# Patient Record
Sex: FEMALE | Race: WHITE | NOT HISPANIC OR LATINO | Employment: OTHER | URBAN - NONMETROPOLITAN AREA
[De-identification: names, ages, dates, MRNs, and addresses within clinical notes are randomized per-mention and may not be internally consistent; named-entity substitution may affect disease eponyms.]

---

## 2019-10-23 ENCOUNTER — ALLIED HEALTH/NURSE VISIT (OUTPATIENT)
Dept: FAMILY MEDICINE | Facility: OTHER | Age: 29
End: 2019-10-23
Payer: COMMERCIAL

## 2019-10-23 VITALS — SYSTOLIC BLOOD PRESSURE: 110 MMHG | DIASTOLIC BLOOD PRESSURE: 68 MMHG | HEART RATE: 76 BPM | WEIGHT: 140.2 LBS

## 2019-10-23 DIAGNOSIS — N91.2 ABSENCE OF MENSTRUATION: Primary | ICD-10-CM

## 2019-10-23 LAB — HCG UR QL: POSITIVE

## 2019-10-23 PROCEDURE — 81025 URINE PREGNANCY TEST: CPT | Performed by: FAMILY MEDICINE

## 2019-10-23 PROCEDURE — 99207 ZZC NO CHARGE NURSE ONLY: CPT

## 2019-10-23 NOTE — PROGRESS NOTES
Brittainy N Brachtl is a 29 year old here today for a pregnancy test.  LMP: Patient's last menstrual period was 09/10/2019 (exact date).  Wt: 140 lbs 3.2 oz.    Symptoms include weight gain, absence of menses, nausea &/or vomiting and fatigue.    Omar informed of positive pregnancy test results. JUANA: 2020    Educational advice given: nutrition, smoking and drugs & alcohol.    Current medications reviewed: Yes    Previous pregnancy history remarkable for: diabetes (first 2 pregnancies had gestational diabetes. Last pregnancy was normal). Has 3 children (11 year old, 7 year old, 2 year old)    Plan: schedule appointment with OB Educator and/or OB class, follow-up appointment with Dr. Casillas (wants female provider) for pre-larry care, take multivitamin or pre-larry vitamins and OB Education packet given.    She is to call back if she has any questions or concerns.  She is advised to notify a provider immediately if she experiences any severe cramping or abdominal pain or any vaginal bleeding.    Lena Crump, DASHAN, RN  [facility]

## 2019-11-28 ENCOUNTER — APPOINTMENT (OUTPATIENT)
Dept: ULTRASOUND IMAGING | Facility: CLINIC | Age: 29
End: 2019-11-28
Attending: NURSE PRACTITIONER
Payer: COMMERCIAL

## 2019-11-28 ENCOUNTER — HOSPITAL ENCOUNTER (EMERGENCY)
Facility: CLINIC | Age: 29
Discharge: HOME | End: 2019-11-28
Attending: NURSE PRACTITIONER | Admitting: NURSE PRACTITIONER
Payer: COMMERCIAL

## 2019-11-28 VITALS
SYSTOLIC BLOOD PRESSURE: 137 MMHG | DIASTOLIC BLOOD PRESSURE: 86 MMHG | OXYGEN SATURATION: 100 % | RESPIRATION RATE: 18 BRPM | TEMPERATURE: 97.8 F

## 2019-11-28 DIAGNOSIS — O20.9 BLEEDING IN EARLY PREGNANCY: ICD-10-CM

## 2019-11-28 LAB
B-HCG SERPL-ACNC: ABNORMAL IU/L (ref 0–5)
BLOOD BANK CMNT PATIENT-IMP: NORMAL

## 2019-11-28 PROCEDURE — 99284 EMERGENCY DEPT VISIT MOD MDM: CPT | Performed by: NURSE PRACTITIONER

## 2019-11-28 PROCEDURE — 25000128 H RX IP 250 OP 636: Performed by: NURSE PRACTITIONER

## 2019-11-28 PROCEDURE — 85461 HEMOGLOBIN FETAL: CPT | Performed by: NURSE PRACTITIONER

## 2019-11-28 PROCEDURE — 96372 THER/PROPH/DIAG INJ SC/IM: CPT | Performed by: NURSE PRACTITIONER

## 2019-11-28 PROCEDURE — 84702 CHORIONIC GONADOTROPIN TEST: CPT | Performed by: NURSE PRACTITIONER

## 2019-11-28 PROCEDURE — 25000132 ZZH RX MED GY IP 250 OP 250 PS 637: Performed by: NURSE PRACTITIONER

## 2019-11-28 PROCEDURE — 76801 OB US < 14 WKS SINGLE FETUS: CPT

## 2019-11-28 PROCEDURE — 86850 RBC ANTIBODY SCREEN: CPT | Performed by: NURSE PRACTITIONER

## 2019-11-28 PROCEDURE — 86901 BLOOD TYPING SEROLOGIC RH(D): CPT | Performed by: NURSE PRACTITIONER

## 2019-11-28 PROCEDURE — 86900 BLOOD TYPING SEROLOGIC ABO: CPT | Performed by: NURSE PRACTITIONER

## 2019-11-28 PROCEDURE — 99284 EMERGENCY DEPT VISIT MOD MDM: CPT | Mod: 25 | Performed by: NURSE PRACTITIONER

## 2019-11-28 RX ORDER — ACETAMINOPHEN 325 MG/1
650 TABLET ORAL ONCE
Status: COMPLETED | OUTPATIENT
Start: 2019-11-28 | End: 2019-11-28

## 2019-11-28 RX ADMIN — HUMAN RHO(D) IMMUNE GLOBULIN 300 MCG: 300 INJECTION, SOLUTION INTRAMUSCULAR at 20:07

## 2019-11-28 RX ADMIN — ACETAMINOPHEN 650 MG: 325 TABLET, FILM COATED ORAL at 20:06

## 2019-11-28 NOTE — ED AVS SNAPSHOT
[facility] Emergency Department  [address] [place] [address]  Phone:  [phone]  Fax:  [phone]                                    Brittainy N Brachtl   MRN: [id number]    Department:  [facility] Emergency Department   Date of Visit:  11/28/2019           After Visit Summary Signature Page    I have received my discharge instructions, and my questions have been answered. I have discussed any challenges I see with this plan with the nurse or doctor.    ..........................................................................................................................................  Patient/Patient Representative Signature      ..........................................................................................................................................  Patient Representative Print Name and Relationship to Patient    ..................................................               ................................................  Date                                   Time    ..........................................................................................................................................  Reviewed by Signature/Title    ...................................................              ..............................................  Date                                               Time          22EPIC Rev 08/18

## 2019-11-29 LAB
ABO + RH BLD: NORMAL
ABO + RH BLD: NORMAL
BLD GP AB SCN SERPL QL: NORMAL
BLOOD BANK CMNT PATIENT-IMP: NORMAL
DATE RH IMM GL GVN: NORMAL
FETAL CELL SCN BLD QL ROSETTE: NORMAL
RH IG VIALS RECOM PATIENT: NORMAL

## 2019-11-29 NOTE — DISCHARGE INSTRUCTIONS
Glad Baby Looks Good.  Please follow up with your primary OB/GYN in the next week.  Return for any new or worsening symptoms  Happy Thanksgiving

## 2019-11-29 NOTE — ED NOTES
PT is PG 12 wks- w/o any previous complications.  Today she has noted dark brown spotting. No cramping.  Pt has never had any problems like this b/4, so to ED for eval.  Pt's next OB appt is Thursday this wk.

## 2019-12-09 ENCOUNTER — PRENATAL OFFICE VISIT (OUTPATIENT)
Dept: OBGYN | Facility: CLINIC | Age: 29
End: 2019-12-09
Payer: COMMERCIAL

## 2019-12-09 VITALS
HEIGHT: 65 IN | SYSTOLIC BLOOD PRESSURE: 122 MMHG | BODY MASS INDEX: 24.49 KG/M2 | DIASTOLIC BLOOD PRESSURE: 74 MMHG | HEART RATE: 72 BPM | WEIGHT: 147 LBS

## 2019-12-09 DIAGNOSIS — Z11.3 SCREEN FOR STD (SEXUALLY TRANSMITTED DISEASE): ICD-10-CM

## 2019-12-09 DIAGNOSIS — Z12.4 SCREENING FOR MALIGNANT NEOPLASM OF CERVIX: ICD-10-CM

## 2019-12-09 DIAGNOSIS — Z34.92 NORMAL PREGNANCY IN SECOND TRIMESTER: Primary | ICD-10-CM

## 2019-12-09 LAB
ALBUMIN UR-MCNC: NEGATIVE MG/DL
APPEARANCE UR: CLEAR
BASOPHILS # BLD AUTO: 0 10E9/L (ref 0–0.2)
BASOPHILS NFR BLD AUTO: 0.4 %
BILIRUB UR QL STRIP: NEGATIVE
COLOR UR AUTO: NORMAL
DIFFERENTIAL METHOD BLD: NORMAL
EOSINOPHIL NFR BLD AUTO: 2.2 %
ERYTHROCYTE [DISTWIDTH] IN BLOOD BY AUTOMATED COUNT: 13.2 % (ref 10–15)
GLUCOSE UR STRIP-MCNC: NEGATIVE MG/DL
HBA1C MFR BLD: 4.7 % (ref 0–5.6)
HCT VFR BLD AUTO: 40.1 % (ref 35–47)
HGB BLD-MCNC: 13.5 G/DL (ref 11.7–15.7)
HGB UR QL STRIP: NEGATIVE
IMM GRANULOCYTES # BLD: 0 10E9/L (ref 0–0.4)
IMM GRANULOCYTES NFR BLD: 0.5 %
KETONES UR STRIP-MCNC: NEGATIVE MG/DL
LEUKOCYTE ESTERASE UR QL STRIP: NEGATIVE
LYMPHOCYTES # BLD AUTO: 2.5 10E9/L (ref 0.8–5.3)
LYMPHOCYTES NFR BLD AUTO: 30 %
MCH RBC QN AUTO: 30.3 PG (ref 26.5–33)
MCHC RBC AUTO-ENTMCNC: 33.7 G/DL (ref 31.5–36.5)
MCV RBC AUTO: 90 FL (ref 78–100)
MONOCYTES # BLD AUTO: 0.5 10E9/L (ref 0–1.3)
MONOCYTES NFR BLD AUTO: 6.6 %
NEUTROPHILS # BLD AUTO: 5 10E9/L (ref 1.6–8.3)
NEUTROPHILS NFR BLD AUTO: 60.3 %
NITRATE UR QL: NEGATIVE
NRBC # BLD AUTO: 0 10*3/UL
NRBC BLD AUTO-RTO: 0 /100
PH UR STRIP: 6 PH (ref 5–7)
PLATELET # BLD AUTO: 220 10E9/L (ref 150–450)
RBC # BLD AUTO: 4.46 10E12/L (ref 3.8–5.2)
SOURCE: NORMAL
SP GR UR STRIP: 1 (ref 1–1.03)
UROBILINOGEN UR STRIP-MCNC: 0 MG/DL (ref 0–2)
WBC # BLD AUTO: 8.2 10E9/L (ref 4–11)

## 2019-12-09 PROCEDURE — 86762 RUBELLA ANTIBODY: CPT | Performed by: OBSTETRICS & GYNECOLOGY

## 2019-12-09 PROCEDURE — 83036 HEMOGLOBIN GLYCOSYLATED A1C: CPT | Performed by: OBSTETRICS & GYNECOLOGY

## 2019-12-09 PROCEDURE — 85025 COMPLETE CBC W/AUTO DIFF WBC: CPT | Performed by: OBSTETRICS & GYNECOLOGY

## 2019-12-09 PROCEDURE — 87491 CHLMYD TRACH DNA AMP PROBE: CPT | Performed by: OBSTETRICS & GYNECOLOGY

## 2019-12-09 PROCEDURE — G0145 SCR C/V CYTO,THINLAYER,RESCR: HCPCS | Performed by: OBSTETRICS & GYNECOLOGY

## 2019-12-09 PROCEDURE — 87086 URINE CULTURE/COLONY COUNT: CPT | Performed by: OBSTETRICS & GYNECOLOGY

## 2019-12-09 PROCEDURE — 87591 N.GONORRHOEAE DNA AMP PROB: CPT | Performed by: OBSTETRICS & GYNECOLOGY

## 2019-12-09 PROCEDURE — 36415 COLL VENOUS BLD VENIPUNCTURE: CPT | Performed by: OBSTETRICS & GYNECOLOGY

## 2019-12-09 PROCEDURE — 87389 HIV-1 AG W/HIV-1&-2 AB AG IA: CPT | Performed by: OBSTETRICS & GYNECOLOGY

## 2019-12-09 PROCEDURE — G0499 HEPB SCREEN HIGH RISK INDIV: HCPCS | Performed by: OBSTETRICS & GYNECOLOGY

## 2019-12-09 PROCEDURE — 81003 URINALYSIS AUTO W/O SCOPE: CPT | Performed by: OBSTETRICS & GYNECOLOGY

## 2019-12-09 PROCEDURE — 99207 ZZC FIRST OB VISIT: CPT | Performed by: OBSTETRICS & GYNECOLOGY

## 2019-12-09 ASSESSMENT — MIFFLIN-ST. JEOR: SCORE: 1392.67

## 2019-12-09 NOTE — PROGRESS NOTES
New OB Visit    Brittainy N Brachtl is a 29 year old  at 12w6d by LMP c/w 12w2d US who presents today for a new OB exam. This was a planned and she desires pregnancy. She is transferring care from [facility]. Since her LMP, has experienced  vaginal bleeding and was seen in the ER in early pregnancy. Bleeding has since stopped. The patient denies cramping or abdominal pain, or vaginal discharge. She denies nausea, emesis, abdominal pain, fatigue, headache, loss of appetite, vaginal discharge, pelvic pain, urinary urgency, lightheadedness, hemorrhoids and constipation.    Pregnancy complicated by   -Bleeding first trimester, given Rhogam in ER (). No subchorionic hem on ultrasound  -Asthma, last inhaler used 6 months ago when sick  -Carrier for CF, FOB not a carrier      Have you travelled during the pregnancy?No  Have your sexual partner(s) travelled during the pregnancy?No    Ob Hx:  s/p SVDx3. Hx GDM w. G1/G2.    Past Medical History of Father of Baby: No significant medical history    Gyn Hx: Patient's last menstrual period was 09/10/2019 (exact date).     Last pap was 2015, No history of abnormal paps.    STI history denies      Family history genetic disorders, cystic fibrosis, SMA, intellectual disability or autism- Daughter carrier for CF      There is no immunization history on file for this patient.     PMH:   Past Medical History:   Diagnosis Date     Asthma        SurgHx:   No past surgical history on file.    FamHx:   History reviewed. No pertinent family history.    SocHx:   Social History     Socioeconomic History     Marital status:      Spouse name: Not on file     Number of children: Not on file     Years of education: Not on file     Highest education level: Not on file   Occupational History     Not on file   Social Needs     Financial resource strain: Not on file     Food insecurity:     Worry: Not on file     Inability: Not on file     Transportation needs:     Medical:  "Not on file     Non-medical: Not on file   Tobacco Use     Smoking status: Light Tobacco Smoker     Smokeless tobacco: Never Used   Substance and Sexual Activity     Alcohol use: Not Currently     Drug use: Never     Sexual activity: Yes     Partners: Male   Lifestyle     Physical activity:     Days per week: Not on file     Minutes per session: Not on file     Stress: Not on file   Relationships     Social connections:     Talks on phone: Not on file     Gets together: Not on file     Attends Yazdanism service: Not on file     Active member of club or organization: Not on file     Attends meetings of clubs or organizations: Not on file     Relationship status: Not on file     Intimate partner violence:     Fear of current or ex partner: Not on file     Emotionally abused: Not on file     Physically abused: Not on file     Forced sexual activity: Not on file   Other Topics Concern     Not on file   Social History Narrative     Not on file       Allergies:   Patient has no known allergies.    Medications:   Prenatal MV-Min-Fe Fum-FA-DHA (PRENATAL 1 PO),     No current facility-administered medications on file prior to visit.       Past medical, surgical, social and family history were reviewed and updated in Epic.      ROS: As described in HPI, otherwise negative for fever/chills, fatigue, dizziness, changes or new deficits in vision, worrisome rashes, new lumps or masses, cough/SOB/CP, GI distress, dysuria, abnormal vaginal discharge, constipation/diarrhea, neurological deficits, or other systemic complaints    EXAM:  Vitals: /74 (BP Location: Right arm, Cuff Size: Adult Regular)   Pulse 72   Ht 1.651 m (5' 5\")   Wt 66.7 kg (147 lb)   LMP 09/10/2019 (Exact Date)   BMI 24.46 kg/m    BMI= Body mass index is 24.46 kg/m .    FHTs: 145bpm    Gen: Alert, oriented, appropriately interactive, NAD  Neck: soft, no cervical adenopathy, no masses  CV: RRR, no murmurs, no extra heart sounds, 2+ peripheral " pulses  Resp: CTAB, good effort without distress   Breasts: no axillary adenopathy, no dominant masses, no skin changes, no nipple discharge, nontender  Abdomen: soft, gravid, non tender, non distended, no masses, no hernias. No inguinal lymphadenopathy  External genitalia: no lesions; normal appearing external genitalia, bartholins glands, urethra, skenes glands  Vagina: no masses or lesions or discharge, normally rugated.  Cervix: no masses or lesions or discharge   Bimanual exam:   Urethra nontender   Bladder: nontender and without massess, well supported   Uterus: midline , anteverted, mobile , no masses, non-tender, gravid at 12weeks  Adnexa: no masses or tenderness   No cervical motion tenderness  Lower extremities: non-tender, no edema  Skin: no lesions or rashes    Pap obtained Yes    Labs & Imaging:  Recent Labs   Lab Test 11/28/19  1841   ABO A   RH Neg   AS Neg       No lab results found.      Results for orders placed or performed during the hospital encounter of 11/28/19   US OB < 14 Weeks Single    Narrative    ULTRASOUND OBSTETRIC LESS THAN FOURTEEN WEEKS, SINGLE  11/28/2019 7:17  PM    HISTORY: Spotting-brown.    TECHNIQUE: Transabdominal imaging was performed.      COMPARISON:  None.    FINDINGS:    Estimated gestational age by current ultrasound measurement: 12 weeks  2 days.  Estimated date of delivery based on this ultrasound: 6/9/2020.  Patient reported LMP: 9/10/2019.  Estimated gestational age by reported LMP: 11 weeks 2 days.    Crown-rump length: 5.6 cm.   Embryonic cardiac activity: 160 bpm.   Yolk sac: Not seen.  Subchorionic hemorrhage: None.    Gestational sac shape: Grossly within normal limits.  Amniotic fluid volume: Qualitatively within normal limits.    Right ovary: Not visualized and cannot be evaluated.  Left ovary: Unremarkable.  Adnexal mass: None.  Free pelvic fluid: None.      Impression    IMPRESSION: Single, live, intrauterine gestation of 12 weeks 2 days  gestational age.  This is approximately one week advanced as compared  to the previously determined gestational age. I recommend correlation  with the prior outside ultrasound, if available. No subchorionic  hemorrhage is identified on today's study. Right ovary is not seen and  cannot be evaluated.    KEE SOLIS MD           ASSESSMENT/PLAN: Brittainy N Brachtl is a 29 year old  at 12w6d by LMP c/w 12w2d US who presents today for her first ob visit      ICD-10-CM    1. Normal pregnancy in second trimester Z34.92 CBC with platelets differential     Rubella Antibody IgG Quantitative     Urine Culture Aerobic Bacterial     *UA reflex to Microscopic     Hepatitis B surface antigen     Hemoglobin A1c   2. Screen for STD (sexually transmitted disease) Z11.3 Chlamydia trachomatis PCR     Neisseria gonorrhoeae PCR   3. Screening for malignant neoplasm of cervix Z12.4 Pap imaged thin layer screen reflex to HPV if ASCUS - recommended age 25 - 29 years     HIV Antigen Antibody Combo       Discussed genetic screening options, including sequential and quad testing/AFP, cell-free DNA as well as diagnostic testing including amniocentesis. Patient would like to defer  Discussed recommendation for Cystic Fibrosis and SMA carrier screening, the patient has already by tested and is a carrier for CF.  Discussed ordered labs,   all questions answered.  Early GCT not needed  Discussed benefits of breastfeeding  Folder given, outlining p hysician coverage, exercise, weight gain, schedule of visits, routine and indicated ultrasounds, prenatal vitamins and childbirth education.    Flu Vaccine Declines  Body mass index is 24.46 kg/m . - recommend 25-35 lbs (BMI 18.5-24.9)   Return in 4 weeks for routine OB care          Tawana Pardo DO  2019 3:09 PM

## 2019-12-10 LAB
BACTERIA SPEC CULT: NO GROWTH
C TRACH DNA SPEC QL NAA+PROBE: NEGATIVE
HBV SURFACE AG SERPL QL IA: NONREACTIVE
HIV 1+2 AB+HIV1 P24 AG SERPL QL IA: NONREACTIVE
Lab: NORMAL
N GONORRHOEA DNA SPEC QL NAA+PROBE: NEGATIVE
RUBV IGG SERPL IA-ACNC: 13 IU/ML
SPECIMEN SOURCE: NORMAL

## 2019-12-12 ENCOUNTER — MYC MEDICAL ADVICE (OUTPATIENT)
Dept: OBGYN | Facility: CLINIC | Age: 29
End: 2019-12-12

## 2019-12-12 LAB
COPATH REPORT: NORMAL
PAP: NORMAL

## 2019-12-13 ENCOUNTER — MYC MEDICAL ADVICE (OUTPATIENT)
Dept: OBGYN | Facility: CLINIC | Age: 29
End: 2019-12-13

## 2020-01-13 ENCOUNTER — PRENATAL OFFICE VISIT (OUTPATIENT)
Dept: OBGYN | Facility: CLINIC | Age: 30
End: 2020-01-13
Payer: COMMERCIAL

## 2020-01-13 VITALS
BODY MASS INDEX: 23.64 KG/M2 | WEIGHT: 150.6 LBS | SYSTOLIC BLOOD PRESSURE: 112 MMHG | HEART RATE: 87 BPM | HEIGHT: 67 IN | DIASTOLIC BLOOD PRESSURE: 68 MMHG

## 2020-01-13 DIAGNOSIS — Z34.92 NORMAL PREGNANCY IN SECOND TRIMESTER: Primary | ICD-10-CM

## 2020-01-13 PROCEDURE — 99207 ZZC PRENATAL VISIT: CPT | Performed by: OBSTETRICS & GYNECOLOGY

## 2020-01-13 ASSESSMENT — MIFFLIN-ST. JEOR: SCORE: 1440.75

## 2020-01-13 NOTE — PROGRESS NOTES
"17w6d presents for routine  appointment.     No complaints.    No LOF/VB/Ctxs.  Possible fluttering  ROS:   and GI negative.     /68   Pulse 87   Ht 1.702 m (5' 7\")   Wt 68.3 kg (150 lb 9.6 oz)   LMP 09/10/2019 (Exact Date)   BMI 23.59 kg/m    FHT: 140bpm  FH: S=D    Please see Prenatal Vitals and Notes Flowsheet for further objective data.    A/P:  29 year old  at 17w6d       ICD-10-CM    1. Normal pregnancy in second trimester Z34.92 US OB > 14 Weeks       Pregnancy complicated by   -Bleeding first trimester, s/pRhogam in ER (). No subchorionic hem on ultrasound. No further bleeding  -Asthma, last inhaler used 6 months ago when sick  -Carrier for CF, FOB not a carrier     Routine prenatal care:  -Genetic screening- declined  -20 week ultrasound ordered for 18-22wks  -Discussed appropriate weight gain, diet, and exercise     Follow up in 4 week(s).    Tawana Pardo, DO      "

## 2020-01-20 ENCOUNTER — HOSPITAL ENCOUNTER (OUTPATIENT)
Dept: ULTRASOUND IMAGING | Facility: CLINIC | Age: 30
Discharge: HOME | End: 2020-01-20
Attending: OBSTETRICS & GYNECOLOGY | Admitting: OBSTETRICS & GYNECOLOGY
Payer: COMMERCIAL

## 2020-01-20 DIAGNOSIS — Z34.92 NORMAL PREGNANCY IN SECOND TRIMESTER: ICD-10-CM

## 2020-01-20 PROCEDURE — 76805 OB US >/= 14 WKS SNGL FETUS: CPT

## 2020-01-28 ENCOUNTER — TELEPHONE (OUTPATIENT)
Facility: CLINIC | Age: 30
End: 2020-01-28

## 2020-01-28 NOTE — TELEPHONE ENCOUNTER
Per 1/20/20 US result note:  Notes recorded by Tawana Pardo DO on 1/28/2020 at 11:56 AM CST  Please call the patient with the results. Unremarkable anatomy ultrasound other than low-lying placenta, and possible placental lake. No interventions required, other than follow-up ultrasound in 4 weeks. Will order at next appointment.  Tawana Pardo DO    Pt is wanting to know if she needs to be seen sooner than her 2/10 prenatal exam that she has scheduled.  I advised that she does not need to be seen sooner.  Dr. Pardo wants her to f/u with another US in 4 weeks but she can keep her regular prenatal appt and she can discuss her US at that time and order it.    Pt verbalized understanding and agreed to plan.    Jenna Victor RN

## 2020-01-28 NOTE — TELEPHONE ENCOUNTER
Reason for Call:  Other call back    Detailed comments: Patient asked to speak with nurse regarding ultrasound results and maybe needing to be seen sooner.  Please call    Phone Number Patient can be reached at: Home number on file [phone] (home)    Best Time: any    Can we leave a detailed message on this number? YES    Call taken on 1/28/2020 at 1:00 PM by Jennifer Berrios

## 2020-02-10 ENCOUNTER — PRENATAL OFFICE VISIT (OUTPATIENT)
Dept: OBGYN | Facility: CLINIC | Age: 30
End: 2020-02-10
Payer: COMMERCIAL

## 2020-02-10 VITALS
WEIGHT: 157.9 LBS | HEART RATE: 89 BPM | BODY MASS INDEX: 24.73 KG/M2 | SYSTOLIC BLOOD PRESSURE: 128 MMHG | DIASTOLIC BLOOD PRESSURE: 66 MMHG

## 2020-02-10 DIAGNOSIS — O44.40 LOW-LYING PLACENTA: ICD-10-CM

## 2020-02-10 DIAGNOSIS — Z34.92 NORMAL PREGNANCY IN SECOND TRIMESTER: Primary | ICD-10-CM

## 2020-02-10 PROCEDURE — 99207 ZZC PRENATAL VISIT: CPT | Performed by: OBSTETRICS & GYNECOLOGY

## 2020-02-10 NOTE — PROGRESS NOTES
Presents for routine  appointment.     No complaints.    No LOF/VB/Ctxs.  +FM  ROS:   and GI negative.     /66   Pulse 89   Wt 71.6 kg (157 lb 14.4 oz)   LMP 09/10/2019 (Exact Date)   BMI 24.73 kg/m    FHT: 145bpm  FH: 21cm    Please see Prenatal Vitals and Notes Flowsheet for further objective data.    A/P:  29 year old  at 21w6d ADDIE      ICD-10-CM    1. Normal pregnancy in second trimester Z34.92    2. Low-lying placenta O44.40 US OB >14 Weeks Follow Up       Pregnancy complicated by   -Bleeding first trimester, s/p Rhogam in ER (). No subchorionic hem on ultrasound. No further bleeding. Repeat rhogam at 28wks  -Asthma, last inhaler used 6 months ago when sick  -Carrier for CF, FOB not a carrier  -Low lying placenta w/ poss placental lake. Repeat ultrasound ordered    Routine prenatal care:  -Discussed appropriate weight gain, diet, and exercise   -GCT, hgb and anti-treponema testing  after 24 weeks     Follow up in 4 week(s).    Tawana Pardo DO

## 2020-02-13 ENCOUNTER — HOSPITAL ENCOUNTER (OUTPATIENT)
Dept: ULTRASOUND IMAGING | Facility: CLINIC | Age: 30
Discharge: HOME | End: 2020-02-13
Attending: OBSTETRICS & GYNECOLOGY | Admitting: OBSTETRICS & GYNECOLOGY
Payer: COMMERCIAL

## 2020-02-13 DIAGNOSIS — O44.40 LOW-LYING PLACENTA: ICD-10-CM

## 2020-02-13 PROCEDURE — 76816 OB US FOLLOW-UP PER FETUS: CPT

## 2020-03-09 ENCOUNTER — PRENATAL OFFICE VISIT (OUTPATIENT)
Dept: OBGYN | Facility: CLINIC | Age: 30
End: 2020-03-09
Payer: COMMERCIAL

## 2020-03-09 VITALS
HEART RATE: 95 BPM | BODY MASS INDEX: 26.14 KG/M2 | WEIGHT: 166.9 LBS | SYSTOLIC BLOOD PRESSURE: 110 MMHG | DIASTOLIC BLOOD PRESSURE: 60 MMHG

## 2020-03-09 DIAGNOSIS — Z34.92 NORMAL PREGNANCY IN SECOND TRIMESTER: Primary | ICD-10-CM

## 2020-03-09 DIAGNOSIS — G43.909 MIGRAINE WITHOUT STATUS MIGRAINOSUS, NOT INTRACTABLE, UNSPECIFIED MIGRAINE TYPE: ICD-10-CM

## 2020-03-09 PROCEDURE — 99207 ZZC PRENATAL VISIT: CPT | Performed by: OBSTETRICS & GYNECOLOGY

## 2020-03-09 RX ORDER — BUTALBITAL, ACETAMINOPHEN AND CAFFEINE 50; 325; 40 MG/1; MG/1; MG/1
1 TABLET ORAL EVERY 4 HOURS PRN
Qty: 30 TABLET | Refills: 0 | Status: ON HOLD | OUTPATIENT
Start: 2020-03-09 | End: 2020-06-08

## 2020-03-09 NOTE — PROGRESS NOTES
Presents for routine  appointment.     No complaints.    No LOF/VB/Ctxs.  +FM  ROS:   and GI negative.     /60   Pulse 95   Wt 75.7 kg (166 lb 14.4 oz)   LMP 09/10/2019 (Exact Date)   BMI 26.14 kg/m    FHT: 145bpm  FH: 25cm    Please see Prenatal Vitals and Notes Flowsheet for further objective data.    A/P:  29 year old  at 25w6d ADDIE      ICD-10-CM    1. Normal pregnancy in second trimester  Z34.92 butalbital-acetaminophen-caffeine (ESGIC) -40 MG tablet     Antibody screen red cell     CBC with platelets     Glucose tolerance gest screen 1 hour     Treponema Abs w Reflex to RPR and Titer     CANCELED: CBC with platelets     CANCELED: Glucose tolerance gest screen 1 hour     CANCELED: Treponema Abs w Reflex to RPR and Titer     CANCELED: Antibody screen red cell   2. Migraine without status migrainosus, not intractable, unspecified migraine type  G43.909 butalbital-acetaminophen-caffeine (ESGIC) -40 MG tablet       Pregnancy complicated by   -Bleeding first trimester, s/p Rhogam in ER (). No subchorionic hem on ultrasound. No further bleeding. Repeat rhogam at 28wks  -Asthma, last inhaler used 6 months ago when sick  -Carrier for CF, FOB not a carrier  -Low lying placenta w/ poss placental lake. Resolved, lakes stable  -Migraines without aura, no longer responding to tylenol. Previously on Imitrex. fiorcet prescribed today.    Routine prenatal care:  -Discussed appropriate weight gain, diet, and exercise   -GCT, hgb and anti-treponema testing  after 24 weeks     Follow up in 4 week(s).    Tawana Pardo DO

## 2020-03-11 ENCOUNTER — HEALTH MAINTENANCE LETTER (OUTPATIENT)
Age: 30
End: 2020-03-11

## 2020-03-20 ENCOUNTER — TELEPHONE (OUTPATIENT)
Dept: FAMILY MEDICINE | Facility: OTHER | Age: 30
End: 2020-03-20

## 2020-03-20 NOTE — TELEPHONE ENCOUNTER
I called patient  and told her plan to do 1 hr gtt on 3/30 with appointment I scheduled her for lab appt. To start before appt.   She states understanding.  ELY Prasad 3/20/2020

## 2020-03-20 NOTE — TELEPHONE ENCOUNTER
Reason for Call:  Other 1 hr glucose    Detailed comments: pt is wondering if she should come in for her 1 hr glucose? Please call    Phone Number Patient can be reached at: Home number on file [phone] (home)    Best Time:     Can we leave a detailed message on this number? YES    Call taken on 3/20/2020 at 8:31 AM by Dorene Zaidi

## 2020-03-30 ENCOUNTER — PRENATAL OFFICE VISIT (OUTPATIENT)
Dept: OBGYN | Facility: CLINIC | Age: 30
End: 2020-03-30
Payer: COMMERCIAL

## 2020-03-30 VITALS
TEMPERATURE: 97.6 F | BODY MASS INDEX: 26.66 KG/M2 | WEIGHT: 170.2 LBS | SYSTOLIC BLOOD PRESSURE: 114 MMHG | HEART RATE: 99 BPM | DIASTOLIC BLOOD PRESSURE: 60 MMHG

## 2020-03-30 DIAGNOSIS — Z98.890 HISTORY OF ROOT CANAL PROCEDURE: ICD-10-CM

## 2020-03-30 DIAGNOSIS — Z67.91 RH NEGATIVE STATE IN ANTEPARTUM PERIOD: ICD-10-CM

## 2020-03-30 DIAGNOSIS — Z34.92 NORMAL PREGNANCY IN SECOND TRIMESTER: ICD-10-CM

## 2020-03-30 DIAGNOSIS — O26.899 RH NEGATIVE STATE IN ANTEPARTUM PERIOD: ICD-10-CM

## 2020-03-30 DIAGNOSIS — Z23 NEED FOR TDAP VACCINATION: ICD-10-CM

## 2020-03-30 DIAGNOSIS — Z34.92 NORMAL PREGNANCY IN SECOND TRIMESTER: Primary | ICD-10-CM

## 2020-03-30 LAB
ABO + RH BLD: NORMAL
ABO + RH BLD: NORMAL
BLD GP AB SCN SERPL QL: NORMAL
BLOOD BANK CMNT PATIENT-IMP: NORMAL
ERYTHROCYTE [DISTWIDTH] IN BLOOD BY AUTOMATED COUNT: 13.9 % (ref 10–15)
GLUCOSE 1H P 50 G GLC PO SERPL-MCNC: 126 MG/DL (ref 60–129)
HCT VFR BLD AUTO: 36.8 % (ref 35–47)
HGB BLD-MCNC: 11.6 G/DL (ref 11.7–15.7)
MCH RBC QN AUTO: 30.2 PG (ref 26.5–33)
MCHC RBC AUTO-ENTMCNC: 31.5 G/DL (ref 31.5–36.5)
MCV RBC AUTO: 96 FL (ref 78–100)
PLATELET # BLD AUTO: 239 10E9/L (ref 150–450)
RBC # BLD AUTO: 3.84 10E12/L (ref 3.8–5.2)
SPECIMEN EXP DATE BLD: NORMAL
T PALLIDUM AB SER QL: NONREACTIVE
WBC # BLD AUTO: 17.1 10E9/L (ref 4–11)

## 2020-03-30 PROCEDURE — 86780 TREPONEMA PALLIDUM: CPT | Performed by: OBSTETRICS & GYNECOLOGY

## 2020-03-30 PROCEDURE — 90715 TDAP VACCINE 7 YRS/> IM: CPT | Performed by: OBSTETRICS & GYNECOLOGY

## 2020-03-30 PROCEDURE — 86900 BLOOD TYPING SEROLOGIC ABO: CPT | Performed by: OBSTETRICS & GYNECOLOGY

## 2020-03-30 PROCEDURE — 96372 THER/PROPH/DIAG INJ SC/IM: CPT | Performed by: OBSTETRICS & GYNECOLOGY

## 2020-03-30 PROCEDURE — 90471 IMMUNIZATION ADMIN: CPT | Performed by: OBSTETRICS & GYNECOLOGY

## 2020-03-30 PROCEDURE — 36415 COLL VENOUS BLD VENIPUNCTURE: CPT | Performed by: OBSTETRICS & GYNECOLOGY

## 2020-03-30 PROCEDURE — 86850 RBC ANTIBODY SCREEN: CPT | Performed by: OBSTETRICS & GYNECOLOGY

## 2020-03-30 PROCEDURE — 82950 GLUCOSE TEST: CPT | Performed by: OBSTETRICS & GYNECOLOGY

## 2020-03-30 PROCEDURE — 99207 ZZC PRENATAL VISIT: CPT | Performed by: OBSTETRICS & GYNECOLOGY

## 2020-03-30 PROCEDURE — 85027 COMPLETE CBC AUTOMATED: CPT | Performed by: OBSTETRICS & GYNECOLOGY

## 2020-03-30 PROCEDURE — 86901 BLOOD TYPING SEROLOGIC RH(D): CPT | Performed by: OBSTETRICS & GYNECOLOGY

## 2020-03-30 RX ORDER — AMOXICILLIN 500 MG/1
CAPSULE ORAL
COMMUNITY
Start: 2020-03-26 | End: 2020-04-13

## 2020-03-30 RX ORDER — HYDROCODONE BITARTRATE AND ACETAMINOPHEN 5; 325 MG/1; MG/1
1 TABLET ORAL EVERY 6 HOURS PRN
Qty: 10 TABLET | Refills: 0 | Status: ON HOLD | OUTPATIENT
Start: 2020-03-30 | End: 2020-06-08

## 2020-03-30 RX ORDER — FERROUS GLUCONATE 324(38)MG
324 TABLET ORAL EVERY OTHER DAY
Qty: 30 TABLET | Refills: 3 | Status: ON HOLD | OUTPATIENT
Start: 2020-03-30 | End: 2020-06-08

## 2020-03-30 NOTE — NURSING NOTE
"Chief Complaint   Patient presents with     Prenatal Care       Initial /60 (BP Location: Right arm, Patient Position: Chair, Cuff Size: Adult Regular)   Pulse 99   Temp 97.6  F (36.4  C) (Temporal)   Wt 77.2 kg (170 lb 3.2 oz)   LMP 09/10/2019 (Exact Date)   BMI 26.66 kg/m   Estimated body mass index is 26.66 kg/m  as calculated from the following:    Height as of 20: 1.702 m (5' 7\").    Weight as of this encounter: 77.2 kg (170 lb 3.2 oz).  BP completed using cuff size: regular        Irish Gonzáles, SUE  2020        "

## 2020-03-30 NOTE — NURSING NOTE
Prior to immunization administration, verified patients identity using patient s name and date of birth. Please see Immunization Activity for additional information.     Screening Questionnaire for Adult Immunization    Are you sick today?   No   Do you have allergies to medications, food, a vaccine component or latex?   No   Have you ever had a serious reaction after receiving a vaccination?   No   Do you have a long-term health problem with heart, lung, kidney, or metabolic disease (e.g., diabetes), asthma, a blood disorder, no spleen, complement component deficiency, a cochlear implant, or a spinal fluid leak?  Are you on long-term aspirin therapy?   Yes   Do you have cancer, leukemia, HIV/AIDS, or any other immune system problem?   No   Do you have a parent, brother, or sister with an immune system problem?   No   In the past 3 months, have you taken medications that affect  your immune system, such as prednisone, other steroids, or anticancer drugs; drugs for the treatment of rheumatoid arthritis, Crohn s disease, or psoriasis; or have you had radiation treatments?   No   Have you had a seizure, or a brain or other nervous system problem?   No   During the past year, have you received a transfusion of blood or blood    products, or been given immune (gamma) globulin or antiviral drug?   No   For women: Are you pregnant or is there a chance you could become       pregnant during the next month?   Yes   Have you received any vaccinations in the past 4 weeks?   No     Immunization questionnaire was positive for at least one answer.  Notified Dr. Guerrero.        Per orders of Dr. Guerrero, injection of Tdap given by Irish Gonzáles MA. Patient instructed to remain in clinic for 15 minutes afterwards, and to report any adverse reaction to me immediately.       Screening performed by Irish Gonzáles MA on 3/30/2020 at 11:24 AM.    Clinic Administered Medication Documentation      Injectable Medication Documentation    Patient  was given Rhogam. Prior to medication administration, verified patients identity using patient s name and date of birth. Please see MAR and medication order for additional information. Patient instructed to remain in clinic for 15 minutes.      Was entire vial of medication used? Yes  Vial/Syringe: Syringe  Expiration Date:  2/7/2022  Was this medication supplied by the patient? No

## 2020-03-30 NOTE — PROGRESS NOTES
ADDIE    Doing overall well, through root canal done four days ago, having a lot of left jaw pain. Some abdominal/pelvic pressure, no concerning pregnancy symptoms. +FM. Denies contractions, VB, LOF, abnormal discharge, dysuria.     EXAM:  Vitals:    20 1015   BP: 114/60   BP Location: Right arm   Patient Position: Chair   Cuff Size: Adult Regular   Pulse: 99   Temp: 97.6  F (36.4  C)   TempSrc: Temporal   Weight: 77.2 kg (170 lb 3.2 oz)     Gen: Alert, oriented, appropriately interactive, NAD  Resp: good effort without distress   Abdomen: soft, gravid, non tender, non distended, no masses    FHR: 135  FH: 30    NOB labs: A-, Ab-, RI, HIV-, RPR-, HBSA-, GC/Chlam-  UCx NG      Ultrasound: Anatomy (20) Breech, Low-lying anterior placenta, Mild right hydroureteronephrosis, growth 67%  F/u Ultrasound: () Transverse, no previa, right maternal hydroureter similar to  the prior, placental lakes similar to the prior and are of doubtful clinical significance    ASSESSMENT/PLAN: Brittainy N Brachtl is a 29 year old  at 28w6d by LMP c/w 12w2d US who presents today for ADDIE.    Pregnancy complicated by   -Bleeding first trimester, s/p Rhogam in ER (). No subchorionic hem on ultrasound. No further bleeding. Repeat rhogam at 28wks (given 3/30/20)  -Asthma, last inhaler used 6 months ago when sick  -Carrier for CF, FOB not a carrier  -Low lying placenta w/ poss placental lake. Resolved, lakes stable  -Migraines without aura, no longer responding to tylenol. Previously on Imitrex. fiorcet prescribed.    1. Normal pregnancy in second trimester  - Labs & imaging as above  - Rhogam, Tdap, 28wk labs today  - ferrous gluconate (FERGON) 324 (38 Fe) MG tablet; Take 1 tablet (324 mg) by mouth every other day  Dispense: 30 tablet; Refill: 3    2. History of root canal procedure  - On Amoxicillin   - Letter given for dentist, OK to use narcotics when needed  - HYDROcodone-acetaminophen (NORCO) 5-325 MG tablet;  Take 1 tablet by mouth every 6 hours as needed for severe pain  Dispense: 10 tablet; Refill: 0    3. Postpartum  - Plans to breast feed  - Contraception, has failed OCPs, Depo, Mirena, Paragard, patch, will not use Nexplanon nor ring. Maybe interval tubal, though possibly one more child. To discuss again postpartum.     Param Guerrero MD   3/30/2020 10:45 AM

## 2020-03-30 NOTE — LETTER
[address] [address]  [phone]          March 30, 2020    RE:  Brittainy N Brachtl                                                                                                                                                       [id number] [address] [place] [place] [address]            To whom it may concern:    Brittainy N Brachtl is under my professional care for her pregnancy. She may receive narcotic pain medication as would be routinely indicated for dental procedures during pregnancy.       Sincerely,        Param Guerrero MD

## 2020-04-13 ENCOUNTER — MYC MEDICAL ADVICE (OUTPATIENT)
Dept: OBGYN | Facility: CLINIC | Age: 30
End: 2020-04-13

## 2020-04-13 ENCOUNTER — VIRTUAL VISIT (OUTPATIENT)
Dept: OBGYN | Facility: CLINIC | Age: 30
End: 2020-04-13
Payer: COMMERCIAL

## 2020-04-13 DIAGNOSIS — Z34.92 NORMAL PREGNANCY IN SECOND TRIMESTER: Primary | ICD-10-CM

## 2020-04-13 PROCEDURE — 99207 ZZC PRENATAL VISIT: CPT | Mod: TEL | Performed by: OBSTETRICS & GYNECOLOGY

## 2020-04-13 NOTE — PROGRESS NOTES
"Brittainy N Brachtl is a 29 year old female who is being evaluated via a billable telephone visit.      The patient has been notified of following:     \"This telephone visit will be conducted via a call between you and your physician/provider. We have found that certain health care needs can be provided without the need for a physical exam.  This service lets us provide the care you need with a short phone conversation.  If a prescription is necessary we can send it directly to your pharmacy.  If lab work is needed we can place an order for that and you can then stop by our lab to have the test done at a later time.    Telephone visits are billed at different rates depending on your insurance coverage. During this emergency period, for some insurers they may be billed the same as an in-person visit.  Please reach out to your insurance provider with any questions.    If during the course of the call the physician/provider feels a telephone visit is not appropriate, you will not be charged for this service.\"    Patient has given verbal consent for Telephone visit?  Yes    How would you like to obtain your AVS?       ADDIE Phone    Doing overall well though recent root canal, needs a follow up dental procedure though this is delayed until May, having some ongoing pain.   Otherwise, +FM. Denies contractions, VB, LOF, abnormal discharge, dysuria.     EXAM:  Gen: Alert, oriented, appropriately interactive, NAD    NOB labs: A-, Ab-, RI, HIV-, RPR-, HBSA-, GC/Chlam-  UCx NG       Ultrasound: Anatomy (20) Breech, Low-lying anterior placenta, Mild right hydroureteronephrosis, growth 67%  F/u Ultrasound: () Transverse, no previa, right maternal hydroureter similar to  the prior, placental lakes similar to the prior and are of doubtful clinical significance    ASSESSMENT/PLAN: Brittainy N Brachtl is a 29 year old  at 30w6d by LMP c/w 12w2d US with phone visit today for ADDIE.    Phone visit 15 " minutes     Pregnancy complicated by   -Bleeding first trimester, s/p Rhogam in ER (11/28). No subchorionic hemorrhage on ultrasound. No further bleeding. Repeat rhogam at 28wks (given 3/30/20)  -Asthma, last inhaler used 6 months ago when sick  -Carrier for CF, FOB not a carrier  -Low lying placenta w/ poss placental lake. Resolved, lakes stable  -Migraines without aura, no longer responding to tylenol. Previously on Imitrex. fiorcet prescribed.     1. Normal pregnancy in second trimester  - Labs & imaging as above  - S/p Rhogam, Tdap  - ferrous gluconate (FERGON) 324 (38 Fe) MG tablet; Take 1 tablet (324 mg) by mouth every other day  Dispense: 30 tablet; Refill: 3     2. History of root canal procedure  - Recent Amoxicillin   - Letter given for dentist, OK to use narcotics when needed  - Encouraged close f/u with dental surgeons as needed     3. Postpartum  - Plans to breast feed  - Contraception, has failed OCPs, Depo, Mirena, Paragard, patch, will not use Nexplanon nor ring. Maybe interval tubal, though possibly one more child. To discuss again postpartum.     Param Guerrero MD   4/13/2020 3:43 PM

## 2020-04-27 ENCOUNTER — PRENATAL OFFICE VISIT (OUTPATIENT)
Dept: OBGYN | Facility: CLINIC | Age: 30
End: 2020-04-27
Payer: COMMERCIAL

## 2020-04-27 ENCOUNTER — TELEPHONE (OUTPATIENT)
Dept: OBGYN | Facility: CLINIC | Age: 30
End: 2020-04-27

## 2020-04-27 VITALS
TEMPERATURE: 98.1 F | BODY MASS INDEX: 26.75 KG/M2 | WEIGHT: 170.8 LBS | HEART RATE: 92 BPM | SYSTOLIC BLOOD PRESSURE: 110 MMHG | DIASTOLIC BLOOD PRESSURE: 60 MMHG

## 2020-04-27 DIAGNOSIS — Z34.83 NORMAL PREGNANCY IN MULTIGRAVIDA IN THIRD TRIMESTER: Primary | ICD-10-CM

## 2020-04-27 PROCEDURE — 99207 ZZC PRENATAL VISIT: CPT | Performed by: OBSTETRICS & GYNECOLOGY

## 2020-04-27 RX ORDER — HYDROXYZINE PAMOATE 25 MG/1
25-50 CAPSULE ORAL
Qty: 30 CAPSULE | Refills: 1 | Status: SHIPPED | OUTPATIENT
Start: 2020-04-27 | End: 2020-05-28

## 2020-04-27 NOTE — NURSING NOTE
"Chief Complaint   Patient presents with     Prenatal Care       Initial /60 (BP Location: Right arm, Patient Position: Chair, Cuff Size: Adult Regular)   Pulse 92   Temp 98.1  F (36.7  C) (Temporal)   Wt 77.5 kg (170 lb 12.8 oz)   LMP 09/10/2019 (Exact Date)   BMI 26.75 kg/m   Estimated body mass index is 26.75 kg/m  as calculated from the following:    Height as of 20: 1.702 m (5' 7\").    Weight as of this encounter: 77.5 kg (170 lb 12.8 oz).  BP completed using cuff size: regular          Irish Gonzáles, SUE  2020      "

## 2020-04-27 NOTE — TELEPHONE ENCOUNTER
Patient is a 29 year old, , currently 32 weeks and 6 days.     RN will route to provider.     Molly Adam RN on 2020 at 2:12 PM

## 2020-04-27 NOTE — TELEPHONE ENCOUNTER
Pt just received a letter for jury duty for the month of June and July and she is reid in June and she will be nursing after delivery please give note for her to be excused. Mail to pt's home . Thank You [facility]  Please call her with any questions or concerns

## 2020-04-27 NOTE — PROGRESS NOTES
ADDIE    Doing well. Recent root canal with ongoing dental pain, said procedure was botched with dental file left inside, afraid to go back but has another procedure scheduled for next week. She states that are not giving her enough local because she is pregnant. Otherwise doing ok, +FM. Denies contractions, VB, LOF, abnormal discharge, dysuria.   She has a 2 & 9 yo at home, her son lives with his grandmother.     EXAM:  Vitals:    20 1103   BP: 110/60   BP Location: Right arm   Patient Position: Chair   Cuff Size: Adult Regular   Pulse: 92   Temp: 98.1  F (36.7  C)   TempSrc: Temporal   Weight: 77.5 kg (170 lb 12.8 oz)     Gen: Alert, oriented, appropriately interactive, NAD  Resp: good effort without distress   Abdomen: soft, gravid, non tender, non distended, no masses    FHR: 140  FH: 32    NOB labs: A-, Ab-, RI, HIV-, RPR-, HBSA-, GC/Chlam-  UCx NG      Ultrasound: Anatomy (20) Breech, Low-lying anterior placenta, Mild right hydroureteronephrosis, growth 67%  F/u Ultrasound: () Transverse, no previa, right maternal hydroureter similar to  the prior, placental lakes similar to the prior and are of doubtful clinical significance    ASSESSMENT/PLAN: Brittainy N Brachtl is a 29 year old  at 32w6d by LMP c/w 12w2d US who presents today for ADDIE.    Pregnancy complicated by   -Bleeding first trimester, s/p Rhogam in ER (). No subchorionic hem on ultrasound. No further bleeding. Repeat rhogam at 28wks (given 3/30/20)  -Asthma, last inhaler used 6 months ago when sick  -Carrier for CF, FOB not a carrier  -Low lying placenta w/ poss placental lake. Resolved, lakes stable  -Migraines without aura, no longer responding to tylenol. Previously on Imitrex. fiorcet prescribed.    1. Normal pregnancy in third trimester  - Labs & imaging as above  - S/p Rhogam, Tdap  - ferrous gluconate per Covid recommendations     2. Difficulty sleeping  - She is having difficulty falling asleep, then wakes up a  couple of hours later and cannot fall back asleep. Two kids at home, making loss of sleep even harder  - Reviewed sleep hygiene, recommending regular schedule, exercise in the evening, eliminating distractions  - would like to try melatonin, we reviewed that's its safety profile in pregnancy is not well know   - melatonin 1 MG TABS tablet; Take 1 tablet (1 mg) by mouth nightly as needed for sleep  Dispense: 30 tablet; Refill: 1  - hydrOXYzine (VISTARIL) 25 MG capsule; Take 1-2 capsules (25-50 mg) by mouth nightly as needed for itching  Dispense: 30 capsule; Refill: 1    Param Guerrero MD   4/27/2020 1:32 PM

## 2020-04-28 NOTE — TELEPHONE ENCOUNTER
Param Guerrero MD  You 1 minute ago (10:41 AM)      The letter has been sent, thanks        RN called patient to notify her. Unable to reach her via phone. Left detailed message relaying that a letter was sent out to excuse her from Jury Duty.     Molly Adam RN on 4/28/2020 at 10:43 AM

## 2020-05-11 ENCOUNTER — VIRTUAL VISIT (OUTPATIENT)
Dept: OBGYN | Facility: CLINIC | Age: 30
End: 2020-05-11
Payer: COMMERCIAL

## 2020-05-11 DIAGNOSIS — Z34.83 NORMAL PREGNANCY IN MULTIGRAVIDA IN THIRD TRIMESTER: Primary | ICD-10-CM

## 2020-05-11 PROCEDURE — 99207 ZZC PRENATAL VISIT: CPT | Performed by: OBSTETRICS & GYNECOLOGY

## 2020-05-20 ASSESSMENT — PATIENT HEALTH QUESTIONNAIRE - PHQ9
SUM OF ALL RESPONSES TO PHQ QUESTIONS 1-9: 5
10. IF YOU CHECKED OFF ANY PROBLEMS, HOW DIFFICULT HAVE THESE PROBLEMS MADE IT FOR YOU TO DO YOUR WORK, TAKE CARE OF THINGS AT HOME, OR GET ALONG WITH OTHER PEOPLE: NOT DIFFICULT AT ALL
SUM OF ALL RESPONSES TO PHQ QUESTIONS 1-9: 5

## 2020-05-21 ENCOUNTER — PRENATAL OFFICE VISIT (OUTPATIENT)
Dept: OBGYN | Facility: CLINIC | Age: 30
End: 2020-05-21
Payer: COMMERCIAL

## 2020-05-21 VITALS
BODY MASS INDEX: 27.38 KG/M2 | SYSTOLIC BLOOD PRESSURE: 110 MMHG | DIASTOLIC BLOOD PRESSURE: 70 MMHG | TEMPERATURE: 98.2 F | HEART RATE: 96 BPM | WEIGHT: 174.8 LBS

## 2020-05-21 DIAGNOSIS — Z34.83 NORMAL PREGNANCY IN MULTIGRAVIDA IN THIRD TRIMESTER: Primary | ICD-10-CM

## 2020-05-21 PROCEDURE — 99207 ZZC PRENATAL VISIT: CPT | Performed by: OBSTETRICS & GYNECOLOGY

## 2020-05-21 PROCEDURE — 87653 STREP B DNA AMP PROBE: CPT | Performed by: OBSTETRICS & GYNECOLOGY

## 2020-05-21 ASSESSMENT — PATIENT HEALTH QUESTIONNAIRE - PHQ9: SUM OF ALL RESPONSES TO PHQ QUESTIONS 1-9: 5

## 2020-05-21 NOTE — NURSING NOTE
"Chief Complaint   Patient presents with     Prenatal Care       Initial /70 (BP Location: Right arm, Patient Position: Chair, Cuff Size: Adult Regular)   Pulse 96   Temp 98.2  F (36.8  C) (Temporal)   Wt 79.3 kg (174 lb 12.8 oz)   LMP 09/10/2019 (Exact Date)   BMI 27.38 kg/m   Estimated body mass index is 27.38 kg/m  as calculated from the following:    Height as of 20: 1.702 m (5' 7\").    Weight as of this encounter: 79.3 kg (174 lb 12.8 oz).  BP completed using cuff size: regular        PHQ-9 score:    PHQ-9 SCORE 2020   PHQ-9 Total Score MyChart 5 (Mild depression)   PHQ-9 Total Score 5       Irish Gonzáles, [facility]  May 21, 2020      "

## 2020-05-21 NOTE — LETTER
[address] [address]  [phone]        May 21, 2020      Brittainy N Brachtl  [id number] [address] [id number]      Dear Brittainy N Brachtl,    As your health care provider, I am concerned that your age and/or underlying medical condition puts you at particularly high risk for serious complications should you contract a COVID-19 infection.     Please limit exposure to person's with known or high risk COVID. Please allow for immediate family members to also self-quarentine as needed to also avoid these exposures in the household.    COVID-19 is a new disease and there is limited information regarding risk factors for severe disease. Based on currently available information and clinical expertise, older adults and people of any age who have serious underlying medical conditions might be at higher risk for severe illness from COVID-19.     It is my medical opinion that you should avoid close contact with others and work from home if possible during this COVID-19 pandemic.     Tawana Pardo DO    [facility]

## 2020-05-21 NOTE — PROGRESS NOTES
Presents for routine  appointment.     No complaints.  Doing better after root canal.  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 Asymptomatic, requesting work-note to stay home to avoid further exposure close to delivery. Patient asymptomatic.     No LOF/VB/Ctxs.  +FM  ROS:   and GI negative.     /70 (BP Location: Right arm, Patient Position: Chair, Cuff Size: Adult Regular)   Pulse 96   Temp 98.2  F (36.8  C) (Temporal)   Wt 79.3 kg (174 lb 12.8 oz)   LMP 09/10/2019 (Exact Date)   BMI 27.38 kg/m       FHTs 160 bpm  FH 36cm  Cephalic by Leopold's     NOB labs: A-, Ab-, RI, HIV-, RPR-, HBSA-, GC/Chlam-  UCx NG       Ultrasound: Anatomy (20) Breech, Low-lying anterior placenta, Mild right hydroureteronephrosis, growth 67%  F/u Ultrasound: () Transverse, no previa, right maternal hydroureter similar to  the prior, placental lakes similar to the prior and are of doubtful clinical significance    A/P:  29 year old  at 36w2d ADDIE      ICD-10-CM    1. Normal pregnancy in multigravida in third trimester  Z34.83        Pregnancy Complications:  -Bleeding first trimester, s/p Rhogam in ER (). Repeat rhogam at 28wks (given 3/30/20)  -Asthma, last inhaler used 6 months ago when sick  -Carrier for CF, FOB not a carrier  -Low lying placenta w/ poss placental lake. Resolved, lakes stable  -Migraines without aura, improved with fiorcet. Previously on Imitrex.  - possibly exposed to COVID+ employee at work, patient requesting work note for him. Discussed that unable to write note specifically for the spouse, but did provide note for patient, including recommendation for family members to avoid contact as much as possible to high risk environments. Also advised strict quarantine at home, avoid outside contacts given possible exposure. If become symptomatic, to call clinic immediately for further guidance.     Routine Prenatal Care:  -Group B  Strep collected today  -PP Contraception- failed OCPs, Depo, Mirena, Paragard, patch, will not use Nexplanon nor ring. Interval tubal versus one more child.   -Plans to breasfeed  -Peds: discuss next visit  -Discussed labor and what to expect. Discussed when to go to the birth center.    Follow up in 1 week, all future visits in the office.    Tawana Pardo, DO      Answers for HPI/ROS submitted by the patient on 5/20/2020   If you checked off any problems, how difficult have these problems made it for you to do your work, take care of things at home, or get along with other people?: Not difficult at all  PHQ9 TOTAL SCORE: 5

## 2020-05-22 LAB
GP B STREP DNA SPEC QL NAA+PROBE: NEGATIVE
SPECIMEN SOURCE: NORMAL

## 2020-05-27 ENCOUNTER — PRENATAL OFFICE VISIT (OUTPATIENT)
Dept: OBGYN | Facility: CLINIC | Age: 30
End: 2020-05-27
Payer: COMMERCIAL

## 2020-05-27 VITALS
SYSTOLIC BLOOD PRESSURE: 120 MMHG | WEIGHT: 173.7 LBS | DIASTOLIC BLOOD PRESSURE: 78 MMHG | BODY MASS INDEX: 27.21 KG/M2 | TEMPERATURE: 98 F | HEART RATE: 107 BPM

## 2020-05-27 DIAGNOSIS — Z34.83 NORMAL PREGNANCY IN MULTIGRAVIDA IN THIRD TRIMESTER: Primary | ICD-10-CM

## 2020-05-27 PROCEDURE — 99207 ZZC PRENATAL VISIT: CPT | Performed by: OBSTETRICS & GYNECOLOGY

## 2020-05-27 NOTE — NURSING NOTE
"Chief Complaint   Patient presents with     Prenatal Care       Initial /78 (BP Location: Right arm, Patient Position: Chair, Cuff Size: Adult Regular)   Pulse 107   Temp 98  F (36.7  C) (Temporal)   Wt 78.8 kg (173 lb 11.2 oz)   LMP 09/10/2019 (Exact Date)   BMI 27.21 kg/m   Estimated body mass index is 27.21 kg/m  as calculated from the following:    Height as of 20: 1.702 m (5' 7\").    Weight as of this encounter: 78.8 kg (173 lb 11.2 oz).  BP completed using cuff size: regular        PHQ-9 score:    PHQ-9 SCORE 2020   PHQ-9 Total Score MyChart 5 (Mild depression)   PHQ-9 Total Score 5         Irish Gonzáles, [facility]  May 27, 2020    "

## 2020-05-28 NOTE — PROGRESS NOTES
ADDIE    Doing well. +FM. Denies contractions, VB, LOF, abnormal discharge, dysuria.     She has a 2 & 7 yo at home, her son lives with his grandmother.     EXAM:  Vitals:    20 1310   BP: 120/78   BP Location: Right arm   Patient Position: Chair   Cuff Size: Adult Regular   Pulse: 107   Temp: 98  F (36.7  C)   TempSrc: Temporal   Weight: 78.8 kg (173 lb 11.2 oz)     Gen: Alert, oriented, appropriately interactive, NAD  Resp: good effort without distress   Abdomen: soft, gravid, non tender, non distended, no masses  Cephalic by BSUS  Cvx: /high    FHR: 140  FH: 36    NOB labs: A-, Ab-, RI, HIV-, RPR-, HBSA-, GC/Chlam-  UCx NG    GBS-     Ultrasound: Anatomy (20) Breech, Low-lying anterior placenta, Mild right hydroureteronephrosis, growth 67%  F/u Ultrasound: () Transverse, no previa, right maternal hydroureter similar to  the prior, placental lakes similar to the prior and are of doubtful clinical significance    ASSESSMENT/PLAN: Brittainy N Brachtl is a 29 year old  at 37w2d by LMP c/w 12w2d US who presents today for ADDIE.     Pregnancy complicated by   -Bleeding first trimester, s/p Rhogam in ER (). No subchorionic hem on ultrasound. No further bleeding. Repeat rhogam at 28wks (given 3/30/20)  -Asthma, last inhaler used 6 months ago when sick  -Carrier for CF, FOB not a carrier  -Low lying placenta w/ poss placental lake. Resolved, lakes stable  -Migraines without aura, no longer responding to tylenol. Previously on Imitrex. fiorcet prescribed.     Normal pregnancy in third trimester  - Labs & imaging as above  - S/p Rhogam, Tdap  - ferrous gluconate per Covid recommendations     Postpartum   -Contraception- failed OCPs, Depo, Mirena, Paragard, patch, will not use Nexplanon nor ring. Interval tubal versus one more child.   -Plans to breasfeed  -Peds: Juliette   -Discussed labor and what to expect. Discussed when to go to the birth center.    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     Param Guerrero MD   5/28/2020 12:23 AM

## 2020-06-03 ENCOUNTER — PRENATAL OFFICE VISIT (OUTPATIENT)
Dept: OBGYN | Facility: CLINIC | Age: 30
End: 2020-06-03
Payer: COMMERCIAL

## 2020-06-03 VITALS
HEART RATE: 99 BPM | TEMPERATURE: 98 F | BODY MASS INDEX: 27.24 KG/M2 | SYSTOLIC BLOOD PRESSURE: 120 MMHG | DIASTOLIC BLOOD PRESSURE: 70 MMHG | WEIGHT: 173.9 LBS

## 2020-06-03 DIAGNOSIS — Z34.83 NORMAL PREGNANCY IN MULTIGRAVIDA IN THIRD TRIMESTER: Primary | ICD-10-CM

## 2020-06-03 PROCEDURE — 99207 ZZC PRENATAL VISIT: CPT | Performed by: OBSTETRICS & GYNECOLOGY

## 2020-06-03 NOTE — NURSING NOTE
"Chief Complaint   Patient presents with     Prenatal Care       Initial /70 (BP Location: Right arm, Patient Position: Chair, Cuff Size: Adult Regular)   Pulse 99   Temp 98  F (36.7  C) (Temporal)   Wt 78.9 kg (173 lb 14.4 oz)   LMP 09/10/2019 (Exact Date)   BMI 27.24 kg/m   Estimated body mass index is 27.24 kg/m  as calculated from the following:    Height as of 20: 1.702 m (5' 7\").    Weight as of this encounter: 78.9 kg (173 lb 14.4 oz).  BP completed using cuff size: regular        PHQ-9 score:    PHQ-9 SCORE 2020   PHQ-9 Total Score MyChart 5 (Mild depression)   PHQ-9 Total Score 5         Irish Gonzáles, [facility]  Judy 3, 2020    "

## 2020-06-03 NOTE — PROGRESS NOTES
Presents for routine  appointment.     No complaints.      No LOF/VB/Ctxs.  +FM  ROS:   and GI negative.     /70 (BP Location: Right arm, Patient Position: Chair, Cuff Size: Adult Regular)   Pulse 99   Temp 98  F (36.7  C) (Temporal)   Wt 78.9 kg (173 lb 14.4 oz)   LMP 09/10/2019 (Exact Date)   BMI 27.24 kg/m       FHTs 160 bpm  FH 130cm, EFW by Leopold's  BSUS: cephalic  SVE: /-3, soft, midposition    NOB labs: A-, Ab-, RI, HIV-, RPR-, HBSA-, GC/Chlam-  UCx NG       Ultrasound: Anatomy (20) Breech, Low-lying anterior placenta, Mild right hydroureteronephrosis, growth 67%  F/u Ultrasound: () Transverse, no previa, right maternal hydroureter similar to  the prior, placental lakes similar to the prior and are of doubtful clinical significance    A/P:  29 year old  at 38w1d ADDIE      ICD-10-CM    1. Normal pregnancy in multigravida in third trimester  Z34.83        Pregnancy Complications:  -Bleeding first trimester, s/p Rhogam in ER (). Repeat rhogam at 28wks (given 3/30/20)  -Asthma, last inhaler used 6 months ago when sick  -Carrier for CF, FOB not a carrier  -Low lying placenta w/ poss placental lake. Resolved, lakes stable      Routine Prenatal Care:  -Group B Strep collected today  -PP Contraception- failed OCPs, Depo, Mirena, Paragard, patch, will not use Nexplanon nor ring. Interval tubal versus one more child.   -Plans to breasfeed  -Peds: Dr. Lyons  -IOL: Discussed option for elective induction at 39 weeks. Patient would prefer to await spontaneous labor if possible, open to induction at 41 weeks if no spontaneous labor  -Discussed labor and what to expect. Discussed when to go to the birth center.    Follow up in 1 week    Tawana Pardo DO

## 2020-06-07 ENCOUNTER — HOSPITAL ENCOUNTER (INPATIENT)
Facility: CLINIC | Age: 30
LOS: 1 days | Discharge: HOME | End: 2020-06-08
Attending: FAMILY MEDICINE | Admitting: FAMILY MEDICINE
Payer: COMMERCIAL

## 2020-06-07 PROBLEM — Z34.90 TERM PREGNANCY: Status: ACTIVE | Noted: 2020-06-07

## 2020-06-07 LAB
AMPHETAMINES UR QL: NOT DETECTED NG/ML
BARBITURATES UR QL SCN: NOT DETECTED NG/ML
BASOPHILS # BLD AUTO: 0.1 10E9/L (ref 0–0.2)
BASOPHILS NFR BLD AUTO: 0.2 %
BENZODIAZ UR QL SCN: NOT DETECTED NG/ML
BLOOD BANK CMNT PATIENT-IMP: NORMAL
BUPRENORPHINE UR QL: NOT DETECTED NG/ML
CANNABINOIDS UR QL: NOT DETECTED NG/ML
COCAINE UR QL SCN: NOT DETECTED NG/ML
D-METHAMPHET UR QL: NOT DETECTED NG/ML
DIFFERENTIAL METHOD BLD: ABNORMAL
EOSINOPHIL NFR BLD AUTO: 0.5 %
ERYTHROCYTE [DISTWIDTH] IN BLOOD BY AUTOMATED COUNT: 13.9 % (ref 10–15)
HCT VFR BLD AUTO: 40.2 % (ref 35–47)
HGB BLD-MCNC: 13.4 G/DL (ref 11.7–15.7)
IMM GRANULOCYTES # BLD: 0.2 10E9/L (ref 0–0.4)
IMM GRANULOCYTES NFR BLD: 1 %
LYMPHOCYTES # BLD AUTO: 2.4 10E9/L (ref 0.8–5.3)
LYMPHOCYTES NFR BLD AUTO: 11.6 %
MCH RBC QN AUTO: 29.9 PG (ref 26.5–33)
MCHC RBC AUTO-ENTMCNC: 33.3 G/DL (ref 31.5–36.5)
MCV RBC AUTO: 90 FL (ref 78–100)
METHADONE UR QL SCN: NOT DETECTED NG/ML
MONOCYTES # BLD AUTO: 1.1 10E9/L (ref 0–1.3)
MONOCYTES NFR BLD AUTO: 5.1 %
NEUTROPHILS # BLD AUTO: 17.1 10E9/L (ref 1.6–8.3)
NEUTROPHILS NFR BLD AUTO: 81.6 %
NRBC # BLD AUTO: 0 10*3/UL
NRBC BLD AUTO-RTO: 0 /100
OPIATES UR QL SCN: NOT DETECTED NG/ML
OXYCODONE UR QL SCN: NOT DETECTED NG/ML
PCP UR QL SCN: NOT DETECTED NG/ML
PLATELET # BLD AUTO: 294 10E9/L (ref 150–450)
PROPOXYPH UR QL: NOT DETECTED NG/ML
RBC # BLD AUTO: 4.48 10E12/L (ref 3.8–5.2)
TRICYCLICS UR QL SCN: NOT DETECTED NG/ML
WBC # BLD AUTO: 20.9 10E9/L (ref 4–11)

## 2020-06-07 PROCEDURE — 25000128 H RX IP 250 OP 636

## 2020-06-07 PROCEDURE — 86900 BLOOD TYPING SEROLOGIC ABO: CPT | Performed by: FAMILY MEDICINE

## 2020-06-07 PROCEDURE — 72200001 ZZH LABOR CARE VAGINAL DELIVERY SINGLE

## 2020-06-07 PROCEDURE — 86901 BLOOD TYPING SEROLOGIC RH(D): CPT | Performed by: FAMILY MEDICINE

## 2020-06-07 PROCEDURE — 10907ZC DRAINAGE OF AMNIOTIC FLUID, THERAPEUTIC FROM PRODUCTS OF CONCEPTION, VIA NATURAL OR ARTIFICIAL OPENING: ICD-10-PCS | Performed by: FAMILY MEDICINE

## 2020-06-07 PROCEDURE — 86780 TREPONEMA PALLIDUM: CPT | Performed by: FAMILY MEDICINE

## 2020-06-07 PROCEDURE — 25000125 ZZHC RX 250: Performed by: FAMILY MEDICINE

## 2020-06-07 PROCEDURE — 59400 OBSTETRICAL CARE: CPT | Performed by: FAMILY MEDICINE

## 2020-06-07 PROCEDURE — 86870 RBC ANTIBODY IDENTIFICATION: CPT | Performed by: FAMILY MEDICINE

## 2020-06-07 PROCEDURE — 86850 RBC ANTIBODY SCREEN: CPT | Performed by: FAMILY MEDICINE

## 2020-06-07 PROCEDURE — 25000128 H RX IP 250 OP 636: Performed by: FAMILY MEDICINE

## 2020-06-07 PROCEDURE — 25000132 ZZH RX MED GY IP 250 OP 250 PS 637: Performed by: FAMILY MEDICINE

## 2020-06-07 PROCEDURE — 85025 COMPLETE CBC W/AUTO DIFF WBC: CPT | Performed by: FAMILY MEDICINE

## 2020-06-07 PROCEDURE — 12000000 ZZH R&B MED SURG/OB

## 2020-06-07 PROCEDURE — 80306 DRUG TEST PRSMV INSTRMNT: CPT | Performed by: FAMILY MEDICINE

## 2020-06-07 RX ORDER — BISACODYL 10 MG
10 SUPPOSITORY, RECTAL RECTAL DAILY PRN
Status: DISCONTINUED | OUTPATIENT
Start: 2020-06-09 | End: 2020-06-08 | Stop reason: HOSPADM

## 2020-06-07 RX ORDER — METHYLERGONOVINE MALEATE 0.2 MG/ML
200 INJECTION INTRAVENOUS
Status: DISCONTINUED | OUTPATIENT
Start: 2020-06-07 | End: 2020-06-07

## 2020-06-07 RX ORDER — AMOXICILLIN 250 MG
2 CAPSULE ORAL 2 TIMES DAILY
Status: DISCONTINUED | OUTPATIENT
Start: 2020-06-07 | End: 2020-06-08 | Stop reason: HOSPADM

## 2020-06-07 RX ORDER — OXYCODONE HYDROCHLORIDE 5 MG/1
5 TABLET ORAL EVERY 4 HOURS PRN
Status: DISCONTINUED | OUTPATIENT
Start: 2020-06-07 | End: 2020-06-08 | Stop reason: HOSPADM

## 2020-06-07 RX ORDER — IBUPROFEN 800 MG/1
800 TABLET, FILM COATED ORAL
Status: COMPLETED | OUTPATIENT
Start: 2020-06-07 | End: 2020-06-07

## 2020-06-07 RX ORDER — LIDOCAINE 40 MG/G
CREAM TOPICAL
Status: DISCONTINUED | OUTPATIENT
Start: 2020-06-07 | End: 2020-06-07

## 2020-06-07 RX ORDER — NALOXONE HYDROCHLORIDE 0.4 MG/ML
.1-.4 INJECTION, SOLUTION INTRAMUSCULAR; INTRAVENOUS; SUBCUTANEOUS
Status: DISCONTINUED | OUTPATIENT
Start: 2020-06-07 | End: 2020-06-08 | Stop reason: HOSPADM

## 2020-06-07 RX ORDER — ACETAMINOPHEN 325 MG/1
650 TABLET ORAL EVERY 4 HOURS PRN
Status: DISCONTINUED | OUTPATIENT
Start: 2020-06-07 | End: 2020-06-08 | Stop reason: HOSPADM

## 2020-06-07 RX ORDER — OXYTOCIN 10 [USP'U]/ML
INJECTION, SOLUTION INTRAMUSCULAR; INTRAVENOUS
Status: COMPLETED
Start: 2020-06-07 | End: 2020-06-07

## 2020-06-07 RX ORDER — OXYTOCIN/0.9 % SODIUM CHLORIDE 30/500 ML
100-340 PLASTIC BAG, INJECTION (ML) INTRAVENOUS CONTINUOUS PRN
Status: DISCONTINUED | OUTPATIENT
Start: 2020-06-07 | End: 2020-06-07

## 2020-06-07 RX ORDER — ONDANSETRON 2 MG/ML
4 INJECTION INTRAMUSCULAR; INTRAVENOUS EVERY 6 HOURS PRN
Status: DISCONTINUED | OUTPATIENT
Start: 2020-06-07 | End: 2020-06-07

## 2020-06-07 RX ORDER — TRANEXAMIC ACID 10 MG/ML
1 INJECTION, SOLUTION INTRAVENOUS EVERY 30 MIN PRN
Status: DISCONTINUED | OUTPATIENT
Start: 2020-06-07 | End: 2020-06-07

## 2020-06-07 RX ORDER — OXYTOCIN 10 [USP'U]/ML
10 INJECTION, SOLUTION INTRAMUSCULAR; INTRAVENOUS
Status: DISCONTINUED | OUTPATIENT
Start: 2020-06-07 | End: 2020-06-08 | Stop reason: CLARIF

## 2020-06-07 RX ORDER — ACETAMINOPHEN 325 MG/1
650 TABLET ORAL EVERY 4 HOURS PRN
Status: DISCONTINUED | OUTPATIENT
Start: 2020-06-07 | End: 2020-06-07

## 2020-06-07 RX ORDER — LIDOCAINE HYDROCHLORIDE 10 MG/ML
INJECTION, SOLUTION EPIDURAL; INFILTRATION; INTRACAUDAL; PERINEURAL
Status: DISCONTINUED
Start: 2020-06-07 | End: 2020-06-07 | Stop reason: HOSPADM

## 2020-06-07 RX ORDER — OXYTOCIN 10 [USP'U]/ML
10 INJECTION, SOLUTION INTRAMUSCULAR; INTRAVENOUS
Status: DISCONTINUED | OUTPATIENT
Start: 2020-06-07 | End: 2020-06-07

## 2020-06-07 RX ORDER — OXYTOCIN/0.9 % SODIUM CHLORIDE 30/500 ML
100 PLASTIC BAG, INJECTION (ML) INTRAVENOUS CONTINUOUS
Status: DISCONTINUED | OUTPATIENT
Start: 2020-06-07 | End: 2020-06-08 | Stop reason: HOSPADM

## 2020-06-07 RX ORDER — OXYTOCIN/0.9 % SODIUM CHLORIDE 30/500 ML
340 PLASTIC BAG, INJECTION (ML) INTRAVENOUS CONTINUOUS PRN
Status: DISCONTINUED | OUTPATIENT
Start: 2020-06-07 | End: 2020-06-08 | Stop reason: CLARIF

## 2020-06-07 RX ORDER — NALOXONE HYDROCHLORIDE 0.4 MG/ML
.1-.4 INJECTION, SOLUTION INTRAMUSCULAR; INTRAVENOUS; SUBCUTANEOUS
Status: DISCONTINUED | OUTPATIENT
Start: 2020-06-07 | End: 2020-06-07

## 2020-06-07 RX ORDER — SODIUM CHLORIDE, SODIUM LACTATE, POTASSIUM CHLORIDE, CALCIUM CHLORIDE 600; 310; 30; 20 MG/100ML; MG/100ML; MG/100ML; MG/100ML
INJECTION, SOLUTION INTRAVENOUS CONTINUOUS
Status: DISCONTINUED | OUTPATIENT
Start: 2020-06-07 | End: 2020-06-07

## 2020-06-07 RX ORDER — TRANEXAMIC ACID 10 MG/ML
1 INJECTION, SOLUTION INTRAVENOUS EVERY 30 MIN PRN
Status: DISCONTINUED | OUTPATIENT
Start: 2020-06-07 | End: 2020-06-08 | Stop reason: CLARIF

## 2020-06-07 RX ORDER — HYDROCORTISONE 2.5 %
CREAM (GRAM) TOPICAL 3 TIMES DAILY PRN
Status: DISCONTINUED | OUTPATIENT
Start: 2020-06-07 | End: 2020-06-08 | Stop reason: HOSPADM

## 2020-06-07 RX ORDER — CARBOPROST TROMETHAMINE 250 UG/ML
250 INJECTION, SOLUTION INTRAMUSCULAR
Status: DISCONTINUED | OUTPATIENT
Start: 2020-06-07 | End: 2020-06-07

## 2020-06-07 RX ORDER — MODIFIED LANOLIN
OINTMENT (GRAM) TOPICAL
Status: DISCONTINUED | OUTPATIENT
Start: 2020-06-07 | End: 2020-06-08 | Stop reason: HOSPADM

## 2020-06-07 RX ORDER — AMOXICILLIN 250 MG
1 CAPSULE ORAL 2 TIMES DAILY
Status: DISCONTINUED | OUTPATIENT
Start: 2020-06-07 | End: 2020-06-08 | Stop reason: HOSPADM

## 2020-06-07 RX ORDER — IBUPROFEN 800 MG/1
800 TABLET, FILM COATED ORAL EVERY 6 HOURS PRN
Status: DISCONTINUED | OUTPATIENT
Start: 2020-06-07 | End: 2020-06-08 | Stop reason: HOSPADM

## 2020-06-07 RX ORDER — OXYCODONE AND ACETAMINOPHEN 5; 325 MG/1; MG/1
1 TABLET ORAL
Status: DISCONTINUED | OUTPATIENT
Start: 2020-06-07 | End: 2020-06-07

## 2020-06-07 RX ORDER — FENTANYL CITRATE 50 UG/ML
50-100 INJECTION, SOLUTION INTRAMUSCULAR; INTRAVENOUS
Status: DISCONTINUED | OUTPATIENT
Start: 2020-06-07 | End: 2020-06-07

## 2020-06-07 RX ADMIN — ACETAMINOPHEN 650 MG: 325 TABLET, FILM COATED ORAL at 16:41

## 2020-06-07 RX ADMIN — OXYCODONE HYDROCHLORIDE 5 MG: 5 TABLET ORAL at 14:46

## 2020-06-07 RX ADMIN — IBUPROFEN 800 MG: 800 TABLET ORAL at 16:07

## 2020-06-07 RX ADMIN — IBUPROFEN 800 MG: 800 TABLET ORAL at 09:54

## 2020-06-07 RX ADMIN — ACETAMINOPHEN 650 MG: 325 TABLET, FILM COATED ORAL at 12:44

## 2020-06-07 RX ADMIN — SENNOSIDES AND DOCUSATE SODIUM 1 TABLET: 8.6; 5 TABLET ORAL at 23:29

## 2020-06-07 RX ADMIN — FENTANYL CITRATE 100 MCG: 50 INJECTION INTRAMUSCULAR; INTRAVENOUS at 09:51

## 2020-06-07 RX ADMIN — IBUPROFEN 800 MG: 800 TABLET ORAL at 21:50

## 2020-06-07 RX ADMIN — OXYCODONE HYDROCHLORIDE 5 MG: 5 TABLET ORAL at 19:07

## 2020-06-07 RX ADMIN — ACETAMINOPHEN 650 MG: 325 TABLET, FILM COATED ORAL at 21:50

## 2020-06-07 RX ADMIN — LIDOCAINE HYDROCHLORIDE 1 ML: 10 INJECTION, SOLUTION EPIDURAL; INFILTRATION; INTRACAUDAL; PERINEURAL at 08:44

## 2020-06-07 RX ADMIN — OXYCODONE HYDROCHLORIDE 5 MG: 5 TABLET ORAL at 23:29

## 2020-06-07 RX ADMIN — OXYTOCIN 10 UNITS: 10 INJECTION, SOLUTION INTRAMUSCULAR; INTRAVENOUS at 09:27

## 2020-06-07 RX ADMIN — Medication: at 16:41

## 2020-06-07 NOTE — L&D DELIVERY NOTE
OB Vaginal Delivery Note    Brittainy N Brachtl MRN# [id number]   Age: 29 year old YOB: 1990     Patient presented to labor and delivery in active labor dilated to 8 cm.  She had been wiliam for 3 to 4 hours prior to presentation.  She declined any anesthesia due to past issues with epidural.  After discussion, AROM was performed with return of clear fluid.  Approximately 20 minutes later, patient was ready to deliver and fully dilated.    GA: 38w5d  GP:   Labor Complications: None   EBL:   mL  Delivery QBL:    Delivery Type: Vaginal, Spontaneous   ROM to Delivery Time: (Delivered) Minutes: 16  Ulen Weight: 3.525 kg (7 lb 12.3 oz)    1 Minute 5 Minute 10 Minute   Apgar Totals: 10   10        TOBIAS DUNN JENNIFER LYNN     Delivery Details:  Brittainy N Brachtl, a 29 year old  female delivered a viable infant with apgars of 10  and 10 . Patient was fully dilated and pushing after 4  hours 18  minutes in active labor. Delivery was via vaginal, spontaneous  to a sterile field under none  anesthesia. Infant delivered in vertex  right  occiput  anterior  position. Anterior and posterior shoulders delivered without difficulty. The cord was clamped, cut twice and 3 vessels  were noted. Cord blood was obtained in routine fashion with the following disposition: lab .      Cord complications: nuchal X2  Placenta delivered at 2020  9:23 AM . Placental disposition was Hospital disposal . Fundal massage performed and fundus found to be firm.     Episiotomy: none    Perineum, vagina, cervix were inspected, and the following lacerations were noted:   Perineal lacerations: 1st   periurethral laceration: bilateral             Lacerations didn't require repair, very small, minimal bleeding.    Excellent hemostasis was noted. Needle count correct. Infant and patient in delivery room in good and stable condition.     Due to rapid delivery, patient was not able to get a COVID-19 test  swab completed.  This was offered to her after delivery, but she declined.  She states that she and her  have been self isolating at home for 2 weeks.  Since most of the interventions that we would perform for a positive test could not be completed, we will continue standard precautions.       Labor Event Times    Labor onset date:  20 Onset time:   5:00 AM   Dilation complete date:  20 Complete time:   9:18 AM   Start pushing date/time:  2020 0918      Labor Length    1st Stage (hrs):  4 (min):  18   2nd Stage (hrs):  0 (min):  1   3rd Stage (hrs):  0 (min):  4      Labor Events    Labor Type:  Spontaneous     Antibiotics received during labor?:  No     Rupture date/time: 20   Rupture type:  Artificial Rupture of Membranes  Fluid color:  Clear  Fluid odor:  Normal     1:1 continuous labor support provided by?:  RN Labor partogram used?:  no      Delivery/Placenta Date and Time    Delivery Date:  20 Delivery Time:   9:19 AM   Placenta Date/Time:  2020  9:23 AM  Oxytocin given at the time of delivery:  after delivery of placenta     Vaginal Counts     Initial count performed by 2 team members:   Two Team Members   MD Sandra Ramirez, CHANTAL       [place] [employer] [place] [employer]   Initial counts 2  5    Added to count       Final counts 2  5    Placed during labor Accounted for at the end of labor   No NA   No NA   No NA    Final count performed by 2 team members:   Two Team Members   MD Sandra Ramirez, RN      Final count correct?:  Yes     Apgars    Living status:  Living   1 Minute 5 Minute 10 Minute 15 Minute 20 Minute   Skin color: 2  2       Heart rate: 2  2       Reflex irritability: 2  2       Muscle tone: 2  2       Respiratory effort: 2  2       Total: 10  10          Cord    Vessels:  3 Vessels Complications:  Nuchal   Cord Blood Disposition:  Lab Gases Sent?:  No       Resuscitation    Methods:  None  Output in Delivery  "Room:  [facility]     Bulan Measurements    Weight:  7 lb 12.3 oz Length:  1' 8\"   Head circumference:  14 cm Chest circumference:  13.3 cm      Skin to Skin and Feeding Plan    Skin to skin initiation date/time: 1841    Skin to skin with:  Mother  Skin to skin end date/time: 1841    Breastfeeding initiated date/time:  2020 0945  How do you plan to feed your baby:  Breastfeeding     Labor Events and Shoulder Dystocia    Fetal Tracing Prior to Delivery:  Category 2  Shoulder dystocia present?:  Neg     Delivery (Maternal) (Provider to Complete) ([id number])    Episiotomy:  None  Perineal lacerations:  1st Repaired?:  No   Periurethral laceration:  bilateral Repaired?:  No      Blood Loss  Mother: Brachtl, Brittainy N #[id number]   Start of Mother's Information    IO Blood Loss  20 0500 - 20 1030    None           End of Mother's Information  Mother: Brachtl, Brittainy N #[id number]         Delivery - Provider to Complete ([id number])    Delivering clinician:  Flaco Lowe MD  Attempted Delivery Types (Choose all that apply):  Spontaneous Vaginal Delivery  Delivery Type (Choose the 1 that will go to the Birth History):  Vaginal, Spontaneous   Other personnel:   Provider Role   Mariah Duque RN Registered Nurse   Sandra Rodriguez RN Registered Nurse         Placenta    Delayed Cord Clamping:  Done  Date/Time:  2020  9:23 AM  Removal:  Spontaneous  Disposition:  Hospital disposal     Anesthesia    Method:  None          Presentation and Position    Presentation:  Vertex  Position:  Right Occiput Anterior           Flaco Lowe MD, MD  "

## 2020-06-07 NOTE — PROGRESS NOTES
S: Delivery  B: spontaneous Labor,  @ 29luy0cygd gestation, GBS negative   A: Patient delivered Vaginal at 0919 with Dr. Lowe in attendance. Baby delivered and placed on mother's low abdomen for delayed cord clamping where baby was dried and stimulated. After cord clamped and cut, baby was placed skin to skin on mother's chest within 5 minutes following delivery . Apgars 10/10. Placenta was delivered @ 0923 followed by administration of oxytocin. Bonding initiated with mom and baby. Educated mother on importance of exclusive breast feeding, expected feeding readiness cues and encouraged her to observe for feeding cues. Mother informed that breast feeding assistance would be provided. See flowsheet for VS and PP checks. Labor care plan goals met.  R: Expect routine postpartum care. Anticipate first feeding within the hour.

## 2020-06-07 NOTE — PROGRESS NOTES
S:  Admission  B:  Omar is a  @ 38w5d gestation, GBS neg, Hep. B neg, pregnancy uncomplicated.  A:  Patient admitted to room 333 for spontaneous labor @ 0830. SVE- 8cm, 100%, +1. History of precipitous delivery with previous pregnancy.  notified of pt arrival and immanent delivery. IV placed and delivery room prepared.   R: Anticipate spontaneous delivery.    Dr. Lowe here to perform AROM @0903, fluid color  clear and moderate amount noted. Cervix is now 8cm cm. Patient repositioned and FHR stable after procedure. Will continue to observe for progress and recheck cervix in the next 1-2 hours.

## 2020-06-07 NOTE — H&P
"  2020    Brittainy N Brachtl  [id number]            OB Admit History & Physical      Ms. Brachtl  is here in spontaneous labor.    She has noticed contractions since about 5 AM today.  These have progressively gotten stronger, so she presented for evaluation at the hospital.    Patient's last menstrual period was 09/10/2019 (exact date).   Her Estimated Date of Delivery: 2020  , making her 38w5d  wks.      Estimated body mass index is 27.24 kg/m  as calculated from the following:    Height as of 20: 1.702 m (5' 7\").    Weight as of 6/3/20: 78.9 kg (173 lb 14.4 oz).  Her prenatal course has been complicated by low-lying placenta, placental lake, migraine headaches, dental infection.    See prenatal for labs.  Negative3 GBBS, Rubella Immune, RH negative    Estimated fetal weight= 3500 gm       She is a 29 year old   Her OB history:   OB History    Para Term  AB Living   4 3 3 0 0 3   SAB TAB Ectopic Multiple Live Births   0 0 0 0 3      # Outcome Date GA Lbr [place]cad[place]/2nd Weight Sex Delivery Anes PTL Lv   4 Current            3 Term 17 39w0d  3.606 kg (7 lb 15.2 oz) F   N MADAY      Name: BRACHTL,BG BRITTAINY      Apgar1: 8  Apgar5: 9   2 Term     M    MADAY   1 Term     F    MADAY            Past Medical History:   Diagnosis Date     Asthma           Past Surgical History:   Procedure Laterality Date     GALLBLADDER SURGERY               No current outpatient medications on file.       Allergies: Patient has no known allergies.      REVIEW OF SYSTEMS:  NEUROLOGIC:  Negative  EYES:  Negative  ENT:  Negative  GI:  Negative  BREAST:  Negative  :  Negative  GYN:  Negative  CV:  Negative  PULMONARY:  Negative  MUSCULOSKELETAL:  Negative  PSYCH:  Negative        Social History     Socioeconomic History     Marital status:      Spouse name: Not on file     Number of children: Not on file     Years of education: Not on file     Highest education level: Not on file "   Occupational History     Not on file   Social Needs     Financial resource strain: Not on file     Food insecurity     Worry: Not on file     Inability: Not on file     Transportation needs     Medical: Not on file     Non-medical: Not on file   Tobacco Use     Smoking status: Light Tobacco Smoker     Smokeless tobacco: Never Used     Tobacco comment: 1-3 daily   Substance and Sexual Activity     Alcohol use: Not Currently     Drug use: Never     Sexual activity: Yes     Partners: Male   Lifestyle     Physical activity     Days per week: Not on file     Minutes per session: Not on file     Stress: Not on file   Relationships     Social connections     Talks on phone: Not on file     Gets together: Not on file     Attends Rastafari service: Not on file     Active member of club or organization: Not on file     Attends meetings of clubs or organizations: Not on file     Relationship status: Not on file     Intimate partner violence     Fear of current or ex partner: Not on file     Emotionally abused: Not on file     Physically abused: Not on file     Forced sexual activity: Not on file   Other Topics Concern     Not on file   Social History Narrative     Not on file      No family history on file.          Vitals:   , reassuring  With contractions every  3-5 min    Alert Awake in NAD  HEENT grossly normal  Neck: no lymphadenopathy or thryoidomegaly  Lungs CTAB  Back no spinal or CVAT  Heart no MRG, regular  ABD gravid, non-tender on exam with fundus palpable  Pelvic:  no fluid noted, no blood noted  Cervix is 8 cm / 100 % effaced at +2 station, AROM performed with clear fluid  EXT:  no edema or calf tenderness  Neuro:  Grossly intact    Assessment:  IUP at 38w5d  In spontaneous labor at term.  She would like to avoid epidural/intrathecal anesthesia as this didn't work well for her in the past. AROM performed after discussion with patient.  Clear fluid present.     Plan:  Continue normal antepartum cares.   Anticipate NVD.    [unfilled]      Flaco Lowe MD, MD CAR  Dept of OB/GYN  June 7, 2020

## 2020-06-07 NOTE — PROGRESS NOTES
S: Transfer to postpartum @ 1135  B: Vaginal birth @ 0919, small tear, no repair, breast feeding    A: Mother and baby transferred to postpartum unit at 1135 via ambulate after completion of immediate recovery period. Patient oriented to room. Mother and baby bonding well and in satisfactory condition upon transfer.  R: Anticipate routine postpartum care.

## 2020-06-08 VITALS
HEART RATE: 76 BPM | TEMPERATURE: 98 F | OXYGEN SATURATION: 97 % | DIASTOLIC BLOOD PRESSURE: 73 MMHG | RESPIRATION RATE: 16 BRPM | SYSTOLIC BLOOD PRESSURE: 112 MMHG

## 2020-06-08 LAB
ABO + RH BLD: NORMAL
ABO + RH BLD: NORMAL
BLOOD BANK CMNT PATIENT-IMP: NORMAL
DATE RH IMM GL GVN: NORMAL
FETAL CELL SCN BLD QL ROSETTE: NORMAL
RH IG VIALS RECOM PATIENT: NORMAL
T PALLIDUM AB SER QL: NONREACTIVE

## 2020-06-08 PROCEDURE — 25000128 H RX IP 250 OP 636: Performed by: FAMILY MEDICINE

## 2020-06-08 PROCEDURE — 36415 COLL VENOUS BLD VENIPUNCTURE: CPT | Performed by: FAMILY MEDICINE

## 2020-06-08 PROCEDURE — 25000132 ZZH RX MED GY IP 250 OP 250 PS 637: Performed by: FAMILY MEDICINE

## 2020-06-08 PROCEDURE — 86901 BLOOD TYPING SEROLOGIC RH(D): CPT | Performed by: FAMILY MEDICINE

## 2020-06-08 PROCEDURE — 86900 BLOOD TYPING SEROLOGIC ABO: CPT | Performed by: FAMILY MEDICINE

## 2020-06-08 PROCEDURE — 85461 HEMOGLOBIN FETAL: CPT | Performed by: FAMILY MEDICINE

## 2020-06-08 RX ORDER — AMOXICILLIN 250 MG
1 CAPSULE ORAL 2 TIMES DAILY PRN
Qty: 30 TABLET | Refills: 0 | Status: SHIPPED | OUTPATIENT
Start: 2020-06-08 | End: 2020-07-27

## 2020-06-08 RX ORDER — ACETAMINOPHEN 325 MG/1
650 TABLET ORAL EVERY 4 HOURS PRN
COMMUNITY
Start: 2020-06-08 | End: 2020-12-30

## 2020-06-08 RX ORDER — IBUPROFEN 800 MG/1
800 TABLET, FILM COATED ORAL EVERY 8 HOURS PRN
Qty: 90 TABLET | Refills: 0 | Status: SHIPPED | OUTPATIENT
Start: 2020-06-08 | End: 2020-12-30

## 2020-06-08 RX ORDER — MODIFIED LANOLIN
OINTMENT (GRAM) TOPICAL
COMMUNITY
Start: 2020-06-08 | End: 2020-07-27

## 2020-06-08 RX ORDER — OXYCODONE HYDROCHLORIDE 5 MG/1
5 TABLET ORAL EVERY 6 HOURS PRN
Qty: 12 TABLET | Refills: 0 | Status: SHIPPED | OUTPATIENT
Start: 2020-06-08 | End: 2020-07-27

## 2020-06-08 RX ADMIN — OXYCODONE HYDROCHLORIDE 5 MG: 5 TABLET ORAL at 08:01

## 2020-06-08 RX ADMIN — IBUPROFEN 800 MG: 800 TABLET ORAL at 09:57

## 2020-06-08 RX ADMIN — IBUPROFEN 800 MG: 800 TABLET ORAL at 03:51

## 2020-06-08 RX ADMIN — Medication: at 06:47

## 2020-06-08 RX ADMIN — ACETAMINOPHEN 650 MG: 325 TABLET, FILM COATED ORAL at 06:45

## 2020-06-08 RX ADMIN — ACETAMINOPHEN 650 MG: 325 TABLET, FILM COATED ORAL at 10:52

## 2020-06-08 RX ADMIN — HUMAN RHO(D) IMMUNE GLOBULIN 300 MCG: 300 INJECTION, SOLUTION INTRAMUSCULAR at 08:06

## 2020-06-08 RX ADMIN — SENNOSIDES AND DOCUSATE SODIUM 1 TABLET: 8.6; 5 TABLET ORAL at 08:05

## 2020-06-08 RX ADMIN — Medication: at 10:35

## 2020-06-08 RX ADMIN — ACETAMINOPHEN 650 MG: 325 TABLET, FILM COATED ORAL at 02:00

## 2020-06-08 RX ADMIN — OXYCODONE HYDROCHLORIDE 5 MG: 5 TABLET ORAL at 03:51

## 2020-06-08 NOTE — PLAN OF CARE
Vss. Pt has had uterine cramping, controlled with pr tylenol, Ibuprofen and oxycodone. Pt has been independent in room, with infant cares and breastfeeding. Has inverted L nipple, using nipple everter and has used nipple shield intermittently. Using lanolin to nipples. FF @ U/1, light lochia flow. Will continue with plan of care.

## 2020-06-08 NOTE — PROGRESS NOTES
S: Shift review; [id number]  B:Omar is a  who delivered vaginally on 2020; nearly precipitous delivery; no pain meds;  small tear, no repair; using cameron spray and tucks and tub soak for comfort.    A: VSS, patient is independent with mobility, cramping pain significant so plan made for routine use of available pain meds for comfort. Handles baby with confidence; repeat brstfdr; encouraged tummy to tummy positioning and full latch in order to avoid nipple trauma she experienced in the past.   R: Continue with routine PP care. Anticipate visit from lactation nurse in the morning prior to discharge.

## 2020-06-12 NOTE — PROGRESS NOTES
"Brittainy N Brachtl  Gender: female  : 1990  [id number]  [place] [address] [phone] (home)   Medical Record: [id number]  Primary Care Provider: Olinda Stark       [facility]   ?   Discharge Phone Call: Key Words/Key Times     How are you and the baby? Both are doing well.  Pt continues to have some cramping that she is taking ibuprofen for.  She feels like it is the same type of cramping she had after having her other kids and isn't concerned about it. [place] is doing well.  How are feedings going? [place] is feeding every 2-3 hours, sometimes more frequently when cluster feeding. Milk is in.  Nipples have been sore but are improving since leaving the hospital.  Using APNO cream.     Voiding & Stooling?  WNL  Any questions or concerns? denies  Follow-up appointment? Had a follow up appointment on Wednesday and has another one scheduled for next week.   We want to provide excellent care here at [facility]. Do you have any feedback for us that would help us improve?   \"This is the first place I've delivered where I didn't get something like a blanket or a swaddler\" (4th baby)  "

## 2020-06-29 LAB
ABO + RH BLD: ABNORMAL
ABO + RH BLD: ABNORMAL
BLD GP AB INVEST PLASRBC-IMP: ABNORMAL
BLD GP AB SCN SERPL QL: ABNORMAL
BLOOD BANK CMNT PATIENT-IMP: ABNORMAL
BLOOD BANK CMNT PATIENT-IMP: ABNORMAL
SPECIMEN EXP DATE BLD: ABNORMAL

## 2020-07-08 ENCOUNTER — OFFICE VISIT (OUTPATIENT)
Dept: FAMILY MEDICINE | Facility: CLINIC | Age: 30
End: 2020-07-08
Payer: COMMERCIAL

## 2020-07-08 DIAGNOSIS — G43.009 MIGRAINE WITHOUT AURA AND WITHOUT STATUS MIGRAINOSUS, NOT INTRACTABLE: Primary | ICD-10-CM

## 2020-07-08 PROCEDURE — 99214 OFFICE O/P EST MOD 30 MIN: CPT | Performed by: FAMILY MEDICINE

## 2020-07-08 RX ORDER — SUMATRIPTAN 50 MG/1
50 TABLET, FILM COATED ORAL
Qty: 9 TABLET | Refills: 1 | Status: SHIPPED | OUTPATIENT
Start: 2020-07-08 | End: 2020-08-25

## 2020-07-08 NOTE — PROGRESS NOTES
Subjective     Brittainy N Brachtl is a 29 year old female who presents to clinic today for the following health issues:    HPI       Migraine     Since your last clinic visit, how have your headaches changed?  Worsened    How often are you getting headaches or migraines? migraines     Are you able to do normal daily activities when you have a migraine? Yes    Are you taking rescue/relief medications? (Select all that apply) ibuprofen (Advil, Motrin) and Tylenol    How helpful is your rescue/relief medication?  I get no relief    Are you taking any medications to prevent migraines? (Select all that apply)  No    In the past 4 weeks, how often have you gone to urgent care or the emergency room because of your headaches?  0      How many servings of fruits and vegetables do you eat daily?  2-3    On average, how many sweetened beverages do you drink each day (Examples: soda, juice, sweet tea, etc.  Do NOT count diet or artificially sweetened beverages)?   1    How many days per week do you exercise enough to make your heart beat faster? 3 or less    How many minutes a day do you exercise enough to make your heart beat faster? 9 or less    How many days per week do you miss taking your medication? 0    Omar is here with her dtr for [facility] and requests to be seen due to almost daily headaches since delivery. She is nursing her dtr and drinks a couple large water bottles daily. She has a history of migraines prior to pregnancy, and used imitrex years ago but stopped it when her insurance lapsed.   She gets pain on the top of head or behind eye, usually one sided but can affect both. No aura. She is light and sound sensitive during HA, and some nausea as well. No emesis. She can wake with them and can last all day. OTC meds (benadryl, tyl, ibu) no help. She does not have a headache at this moment.   No other vision change.     Patient Active Problem List   Diagnosis     Migraine without aura and without status  migrainosus, not intractable     Past Surgical History:   Procedure Laterality Date     GALLBLADDER SURGERY      2009       Social History     Tobacco Use     Smoking status: Light Tobacco Smoker     Smokeless tobacco: Never Used     Tobacco comment: 1-3 daily   Substance Use Topics     Alcohol use: Not Currently     History reviewed. No pertinent family history.        Reviewed and updated as needed this visit by Provider  Tobacco  Allergies  Meds  Problems  Med Hx  Surg Hx  Fam Hx         Review of Systems   Constitutional, HEENT, cardiovascular, pulmonary, gi and gu systems are negative, except as otherwise noted.      Objective    BP (P) 112/80   Pulse (P) 60   Resp (P) 12   LMP 09/10/2019 (Exact Date)   There is no height or weight on file to calculate BMI.  Physical Exam   Vitals noted.  Patient alert, oriented, and in no acute distress.   Eyes:  PERRLA, EOMI.  Fundi normal bilaterally.   Ears:  Canals clear, TM's nl bilaterally.  No erythema or fluid.   Oral:  Oropharynx nl without erythema, exudate, mass or other lesions.   Neck:  Supple without lymphadenopathy, JVD or masses.   CV:  RRR without murmur.   Respiratory:  Lungs clear to auscultation bilaterally.   Gait normal. Motor grossly normal.       A:      ICD-10-CM    1. Migraine without aura and without status migrainosus, not intractable  G43.009 SUMAtriptan (IMITREX) 50 MG tablet       P:  We discussed increased water intake and she may add magnesium if desired but thinks she did that in past without relief. Will resume Imitrex, trial of 50 mg initial dose. Instructions given. If not effective, could consider raising dose to 100 at onset or another triptan. She would prefer NOT to take a daily med while nursing. Encouraged ibuprofen and water in addition at onset of headache. Could also consider chiro or massage therapy or PT if needed.     Rosario Casillas MD

## 2020-07-09 PROBLEM — Z34.90 TERM PREGNANCY: Status: RESOLVED | Noted: 2020-06-07 | Resolved: 2020-07-09

## 2020-07-09 PROBLEM — G43.009 MIGRAINE WITHOUT AURA AND WITHOUT STATUS MIGRAINOSUS, NOT INTRACTABLE: Status: ACTIVE | Noted: 2020-07-09

## 2020-07-27 ENCOUNTER — PRENATAL OFFICE VISIT (OUTPATIENT)
Dept: FAMILY MEDICINE | Facility: CLINIC | Age: 30
End: 2020-07-27
Payer: COMMERCIAL

## 2020-07-27 ENCOUNTER — MYC MEDICAL ADVICE (OUTPATIENT)
Dept: FAMILY MEDICINE | Facility: CLINIC | Age: 30
End: 2020-07-27

## 2020-07-27 VITALS
SYSTOLIC BLOOD PRESSURE: 116 MMHG | HEART RATE: 78 BPM | OXYGEN SATURATION: 99 % | RESPIRATION RATE: 14 BRPM | BODY MASS INDEX: 23.65 KG/M2 | DIASTOLIC BLOOD PRESSURE: 72 MMHG | WEIGHT: 151 LBS | TEMPERATURE: 97.8 F

## 2020-07-27 DIAGNOSIS — D72.829 LEUKOCYTOSIS, UNSPECIFIED TYPE: ICD-10-CM

## 2020-07-27 DIAGNOSIS — G43.009 MIGRAINE WITHOUT AURA AND WITHOUT STATUS MIGRAINOSUS, NOT INTRACTABLE: ICD-10-CM

## 2020-07-27 DIAGNOSIS — G43.009 MIGRAINE WITHOUT AURA AND WITHOUT STATUS MIGRAINOSUS, NOT INTRACTABLE: Primary | ICD-10-CM

## 2020-07-27 LAB
BASOPHILS # BLD AUTO: 0.1 10E9/L (ref 0–0.2)
BASOPHILS NFR BLD AUTO: 0.5 %
DIFFERENTIAL METHOD BLD: NORMAL
EOSINOPHIL NFR BLD AUTO: 2.1 %
ERYTHROCYTE [DISTWIDTH] IN BLOOD BY AUTOMATED COUNT: 13.1 % (ref 10–15)
HCT VFR BLD AUTO: 45.5 % (ref 35–47)
HGB BLD-MCNC: 14.7 G/DL (ref 11.7–15.7)
IMM GRANULOCYTES # BLD: 0 10E9/L (ref 0–0.4)
IMM GRANULOCYTES NFR BLD: 0.3 %
LYMPHOCYTES # BLD AUTO: 2.9 10E9/L (ref 0.8–5.3)
LYMPHOCYTES NFR BLD AUTO: 27.2 %
MCH RBC QN AUTO: 29 PG (ref 26.5–33)
MCHC RBC AUTO-ENTMCNC: 32.3 G/DL (ref 31.5–36.5)
MCV RBC AUTO: 90 FL (ref 78–100)
MONOCYTES # BLD AUTO: 0.6 10E9/L (ref 0–1.3)
MONOCYTES NFR BLD AUTO: 5.2 %
NEUTROPHILS # BLD AUTO: 7 10E9/L (ref 1.6–8.3)
NEUTROPHILS NFR BLD AUTO: 64.7 %
NRBC # BLD AUTO: 0 10*3/UL
NRBC BLD AUTO-RTO: 0 /100
PLATELET # BLD AUTO: 267 10E9/L (ref 150–450)
RBC # BLD AUTO: 5.07 10E12/L (ref 3.8–5.2)
WBC # BLD AUTO: 10.8 10E9/L (ref 4–11)

## 2020-07-27 PROCEDURE — 99207 ZZC POST PARTUM EXAM: CPT | Performed by: FAMILY MEDICINE

## 2020-07-27 PROCEDURE — 99213 OFFICE O/P EST LOW 20 MIN: CPT | Mod: 25 | Performed by: FAMILY MEDICINE

## 2020-07-27 PROCEDURE — 85025 COMPLETE CBC W/AUTO DIFF WBC: CPT | Performed by: FAMILY MEDICINE

## 2020-07-27 PROCEDURE — 36415 COLL VENOUS BLD VENIPUNCTURE: CPT | Performed by: FAMILY MEDICINE

## 2020-07-27 NOTE — PROGRESS NOTES
Omar is here for a 6-week postpartum checkup.    She had a  of a viable girl, weight 7 pounds 12 oz., with no complications. Date of delivery was 2020. Since delivery, she has been breast feeding.  She has no signs of infection, bleeding or other complications.  She is not pregnant.  We discussed contraception options and she has chosen none.  Would like to conceive again soon.     Post partum tubal: No  History of Gestational Diabetes? No  Type of Delivery:  Vaginal  Feeding Method:  Breast  If initiated breast feeding and stopped, how long did you breast feed?:  n/a    REVIEW OF SYSTEMS:  Ears/Nose/Throat: negative  Respiratory: negative  Cardiovascular: negative  Gastrointestinal: negative  Genitourinary: negative  Musculoskeletal: negative    Neurologic: positive for migraine headache, not as bad as previous but woke with it today and fairly bad. She has tried imitrex 50 mg and it didn't help. Hasn't tried 100 mg. Using water and magnesium.   Skin: negative   Endocrine:  negative  Psych:negative for postpartum depression      Past Medical History:   Diagnosis Date     Asthma        Past Surgical History:   Procedure Laterality Date     GALLBLADDER SURGERY             No family history on file.        EXAM:  /72   Pulse 78   Temp 97.8  F (36.6  C) (Temporal)   Resp 14   Wt 68.5 kg (151 lb)   LMP 09/10/2019 (Exact Date)   SpO2 99%   BMI 23.65 kg/m    HEENT: grossly normal.  NECK: no lymphadenopathy or thyroidomegaly.  LUNGS: CTA X 2, no rales or crackles.  BACK: No spinal or CVA tenderness.  HEART: RRR without murmurs clicks or gallops.  ABDOMEN: soft, non tender, good bowel sounds, without masses rebound, guarding or tenderness.  INCISION: n/a  PELVIC:  Exam deferred    EXTREMITIES:  warm to touch, good pulses, no ankle edema or calf tenderness.  NEUROLOGIC: grossly normal.    ASSESSMENT:   6-week postpartum exam after .      ICD-10-CM    1. Routine postpartum follow-up  Z39.2     2. Leukocytosis, unspecified type  D72.829 CBC with platelets and differential   3. Migraine without aura and without status migrainosus, not intractable  G43.009 ondansetron (ZOFRAN-ODT) 4 MG ODT tab     magnesium gluconate (MAGONATE) 500 (27 Mg) MG tablet        PLAN:    Contraception methods discussed, declines. Discussed fertility, goal of conception but not right away.   Follow up in 1 year or sooner as needed.   CBC obtained due to previous elevated WBC.  Discussed headaches. Will try 100 mg imitrex at home and may repeat after 2 hours. If this dose doesn't help will need to change meds, may need to try narcotic, as many of the rescue meds are not known to be safe in breastfeeding. Could try tylenol, magnesium, zofran, as needed, although she has tried most of this already without benefit.     Rosario Casillas MD

## 2020-07-28 RX ORDER — UREA 10 %
500 LOTION (ML) TOPICAL 2 TIMES DAILY
Qty: 30 TABLET | Refills: 1 | Status: SHIPPED | OUTPATIENT
Start: 2020-07-28 | End: 2020-12-30

## 2020-07-28 RX ORDER — HYDROCODONE BITARTRATE AND ACETAMINOPHEN 5; 325 MG/1; MG/1
1-2 TABLET ORAL EVERY 6 HOURS PRN
Qty: 10 TABLET | Refills: 0 | Status: SHIPPED | OUTPATIENT
Start: 2020-07-28 | End: 2020-08-25

## 2020-07-28 RX ORDER — ONDANSETRON 4 MG/1
4 TABLET, ORALLY DISINTEGRATING ORAL EVERY 8 HOURS PRN
Qty: 10 TABLET | Refills: 1 | Status: SHIPPED | OUTPATIENT
Start: 2020-07-28 | End: 2022-06-03 | Stop reason: DRUGHIGH

## 2020-08-19 ENCOUNTER — TELEPHONE (OUTPATIENT)
Dept: FAMILY MEDICINE | Facility: CLINIC | Age: 30
End: 2020-08-19

## 2020-08-19 NOTE — TELEPHONE ENCOUNTER
If its for pain control, would prefer her to take ibuprofen, tylenol, pain meds    Never heard of a dentist prescribing dex before

## 2020-08-19 NOTE — TELEPHONE ENCOUNTER
Patient called back and states she was taking tylenol and ibuprofen and neither of them were helping that's why she was prescribed Dex and she doesn't feel comfortable taking this while breast feeding, if there is an alternative she can be prescribed

## 2020-08-19 NOTE — TELEPHONE ENCOUNTER
Patient informed to contact dentist to see if they can switch due to no openings and per Dr. Olivier patient would need to be seen to switch pain medication. Patient understood and will contact the dentist.  Staci Goyal MA

## 2020-08-19 NOTE — TELEPHONE ENCOUNTER
Reason for Call:  Other      Detailed comments:  Had a root canal on 8/17 and was prescribed Dexamethasone. She is currently breastfeeding and would like to know if this medication is safe to take while breastfeeding.    Phone Number Patient can be reached at: Home number on file [phone] (home)    Best Time: any     Can we leave a detailed message on this number? YES    Call taken on 8/19/2020 at 11:04 AM by Karina Mijares

## 2020-08-25 DIAGNOSIS — G43.009 MIGRAINE WITHOUT AURA AND WITHOUT STATUS MIGRAINOSUS, NOT INTRACTABLE: ICD-10-CM

## 2020-08-25 RX ORDER — HYDROCODONE BITARTRATE AND ACETAMINOPHEN 5; 325 MG/1; MG/1
1-2 TABLET ORAL EVERY 6 HOURS PRN
Qty: 10 TABLET | Refills: 0 | Status: SHIPPED | OUTPATIENT
Start: 2020-08-25 | End: 2020-10-19

## 2020-08-25 RX ORDER — SUMATRIPTAN 100 MG/1
100 TABLET, FILM COATED ORAL
Qty: 9 TABLET | Refills: 3 | Status: SHIPPED | OUTPATIENT
Start: 2020-08-25 | End: 2021-04-28

## 2020-10-19 ENCOUNTER — MYC REFILL (OUTPATIENT)
Dept: FAMILY MEDICINE | Facility: CLINIC | Age: 30
End: 2020-10-19

## 2020-10-19 DIAGNOSIS — G43.009 MIGRAINE WITHOUT AURA AND WITHOUT STATUS MIGRAINOSUS, NOT INTRACTABLE: ICD-10-CM

## 2020-10-21 RX ORDER — HYDROCODONE BITARTRATE AND ACETAMINOPHEN 5; 325 MG/1; MG/1
1-2 TABLET ORAL EVERY 6 HOURS PRN
Qty: 10 TABLET | Refills: 0 | Status: SHIPPED | OUTPATIENT
Start: 2020-10-21 | End: 2020-12-01

## 2020-10-21 RX ORDER — SUMATRIPTAN 100 MG/1
100 TABLET, FILM COATED ORAL
Qty: 9 TABLET | Refills: 3 | Status: CANCELLED | OUTPATIENT
Start: 2020-10-21

## 2020-10-21 NOTE — TELEPHONE ENCOUNTER
Imitrex has refills from August, Please verify how many times she has had filled since last Rx was sent on 8/25/20.   I will approve the Norco.   Rosario Casillas MD

## 2020-11-13 ENCOUNTER — OFFICE VISIT (OUTPATIENT)
Dept: INTERNAL MEDICINE | Facility: CLINIC | Age: 30
End: 2020-11-13
Payer: COMMERCIAL

## 2020-11-13 VITALS
BODY MASS INDEX: 23.49 KG/M2 | DIASTOLIC BLOOD PRESSURE: 74 MMHG | RESPIRATION RATE: 18 BRPM | SYSTOLIC BLOOD PRESSURE: 126 MMHG | HEART RATE: 120 BPM | WEIGHT: 150 LBS | OXYGEN SATURATION: 98 % | TEMPERATURE: 97.9 F

## 2020-11-13 DIAGNOSIS — H00.011 HORDEOLUM EXTERNUM OF RIGHT UPPER EYELID: Primary | ICD-10-CM

## 2020-11-13 PROCEDURE — 99213 OFFICE O/P EST LOW 20 MIN: CPT | Performed by: INTERNAL MEDICINE

## 2020-11-13 RX ORDER — AMOXICILLIN 500 MG/1
500 CAPSULE ORAL 3 TIMES DAILY
Qty: 21 CAPSULE | Refills: 0 | Status: SHIPPED | OUTPATIENT
Start: 2020-11-13 | End: 2020-12-30

## 2020-11-13 ASSESSMENT — PAIN SCALES - GENERAL: PAINLEVEL: NO PAIN (0)

## 2020-11-13 NOTE — PROGRESS NOTES
Subjective     Brittainy N Brachtl is a 30 year old female who presents to clinic today for the following health issues:    HPI         Chief Complaint   Patient presents with     Eye Problem     cyst on right eye for 1 month, redness. No mattering. Getting bigger       Has noted spot for the last month. Seems to be getting larger. A little tender today.     Review of Systems   Constitutional, HEENT, cardiovascular, pulmonary, gi and gu systems are negative, except as otherwise noted.      Objective    /74 (BP Location: Left arm, Patient Position: Sitting, Cuff Size: Adult Regular)   Pulse 120   Temp 97.9  F (36.6  C) (Temporal)   Resp 18   Wt 68 kg (150 lb)   SpO2 98%   Breastfeeding Yes   BMI 23.49 kg/m    Body mass index is 23.49 kg/m .  Physical Exam   GENERAL: healthy, alert and no distress  EYES: Eyes grossly normal to inspection, PERRL and conjunctivae and sclerae normal  HENT: hearing intact  NECK: no adenopathy, no asymmetry, masses, or scars and thyroid normal to palpation  RESP: lungs clear to auscultation - no rales, rhonchi or wheezes  CV: regular rate and rhythm, normal S1 S2, no S3 or S4, no murmur, click or rub, no peripheral edema and peripheral pulses strong  MS: no gross musculoskeletal defects noted, no edema  SKIN: right eyelid mildly erythematous nodule  NEURO: Normal strength and tone, mentation intact and speech normal  PSYCH: mentation appears normal, affect normal/bright    Assessment & Plan     Hordeolum externum of right upper eyelid  Continue warm compresses and start antibiotic. If things not improved over the next couple of weeks she will let us know.   - amoxicillin (AMOXIL) 500 MG capsule  Dispense: 21 capsule; Refill: 0       Tobacco Cessation:   reports that she has been smoking. She has never used smokeless tobacco.       Follow-up with primary care provider as scheduled.  Return here if ocular concerns worsen.      This patient has been interviewed, examined,  diagnosed, and informed of the above by me personally.  Medical records and available pertinent information has been reviewed by me personally.  All decisions and discussion have been between myself and the patient/family.  This was done in the presence of Jacob Rosado MD  , who acted as a medical scribe and recorded the events above.  No diagnosis or decision making was made by the above-mentioned scribe.  The patient, and or his/her ensurors will not be billed for the presence or actions of this scribe.  The information recorded by the scribe has been reviewed by me and found to be accurate.      Saji Willett [facility]

## 2020-11-17 ENCOUNTER — TELEPHONE (OUTPATIENT)
Dept: FAMILY MEDICINE | Facility: CLINIC | Age: 30
End: 2020-11-17

## 2020-11-17 NOTE — TELEPHONE ENCOUNTER
Reason for Call:  Same Day Appointment, Requested Provider:  Rosario Casillas MD    PCP: Olinda Stark    Reason for visit: possible bursitis in hips     Duration of symptoms:      Have you been treated for this in the past?   Yes    Additional comments: Omar scheduled for 12/1 for hip bursitis and wondering if you could see her before this date. She is wondering if you could work her in.     Can we leave a detailed message on this number? YES    Phone number patient can be reached at: Home number on file [phone] (home)    Best Time: any     Call taken on 11/17/2020 at 3:05 PM by Karina Mijares

## 2020-11-17 NOTE — TELEPHONE ENCOUNTER
Talked with patient informed of no openings at this time will watch for opening patient is good with this and will keep 12/1 appointment.  Staci Goyal MA

## 2020-11-23 ENCOUNTER — MYC MEDICAL ADVICE (OUTPATIENT)
Dept: FAMILY MEDICINE | Facility: CLINIC | Age: 30
End: 2020-11-23

## 2020-11-23 DIAGNOSIS — B37.31 YEAST INFECTION OF THE VAGINA: Primary | ICD-10-CM

## 2020-11-23 NOTE — TELEPHONE ENCOUNTER
Patient messaging that she had been on an antibiotic and is now experiencing yeast infection, requesting Fluconazole. Please advise. Precious Lorenzo LPN

## 2020-11-25 RX ORDER — FLUCONAZOLE 150 MG/1
150 TABLET ORAL ONCE
Qty: 2 TABLET | Refills: 0 | Status: SHIPPED | OUTPATIENT
Start: 2020-11-25 | End: 2020-11-25

## 2020-12-01 ENCOUNTER — OFFICE VISIT (OUTPATIENT)
Dept: FAMILY MEDICINE | Facility: CLINIC | Age: 30
End: 2020-12-01
Payer: COMMERCIAL

## 2020-12-01 VITALS
RESPIRATION RATE: 16 BRPM | TEMPERATURE: 97.1 F | WEIGHT: 150.8 LBS | SYSTOLIC BLOOD PRESSURE: 120 MMHG | BODY MASS INDEX: 23.62 KG/M2 | DIASTOLIC BLOOD PRESSURE: 68 MMHG | HEART RATE: 89 BPM | OXYGEN SATURATION: 99 %

## 2020-12-01 DIAGNOSIS — M70.61 GREATER TROCHANTERIC BURSITIS OF RIGHT HIP: Primary | ICD-10-CM

## 2020-12-01 DIAGNOSIS — M54.50 CHRONIC RIGHT-SIDED LOW BACK PAIN WITHOUT SCIATICA: ICD-10-CM

## 2020-12-01 DIAGNOSIS — G89.29 CHRONIC RIGHT-SIDED LOW BACK PAIN WITHOUT SCIATICA: ICD-10-CM

## 2020-12-01 PROCEDURE — 20610 DRAIN/INJ JOINT/BURSA W/O US: CPT | Mod: RT | Performed by: FAMILY MEDICINE

## 2020-12-01 RX ORDER — HYDROCODONE BITARTRATE AND ACETAMINOPHEN 5; 325 MG/1; MG/1
1-2 TABLET ORAL EVERY 6 HOURS PRN
Qty: 10 TABLET | Refills: 0 | Status: SHIPPED | OUTPATIENT
Start: 2020-12-01 | End: 2020-12-29

## 2020-12-01 NOTE — PROGRESS NOTES
Subjective     Brittainy N Brachtl is a 30 year old female who presents to clinic today for the following health issues:    HPI         Concern - Bursitis in hips  Onset: years  Description: feels like bones are rubbing together, pain at times shots to lower back  Intensity: moderate  Progression of Symptoms:  same and intermittent  Accompanying Signs & Symptoms: nothing  Previous history of similar problem: yes started after 12 year was born  Precipitating factors:        Worsened by: depends on what does during day  Alleviating factors:        Improved by: rest  Therapies tried and outcome: Cortizone injections     She has pain in the R>L hips, first started with first pregnancy. Has had bursitis, has had injections in the past which have been helpful.   She has had increased pain from a flare up recently, in the past 3 weeks. It feels like her hips are grinding together and she has pain with stepping down on the right foot, feels like a shooting pain and pain that goes into her low back near tailbone. Never into buttocks or leg.     She has a limited Rx for Norco she was given for headaches but would like a small Rx for this hip pain if possible.     Review of Systems   7 pt ROS is otherwise negative except as noted in HPI.          Objective    /68   Pulse 89   Temp 97.1  F (36.2  C) (Temporal)   Resp 16   Wt 68.4 kg (150 lb 12.8 oz)   SpO2 99%   BMI 23.62 kg/m    Body mass index is 23.62 kg/m .  Physical Exam   She is cautious to get up and down but able to do so independently. She has her infant in a carrier car seat which she is able to lift and manage. However, her gait is tensed to the right. On sitting she is asymptomatic with significant tension in her lower right back around the hip, SI joint and lumbar paraspinal muscles. Massage reveals tension in the muscles and is painful.   She has FROM both hips but pain in the back with motion of the R>L legs. No pain with impaction exercise and no  pain in hip with full exam. She is tender to palpation over the right greater trochanter. No rash or bruising, no deformity.       Assessment & Plan       ICD-10-CM    1. Greater trochanteric bursitis of right hip  M70.61 HYDROcodone-acetaminophen (NORCO) 5-325 MG tablet     DRAIN/INJECT LARGE JOINT/BURSA     methylPREDNISolone (DEPO-MEDROL) 40 MG/ML injection   2. Chronic right-sided low back pain without sciatica  M54.5     G89.29          Tobacco Cessation:   reports that she has been smoking. She has never used smokeless tobacco.  Not addressed today     We discussed the different types of injections that can be given into the hip.  I advised her that I do not do intra-articular injections and she describes the previous injections that have been helpful have been the trochanteric bursa injections.  I did agree to do that again today at her request.      Procedure:  The skin over the right greater trochanter was prepped with Betadine x3 and I used a mixture of 3 cc of 1% plain lidocaine and 1 cc of methylprednisolone 40 mg/mL.  Using a 25-gauge needle I injected the entire mixture into the point of maximal tenderness in the bursa overlying the greater trochanter.  A bandage was placed with bacitracin.  She tolerated this well.    I discussed postinjection care with her.  Advised motion but avoiding extreme exertion.  May use ice and/or heat alternating for pain control.  Will monitor for worsening in the first 24 hours and hopefully note improvement in the next 2 to 3 days lasting weeks to months.  If not getting improvement will need to follow-up.    I also stressed to her the importance of back care.  She should consider seeing a chiropractor or massage but definitely recommended some stretching for her.    I did agree to refill a limited quantity of her Norco.      No follow-ups on file.    Rosario Casillas MD  [facility]

## 2020-12-02 PROBLEM — G89.29 CHRONIC RIGHT-SIDED LOW BACK PAIN WITHOUT SCIATICA: Status: ACTIVE | Noted: 2020-12-02

## 2020-12-02 PROBLEM — M54.50 CHRONIC RIGHT-SIDED LOW BACK PAIN WITHOUT SCIATICA: Status: ACTIVE | Noted: 2020-12-02

## 2020-12-02 RX ORDER — METHYLPREDNISOLONE ACETATE 40 MG/ML
40 INJECTION, SUSPENSION INTRA-ARTICULAR; INTRALESIONAL; INTRAMUSCULAR; SOFT TISSUE ONCE
Qty: 1 ML | Refills: 0
Start: 2020-12-02 | End: 2020-12-02

## 2020-12-02 NOTE — PATIENT INSTRUCTIONS
Patient Education     Understanding Trochanteric Bursitis    A bursa is a thin, slippery, sac-like film. It contains a small amount of fluid. This structure is found between bones and soft tissues in and around joints. A bursa cushions and protects a joint. It keeps parts of a joint from rubbing against each other. If a bursa becomes inflamed and irritated, it's known as bursitis.   The trochanteric bursa is found on the hip joint. It lies on top of the bump at the top of the thighbone called the greater trochanter. Inflammation of this bursa is called trochanteric bursitis.   How to say it   [id number]  Causes of trochanteric bursitis  Causes may include:    Overuse of the hip during running or other sports, dance, or work    Falling on or irritation to the side of the hip  This condition may occur along with other problems, such as osteoarthritis of the hip or knee, or low back problems. In rare cases, it may occur after hip surgery.   Symptoms of trochanteric bursitis    Pain or aching on the side of the hip. It's often felt as a sharp, intense pain. The pain may travel down the leg.    Swelling, tenderness, or warmth on the side of the hip at the bony bump at the top of the thigh    Treatment for trochanteric bursitis  These may include:    Resting the hip. This allows the bursa to heal.    Prescription or over-the-counter pain medicines. These help reduce inflammation, swelling, and pain. NSAIDs (nonsteroidal anti-inflammatory drugs) are the most common medicines used. Medicines may be prescribed or bought over the counter. They may be given as pills. Or they may be put on the skin as a gel, cream, or patch.    Cold packs and heat packs. These help reduce pain and swelling.    Stretching and strengthening exercises. These improve flexibility and strength around the hip.    Physical therapy. This includes exercises or other treatments.    Injections of medicine into the bursa.  This may help reduce  inflammation and relieve symptoms. The medicine is usually a corticosteroid. This is a strong anti-inflammatory medicine.  Possible complications  If you don t give your hip time to heal, the problem may not go away, may return, or may get worse. Rest and treat your hip as directed.   When to call your healthcare provider  Call your healthcare provider right away if you have any of these:    Fever of 100.4 F (38 C) or higher, or as directed by your provider    Redness, swelling, or warmth that gets worse    Symptoms that don t get better with prescribed medicines, or get worse    New symptoms  [employer] last reviewed this educational content on 6/1/2019 2000-2020 [employer]. [address] [place] [address], [place] [address]. All rights reserved. This information is not intended as a substitute for professional medical care. Always follow your healthcare professional's instructions.

## 2020-12-29 ENCOUNTER — HOSPITAL ENCOUNTER (OUTPATIENT)
Dept: GENERAL RADIOLOGY | Facility: CLINIC | Age: 30
End: 2020-12-29
Attending: FAMILY MEDICINE
Payer: COMMERCIAL

## 2020-12-29 ENCOUNTER — OFFICE VISIT (OUTPATIENT)
Dept: FAMILY MEDICINE | Facility: CLINIC | Age: 30
End: 2020-12-29
Payer: COMMERCIAL

## 2020-12-29 VITALS
SYSTOLIC BLOOD PRESSURE: 116 MMHG | HEART RATE: 82 BPM | DIASTOLIC BLOOD PRESSURE: 62 MMHG | TEMPERATURE: 98.2 F | BODY MASS INDEX: 23.37 KG/M2 | WEIGHT: 149.2 LBS | OXYGEN SATURATION: 98 % | RESPIRATION RATE: 14 BRPM

## 2020-12-29 DIAGNOSIS — M70.62 TROCHANTERIC BURSITIS OF LEFT HIP: Primary | ICD-10-CM

## 2020-12-29 DIAGNOSIS — G43.009 MIGRAINE WITHOUT AURA AND WITHOUT STATUS MIGRAINOSUS, NOT INTRACTABLE: ICD-10-CM

## 2020-12-29 DIAGNOSIS — M25.552 CHRONIC HIP PAIN, BILATERAL: ICD-10-CM

## 2020-12-29 DIAGNOSIS — M25.551 CHRONIC HIP PAIN, BILATERAL: ICD-10-CM

## 2020-12-29 DIAGNOSIS — G89.29 CHRONIC HIP PAIN, BILATERAL: ICD-10-CM

## 2020-12-29 DIAGNOSIS — M54.41 CHRONIC BILATERAL LOW BACK PAIN WITH BILATERAL SCIATICA: ICD-10-CM

## 2020-12-29 DIAGNOSIS — M70.61 GREATER TROCHANTERIC BURSITIS OF RIGHT HIP: ICD-10-CM

## 2020-12-29 DIAGNOSIS — G89.29 CHRONIC BILATERAL LOW BACK PAIN WITH BILATERAL SCIATICA: ICD-10-CM

## 2020-12-29 DIAGNOSIS — M54.42 CHRONIC BILATERAL LOW BACK PAIN WITH BILATERAL SCIATICA: ICD-10-CM

## 2020-12-29 LAB
CRP SERPL-MCNC: <2.9 MG/L (ref 0–8)
ERYTHROCYTE [SEDIMENTATION RATE] IN BLOOD BY WESTERGREN METHOD: 6 MM/H (ref 0–20)

## 2020-12-29 PROCEDURE — 20610 DRAIN/INJ JOINT/BURSA W/O US: CPT | Mod: LT | Performed by: FAMILY MEDICINE

## 2020-12-29 PROCEDURE — 99214 OFFICE O/P EST MOD 30 MIN: CPT | Mod: 25 | Performed by: FAMILY MEDICINE

## 2020-12-29 PROCEDURE — 85652 RBC SED RATE AUTOMATED: CPT | Performed by: FAMILY MEDICINE

## 2020-12-29 PROCEDURE — 86140 C-REACTIVE PROTEIN: CPT | Performed by: FAMILY MEDICINE

## 2020-12-29 PROCEDURE — 72100 X-RAY EXAM L-S SPINE 2/3 VWS: CPT

## 2020-12-29 PROCEDURE — 73523 X-RAY EXAM HIPS BI 5/> VIEWS: CPT

## 2020-12-29 PROCEDURE — 36415 COLL VENOUS BLD VENIPUNCTURE: CPT | Performed by: FAMILY MEDICINE

## 2020-12-29 RX ORDER — HYDROCODONE BITARTRATE AND ACETAMINOPHEN 5; 325 MG/1; MG/1
1-2 TABLET ORAL DAILY PRN
Qty: 10 TABLET | Refills: 0 | Status: SHIPPED | OUTPATIENT
Start: 2020-12-29 | End: 2021-02-09

## 2020-12-29 RX ORDER — METOCLOPRAMIDE 10 MG/1
10 TABLET ORAL DAILY PRN
Qty: 15 TABLET | Refills: 0 | Status: SHIPPED | OUTPATIENT
Start: 2020-12-29 | End: 2022-03-25

## 2020-12-29 ASSESSMENT — PAIN SCALES - GENERAL: PAINLEVEL: SEVERE PAIN (7)

## 2020-12-29 NOTE — PROGRESS NOTES
Subjective     Brittainy N Brachtl is a 30 year old female who presents to clinic today for the following health issues:    HPI         Patient is in to have greater trochanteric bursa injection.  I saw her earlier this month for a right sided injection and now she would like to have her left done.  She got some good relief from the right injection.  It did not take away her pain completely but help diminish it.  She spent the day at her mom's on 12/19 and was on her feet for a good part of the day.  This made her hurt more in the low back and hips.  She has chronic low back and bilateral hip pain and she is wondering if she could have some early onset arthritis.  She has arthritis in her family.  She states her mother and grandmother both have osteoarthritis and her great-grandmother has some arthritis as well.  She does not think anybody has rheumatoid arthritis in the family.  She would like some sort of testing to see if she has arthritis.    She is also requesting a refill on her pain medication.  She takes Norco for headaches and 1 pill does not cut it.  She has to take 2 at a time.  I gave her a prescription for 10 pills on December 1 and she is running out.  She thinks she has tried either Toradol or tramadol in the past.  She thinks it was Toradol.  She has tried ibuprofen and Naprosyn as well.  She also sometimes gets shooting pain in her arms.  If she rests her elbows on the table her forearms and hands can hurt and go numb and then she gets shooting pain that goes up into her shoulders and neck as well.    She has not done physical therapy.    Patient Active Problem List    Diagnosis Date Noted     Chronic hip pain, bilateral 12/30/2020     Priority: Medium     Chronic bilateral low back pain with bilateral sciatica 12/02/2020     Priority: Medium     Greater trochanteric bursitis of right hip 12/02/2020     Priority: Medium     Migraine without aura and without status migrainosus, not intractable  07/09/2020     Priority: Medium        Past Medical History:   Diagnosis Date     Asthma         Past Surgical History:   Procedure Laterality Date     GALLBLADDER SURGERY      2009        History reviewed. No pertinent family history.      Review of Systems   Constitutional, HEENT, cardiovascular, pulmonary, gi and gu systems are negative, except as otherwise noted.      Objective    /62   Pulse 82   Temp 98.2  F (36.8  C) (Temporal)   Resp 14   Wt 67.7 kg (149 lb 3.2 oz)   SpO2 98%   BMI 23.37 kg/m    Body mass index is 23.37 kg/m .  Physical Exam   Vitals noted.  Patient alert, oriented, and in no acute distress.  She is tender with palpation over the left greater trochanter of the hip.  There is no overlying skin change, no erythema, swelling or bruising.  No deformity.    After informed consent, I prepped the skin with alcohol wipe and Betadine x3.  I injected a mixture of 3 cc of 1% plain lidocaine and 1 cc of Depo-Medrol 40 with a 25-gauge 1 inch needle.  Band-Aid with bacitracin was applied.  She tolerated this well without complication.    Orders Placed This Encounter   Procedures     DRAIN/INJECT LARGE JOINT/BURSA     XR Lumbar Spine 2/3 Views     XR Pelvis and Hip Bilateral 2 Views     CRP, inflammation     ESR: Erythrocyte sedimentation rate            Assessment & Plan       ICD-10-CM    1. Trochanteric bursitis of left hip  M70.62 CRP, inflammation     ESR: Erythrocyte sedimentation rate     DRAIN/INJECT LARGE JOINT/BURSA   2. Chronic hip pain, bilateral  M25.551 CRP, inflammation    M25.552 ESR: Erythrocyte sedimentation rate    G89.29 XR Lumbar Spine 2/3 Views     XR Pelvis and Hip Bilateral 2 Views   3. Greater trochanteric bursitis of right hip  M70.61 HYDROcodone-acetaminophen (NORCO) 5-325 MG tablet   4. Migraine without aura and without status migrainosus, not intractable  G43.009 metoclopramide (REGLAN) 10 MG tablet     HYDROcodone-acetaminophen (NORCO) 5-325 MG tablet   5. Chronic  bilateral low back pain with bilateral sciatica  M54.42     M54.41     G89.29       I advised Azalia that I do not think her greater trochanteric bursitis is the main cause of her pain.  I advised her that an injection today will likely only treat part of her pain syndrome.  She likely would benefit more from physical therapy with stretching and flexibility exercises as well as strengthening.  She states she does not have time for physical therapy at this time because she has to take care of her children.  I recommended at least a couple visits where she could learn an exercise program that she can do on her own at home.    We discussed the different types of arthritis and I recommended she have some work-up for this.  It is possible she has some structural abnormality in the spine or hips.  She is young to have osteoarthritis significantly but could have some type of autoimmune disorder.  I wanted to check her for inflammatory markers today along with x-ray of the lumbar spine and hips.  We will notify her with results.  I offered her referral to neurology for EMG of the arms but she declines right now because she does not have the time.  She will notify me if she wants to proceed with that.    I did agree to refill her a limited quantity of Norco.  I recommend she try something else for her headaches.  She is going to try Reglan 10 mg as needed pain.     Tobacco Cessation:   reports that she has been smoking. She has never used smokeless tobacco.         No follow-ups on file.    Rosario Casillas MD  [facility]

## 2020-12-30 ENCOUNTER — MYC MEDICAL ADVICE (OUTPATIENT)
Dept: FAMILY MEDICINE | Facility: CLINIC | Age: 30
End: 2020-12-30

## 2020-12-30 PROBLEM — M25.552 CHRONIC HIP PAIN, BILATERAL: Status: ACTIVE | Noted: 2020-12-30

## 2020-12-30 PROBLEM — M54.41 CHRONIC BILATERAL LOW BACK PAIN WITH BILATERAL SCIATICA: Status: ACTIVE | Noted: 2020-12-02

## 2020-12-30 PROBLEM — G89.29 CHRONIC HIP PAIN, BILATERAL: Status: ACTIVE | Noted: 2020-12-30

## 2020-12-30 PROBLEM — M25.551 CHRONIC HIP PAIN, BILATERAL: Status: ACTIVE | Noted: 2020-12-30

## 2020-12-30 PROBLEM — M54.42 CHRONIC BILATERAL LOW BACK PAIN WITH BILATERAL SCIATICA: Status: ACTIVE | Noted: 2020-12-02

## 2020-12-30 NOTE — RESULT ENCOUNTER NOTE
Omar, your tests for inflammation are normal.  I still recommend physical therapy for you.  If you are willing, let me know.  Alternatively you could try a chiropractor.  Rosario Casillas MD

## 2020-12-30 NOTE — RESULT ENCOUNTER NOTE
Brittainy, your x-ray of your low back and hips looks pretty good.  You have very mild degenerative changes at one level in your spine but nothing significant.  You do have a mild scoliosis curve in your lower spine as well.  I do not know if you knew this before or not.  This could definitely account for some of your pain.  Rosario Casillas MD

## 2021-01-03 ENCOUNTER — HEALTH MAINTENANCE LETTER (OUTPATIENT)
Age: 31
End: 2021-01-03

## 2021-02-09 ENCOUNTER — MYC REFILL (OUTPATIENT)
Dept: FAMILY MEDICINE | Facility: CLINIC | Age: 31
End: 2021-02-09

## 2021-02-09 DIAGNOSIS — M70.61 GREATER TROCHANTERIC BURSITIS OF RIGHT HIP: ICD-10-CM

## 2021-02-09 DIAGNOSIS — G43.009 MIGRAINE WITHOUT AURA AND WITHOUT STATUS MIGRAINOSUS, NOT INTRACTABLE: ICD-10-CM

## 2021-02-10 NOTE — TELEPHONE ENCOUNTER
Routing refill request to provider for review/approval because:  Drug not on the [facility] refill protocol     Requested Prescriptions   Pending Prescriptions Disp Refills     HYDROcodone-acetaminophen (NORCO) 5-325 MG tablet 10 tablet 0     Sig: Take 1-2 tablets by mouth daily as needed for severe pain (severe headache)       There is no refill protocol information for this order      Last Written Prescription Date:  12/29/2020  Last Fill Quantity: 10,  # refills: 0   Last office visit: 12/29/2020 with prescribing provider:     Future Office Visit:    Greater trochanteric bursitis of right hip [M70.61]       Migraine without aura and without status migrainosus, not intractable [G43.009]         Jennifer Rutherford, RN

## 2021-02-11 ENCOUNTER — MYC MEDICAL ADVICE (OUTPATIENT)
Dept: FAMILY MEDICINE | Facility: CLINIC | Age: 31
End: 2021-02-11

## 2021-02-11 RX ORDER — HYDROCODONE BITARTRATE AND ACETAMINOPHEN 5; 325 MG/1; MG/1
1-2 TABLET ORAL DAILY PRN
Qty: 10 TABLET | Refills: 0 | Status: SHIPPED | OUTPATIENT
Start: 2021-02-11 | End: 2021-03-16

## 2021-02-11 NOTE — TELEPHONE ENCOUNTER
Evisit info sent.  Home care info also sent.  Closing this encounter.  Irish Pearl, DASHAN, RN

## 2021-03-16 ENCOUNTER — MYC REFILL (OUTPATIENT)
Dept: FAMILY MEDICINE | Facility: CLINIC | Age: 31
End: 2021-03-16

## 2021-03-16 DIAGNOSIS — M70.61 GREATER TROCHANTERIC BURSITIS OF RIGHT HIP: ICD-10-CM

## 2021-03-16 DIAGNOSIS — G43.009 MIGRAINE WITHOUT AURA AND WITHOUT STATUS MIGRAINOSUS, NOT INTRACTABLE: ICD-10-CM

## 2021-03-16 RX ORDER — HYDROCODONE BITARTRATE AND ACETAMINOPHEN 5; 325 MG/1; MG/1
1-2 TABLET ORAL DAILY PRN
Qty: 10 TABLET | Refills: 0 | Status: SHIPPED | OUTPATIENT
Start: 2021-03-16 | End: 2021-04-19

## 2021-03-16 NOTE — TELEPHONE ENCOUNTER
Requested Prescriptions   Pending Prescriptions Disp Refills     HYDROcodone-acetaminophen (NORCO) 5-325 MG tablet 10 tablet 0     Sig: Take 1-2 tablets by mouth daily as needed for severe pain (severe headache)     Last Written Prescription Date:  02/11/2021  Last Fill Quantity: 10,   # refills: 0  Last Office Visit: 12/29/2020  Future Office visit:       Routing refill request to provider for review/approval because:  Drug not on the [facility], [facility] or [facility] refill protocol or controlled substance    Nicole Rosenthal MA

## 2021-03-24 ENCOUNTER — MYC MEDICAL ADVICE (OUTPATIENT)
Dept: FAMILY MEDICINE | Facility: CLINIC | Age: 31
End: 2021-03-24

## 2021-03-24 NOTE — TELEPHONE ENCOUNTER
I recommend no sooner than 4 months from the last injection in either hip. So end of April would be safest.   Rosario Casillas MD

## 2021-03-24 NOTE — TELEPHONE ENCOUNTER
Spoke with patient and informed of note below. She is Wondering if this is for both hips at once or separate. Patient would like to do both at once if possible?     Nicole Rosenthal MA

## 2021-03-24 NOTE — TELEPHONE ENCOUNTER
Patient is wondering when she can have another injection in her hips.  She has the first trochanteric bursa injection was in her right hip on 12/1/2020.  The second injection was on 12/29/2020 in her left hip.  Routing to PCP for further advice.    Irish Pearl RN

## 2021-04-19 ENCOUNTER — MYC REFILL (OUTPATIENT)
Dept: FAMILY MEDICINE | Facility: CLINIC | Age: 31
End: 2021-04-19

## 2021-04-19 DIAGNOSIS — G43.009 MIGRAINE WITHOUT AURA AND WITHOUT STATUS MIGRAINOSUS, NOT INTRACTABLE: ICD-10-CM

## 2021-04-19 DIAGNOSIS — M70.61 GREATER TROCHANTERIC BURSITIS OF RIGHT HIP: ICD-10-CM

## 2021-04-19 NOTE — TELEPHONE ENCOUNTER
Requested Prescriptions   Pending Prescriptions Disp Refills     HYDROcodone-acetaminophen (NORCO) 5-325 MG tablet 10 tablet 0     Sig: Take 1-2 tablets by mouth daily as needed for severe pain (severe headache)     Last Written Prescription Date:  03/16/2021  Last Fill Quantity: 10,   # refills: 0  Last Office Visit: 12/29/2020  Future Office visit:    Next 5 appointments (look out 90 days)    Apr 28, 2021 11:45 AM  MyChart Short with Rosario Casillas MD  [facility] ([facility] ) [address] [place] [address] [id number] [phone]           Routing refill request to provider for review/approval because:  Drug not on the [facility], [facility] or [facility] refill protocol or controlled substance    Nicole Rosenthal MA

## 2021-04-21 RX ORDER — HYDROCODONE BITARTRATE AND ACETAMINOPHEN 5; 325 MG/1; MG/1
1-2 TABLET ORAL DAILY PRN
Qty: 10 TABLET | Refills: 0 | Status: SHIPPED | OUTPATIENT
Start: 2021-04-21 | End: 2021-07-07

## 2021-04-28 ENCOUNTER — OFFICE VISIT (OUTPATIENT)
Dept: FAMILY MEDICINE | Facility: CLINIC | Age: 31
End: 2021-04-28
Payer: COMMERCIAL

## 2021-04-28 VITALS
OXYGEN SATURATION: 98 % | SYSTOLIC BLOOD PRESSURE: 122 MMHG | DIASTOLIC BLOOD PRESSURE: 60 MMHG | RESPIRATION RATE: 14 BRPM | WEIGHT: 154.8 LBS | HEART RATE: 132 BPM | BODY MASS INDEX: 24.25 KG/M2 | TEMPERATURE: 98.4 F

## 2021-04-28 DIAGNOSIS — M70.61 GREATER TROCHANTERIC BURSITIS OF BOTH HIPS: Primary | ICD-10-CM

## 2021-04-28 DIAGNOSIS — M70.62 GREATER TROCHANTERIC BURSITIS OF BOTH HIPS: Primary | ICD-10-CM

## 2021-04-28 DIAGNOSIS — G43.009 MIGRAINE WITHOUT AURA AND WITHOUT STATUS MIGRAINOSUS, NOT INTRACTABLE: ICD-10-CM

## 2021-04-28 PROCEDURE — 20610 DRAIN/INJ JOINT/BURSA W/O US: CPT | Mod: 50 | Performed by: FAMILY MEDICINE

## 2021-04-28 PROCEDURE — 99213 OFFICE O/P EST LOW 20 MIN: CPT | Mod: 25 | Performed by: FAMILY MEDICINE

## 2021-04-28 RX ORDER — SUMATRIPTAN 100 MG/1
100 TABLET, FILM COATED ORAL
Qty: 9 TABLET | Refills: 3 | Status: ON HOLD | OUTPATIENT
Start: 2021-04-28 | End: 2022-07-28

## 2021-04-28 NOTE — PROGRESS NOTES
Assessment & Plan       ICD-10-CM    1. Greater trochanteric bursitis of both hips  M70.61 DRAIN/INJECT LARGE JOINT/BURSA    M70.62    2. Migraine without aura and without status migrainosus, not intractable  G43.009 SUMAtriptan (IMITREX) 100 MG tablet      We discussed the pros and cons of steroid injection, with risks including but not limited to infection, idiosyncratic reaction with increased inflammation or tissue destruction, allergy reaction, failure to provide pain relief, pain from injection.   She wished to proceed.     For each greater trochanteric bursa, I followed this procedure, first for the left then the right:  After informed consent, I marked the skin in a triangular fashion to identify the area of greatest tenderness, which was immediately over the greater trochanters. I prepped the skin with Betadine x3.  I injected a mixture of 3 cc of 1% plain lidocaine and 1 cc of Depo-Medrol 40 with a 25-gauge 1 inch needle.  Band-Aid with bacitracin was applied.  She tolerated this well without complication.    We discussed that PT is going to be more beneficial in the long run and I strongly recommend she start doing this now to minimize need for further injection. Discussed that these are only short term and cannot be repeated too many times. Continue icing, stretching, chiro.    I did refill her Imitrex.     15 minutes spent on the date of the encounter doing chart review, history and exam, documentation and further activities per the note     Tobacco Cessation:   reports that she has been smoking. She has never used smokeless tobacco.  We did not address her smoking today. I recommend she quit completely but will address at future visits.     No follow-ups on file.    Rosario Casillas MD  [facility]    Isidro Nelson is a 30 year old who presents for the following health issues  accompanied by her 3 children.    HPI     Patient is in to have bilateral greater  trochanteric bursa injection.  I saw her in December 2020 (first for the right on 12/1, then the left on 12/29) for bursa injection and now she would like to have them both repeated.  She got partial relief from both injections, enough to make it worthwhile to repeat if possible. It did not take away her pain completely but help diminish it.  She also needs a refill of her imitrex for headaches.   She sees chiropractor but it doesn't help completely. She uses ice when she can. She has exercises that she does but is not doing PT, hasn't had the time.     Review of Systems   Constitutional, HEENT, cardiovascular, pulmonary, gi and gu systems are negative, except as otherwise noted.      Objective    /60   Pulse 132   Temp 98.4  F (36.9  C) (Temporal)   Resp 14   Wt 70.2 kg (154 lb 12.8 oz)   LMP 04/15/2021 (Exact Date)   SpO2 98%   BMI 24.25 kg/m    Body mass index is 24.25 kg/m .  Physical Exam   Vitals noted.  Patient alert, oriented, and in no acute distress.  She is tender with palpation over the bilateral greater trochanters of the hips.  There is no overlying skin change, no erythema, swelling or bruising.  No deformity.

## 2021-06-02 ENCOUNTER — RECORDS - HEALTHEAST (OUTPATIENT)
Dept: ADMINISTRATIVE | Facility: CLINIC | Age: 31
End: 2021-06-02

## 2021-07-07 ENCOUNTER — MYC REFILL (OUTPATIENT)
Dept: FAMILY MEDICINE | Facility: CLINIC | Age: 31
End: 2021-07-07

## 2021-07-07 DIAGNOSIS — G43.009 MIGRAINE WITHOUT AURA AND WITHOUT STATUS MIGRAINOSUS, NOT INTRACTABLE: ICD-10-CM

## 2021-07-07 DIAGNOSIS — M70.61 GREATER TROCHANTERIC BURSITIS OF RIGHT HIP: ICD-10-CM

## 2021-07-07 RX ORDER — HYDROCODONE BITARTRATE AND ACETAMINOPHEN 5; 325 MG/1; MG/1
1-2 TABLET ORAL DAILY PRN
Qty: 10 TABLET | Refills: 0 | Status: SHIPPED | OUTPATIENT
Start: 2021-07-07 | End: 2021-10-05

## 2021-07-07 NOTE — TELEPHONE ENCOUNTER
Requested Prescriptions   Pending Prescriptions Disp Refills     HYDROcodone-acetaminophen (NORCO) 5-325 MG tablet 10 tablet 0     Sig: Take 1-2 tablets by mouth daily as needed for severe pain (severe headache)     Last Written Prescription Date:  04/21/2021  Last Fill Quantity: 10,   # refills:   Last Office Visit: 04/28/2021  Future Office visit:       Routing refill request to provider for review/approval because:  Drug not on the [facility], [facility] or [facility] refill protocol or controlled substance

## 2021-07-19 ENCOUNTER — MYC MEDICAL ADVICE (OUTPATIENT)
Dept: FAMILY MEDICINE | Facility: CLINIC | Age: 31
End: 2021-07-19

## 2021-07-19 NOTE — TELEPHONE ENCOUNTER
Scheduled visit, patient response to My Chart.  Will close encounter at this time.    Jennifer Rutherford RN

## 2021-07-19 NOTE — TELEPHONE ENCOUNTER
Patient is given low back pain information and exercises.  She will let us know if she has any of the symptoms listed.  Will leave open in case she has additional questions.  DASHA MartinN, RN

## 2021-07-19 NOTE — TELEPHONE ENCOUNTER
Patient asked additional questions via [facility].  Will wait for a response.  DASHA MartinN, RN

## 2021-07-20 ENCOUNTER — OFFICE VISIT (OUTPATIENT)
Dept: FAMILY MEDICINE | Facility: CLINIC | Age: 31
End: 2021-07-20
Payer: COMMERCIAL

## 2021-07-20 VITALS
HEIGHT: 65 IN | SYSTOLIC BLOOD PRESSURE: 144 MMHG | OXYGEN SATURATION: 98 % | RESPIRATION RATE: 16 BRPM | WEIGHT: 148 LBS | TEMPERATURE: 98.4 F | DIASTOLIC BLOOD PRESSURE: 94 MMHG | HEART RATE: 110 BPM | BODY MASS INDEX: 24.66 KG/M2

## 2021-07-20 DIAGNOSIS — M54.50 ACUTE BILATERAL LOW BACK PAIN WITHOUT SCIATICA: Primary | ICD-10-CM

## 2021-07-20 PROCEDURE — 99213 OFFICE O/P EST LOW 20 MIN: CPT | Performed by: PHYSICIAN ASSISTANT

## 2021-07-20 RX ORDER — PREDNISONE 20 MG/1
40 TABLET ORAL DAILY
Qty: 10 TABLET | Refills: 0 | Status: SHIPPED | OUTPATIENT
Start: 2021-07-20 | End: 2021-07-25

## 2021-07-20 ASSESSMENT — MIFFLIN-ST. JEOR: SCORE: 1392.2

## 2021-07-20 ASSESSMENT — PAIN SCALES - GENERAL: PAINLEVEL: EXTREME PAIN (9)

## 2021-07-20 NOTE — PATIENT INSTRUCTIONS
When You Have Low Back Pain    Caring for Your Back  You are not alone.    Low back pain is very common. Nearly half of all adults have low back pain in any given year. The good news is that back pain is rarely a danger to your health. Most people can manage their back pain on their own and about half of them start feeling better within 2 weeks. In 9 out of 10 cases, low back pain goes away or no longer limits daily activity within 6 weeks.     Your outlook is good!    Your symptoms tell us that your low back pain is most likely not a danger to you. Most of the time we do not know the exact cause of low back pain, even if you see a doctor or have an MRI. However, treatment can still work without knowing the cause of the pain. Less than 1 in 100 people need surgery for their back pain.     What can I do about my low back pain?     There are three things you can do to ease low back pain and help it go away.    Use heat or cold packs.    Take medicine as directed.    Use positions, movements and exercises.     Using heat or cold packs    Try cold packs or gentle heat to ease your pain. Use whichever gives you the most relief. Apply the cold pack or heat for 15 minutes at a time, as often as needed.    Taking medicine      If your doctor has prescribed medicine, be sure to follow the directions.    If you take over-the-counter medicine, read and follow the directions.    Talk to your doctor if you have any questions.     Using positions, movements and exercises    Research tells us that moving your joints and muscles can help you recover from back pain. Such activity should be simple and gentle. Use the positions in the photos as well as walking to help relieve your pain. Try taking a short walk every 3 to 4 hours during the day. Walk for a few minutes inside your home or take longer walks outside, on a treadmill or at a mall. Slowly increase the amount of time you walk. Expect discomfort when you begin, but it should  lessen as your back starts to heal. When your back feels better, walk daily to keep your back and body healthy.    Finding a comfortable position    When your back pain is new, certain positions will ease your pain. Gently try each of the positions below until you find one that is helpful. Once you find a position of comfort, use it as often as you like when you are resting. You will recover faster if you combine rest with activity.         Lie on your back with your legs bent. You can do this by placing a pillow under your knees. Or you may lie on the floor and rest your lower legs on the seat of a chair.       Lie on your side with your knees bent, and place a pillow between your knees.       Lie on your stomach over pillows.      When should I call my doctor?    Your back pain should improve over the first couple of weeks. As it improves, you should be able to return to your normal activities. But call your doctor if:    You have a sudden change in your ability to control your bladder or bowels.    You feel tingling in your groin or legs.    The pain spreads down your leg and into your foot.    Your toes, feet or leg muscles feel weak.    You feel generally unwell or sick.    Your pain does not get better or gets worse.      If you are deaf or hard of hearing, please let us know. We provide many free services including sign language interpreters,oral interpreters, TTYs, telephone amplifiers, note takers and written materials.    For informational purposes only. Not to replace the advice of your health care provider. Copyright   2013 [facility]. All rights reserved. [employer] [id number] - Rev 06/14.    Take Tylenol or an NSAID such as ibuprofen or naproxen as needed for pain.  May take up to 1000 mg of Tylenol every 6-8 hours- do not exceed 4000 mg in 24 hours.  May take up to 800 mg ibuprofen every 6-8 hours.  Alternate Tylenol and Ibuprofen every 3 hours.

## 2021-07-20 NOTE — PROGRESS NOTES
Assessment & Plan     Acute bilateral low back pain without sciatica  - predniSONE (DELTASONE) 20 MG tablet; Take 2 tablets (40 mg) by mouth daily for 5 days  Discussed treatment with prednisone to help reduce inflammation.  Symptomatic treatment with ice, heat, stretch, massage.  Tylenol and ibuprofen alternated every 3-4 hours as needed.  See spine specialist if symptoms have not improved in 6 weeks.    20 minutes spent on the date of the encounter doing chart review, patient visit and documentation       Return in about 5 weeks (around 8/24/2021) for consult with spine specalist for further evaluation if pain is not improved.    LEO Contreras [facility]    Isidro Neslon is a 30 year old who presents for the following health issues     HPI     Back Pain  Onset/Duration: 4 days  Description:   Location of pain: low back - middle  Character of pain: sharp  Pain radiation: radiates into tailbone  New numbness or weakness in legs, not attributed to pain: YES- if she bends over then she feels like she is going to fall over  Intensity: severe  Progression of Symptoms: same  History:   Specific cause: lifting  Pain interferes with job: not applicable  History of back problems: previous degenerative joint disease of the lumbar spine  Any previous MRI or X-rays: Yes- at [place].  Date 12/29/2020  Sees a specialist for back pain: No  Alleviating factors:   Improved by: cold, heat and opioids    Precipitating factors:  Worsened by: Lifting, Bending, Standing, Sitting, Lying Flat and Walking  Therapies tried and outcome: cold, heat and opioids    Accompanying Signs & Symptoms:  Risk of Fracture: None  Risk of Cauda Equina: None  Risk of Infection: None  Risk of Cancer: None  Risk of Ankylosing Spondylitis: Onset at age <35, male, AND morning back stiffness no    Omar presents to clinic today for evaluation of acute lower back pain.  She states that her mastiff dog has a  "broken femur.  The dog needs help going up and down the stairs so he can go to the bathroom.  On Saturday morning, 3 days ago, she was bending over to help her dog go down the stairs and she felt a pop when she lifted his back legs.  Her back felt like it was stuck and she was unable to move.  She states she feels the best when she is in partial flexion in sitting.  She has pain when standing up straight, arching her back and with movement.  She also has pain with too much flexion and notes a shooting or stabbing pain in the back.  Pain is in her lumbar and sacral spine.    Review of Systems   ROS negative except as stated above.      Objective    BP (!) 144/94   Pulse 110   Temp 98.4  F (36.9  C) (Temporal)   Resp 16   Ht 1.651 m (5' 5\")   Wt 67.1 kg (148 lb)   LMP 06/25/2021   SpO2 98%   Breastfeeding Yes   BMI 24.63 kg/m    Body mass index is 24.63 kg/m .  Physical Exam   GENERAL: healthy, alert and no distress  EYES: Eyes grossly normal to inspection, PERRL and conjunctivae and sclerae normal  NECK: no adenopathy, no asymmetry, masses, or scars and thyroid normal to palpation  RESP: lungs clear to auscultation - no rales, rhonchi or wheezes  CV: regular rate and rhythm, normal S1 S2, no S3 or S4, no murmur, click or rub, no peripheral edema and peripheral pulses strong  MS: no gross musculoskeletal defects noted, no edema  SKIN: no suspicious lesions or rashes  NEURO: Normal strength and tone, mentation intact and speech normal  BACK: no CVA tenderness, no paralumbar tenderness.  Pain with palpation over lumbar and sacral spine.  Range of motion is limited secondary to pain.  Pain is most significant with extension and extreme flexion.  Strength 5 out of 5 bilaterally.  Sensation grossly intact.  Straight leg raise bilaterally is very painful, negative at about 15 degrees bilaterally.    No results found for any visits on 07/20/21.            "

## 2021-07-20 NOTE — NURSING NOTE
Health Maintenance Due   Topic Date Due     ANNUAL REVIEW OF HM ORDERS  Never done     ADVANCE CARE PLANNING  Never done     Pneumococcal Vaccine: Pediatrics (0 to 5 Years) and At-Risk Patients (6 to 64 Years) (1 of 2 - PPSV23) Never done     COVID-19 Vaccine (1) Never done     HEPATITIS B IMMUNIZATION (3 of 3 - 3-dose primary series) 06/24/2004     HEPATITIS C SCREENING  Never done     URINE DRUG SCREEN  06/07/2021     PREVENTIVE CARE VISIT  07/27/2021     Jim Renteria, [facility]

## 2021-08-10 ENCOUNTER — MYC MEDICAL ADVICE (OUTPATIENT)
Dept: FAMILY MEDICINE | Facility: CLINIC | Age: 31
End: 2021-08-10

## 2021-08-10 ENCOUNTER — NURSE TRIAGE (OUTPATIENT)
Dept: FAMILY MEDICINE | Facility: CLINIC | Age: 31
End: 2021-08-10

## 2021-08-10 DIAGNOSIS — O20.9 VAGINAL BLEEDING IN PREGNANCY, FIRST TRIMESTER: Primary | ICD-10-CM

## 2021-08-10 NOTE — TELEPHONE ENCOUNTER
Positive pregnancy test 7/27/2021    Last LMP 6- = 6 weeks 4 days    Brown/ dark red this morning this morning.  When she wipes- a little spotting.  No abdominal pain.   Patient is RH negative and would like to be seen.    Please advise.    Mariana Mcclendon RN on 8/10/2021 at 8:19 AM      Reason for Disposition    Patient wants to be seen    Additional Information    Negative: Shock suspected (e.g., cold/pale/clammy skin, too weak to stand, low BP, rapid pulse)    Negative: Difficult to awaken or acting confused (e.g., disoriented, slurred speech)    Negative: Passed out (i.e., fainted, collapsed and was not responding)    Negative: Sounds like a life-threatening emergency to the triager    Negative: Vaginal bleeding and pregnant > 20 weeks    Negative: Not pregnant or pregnancy status unknown    Negative: SEVERE abdominal pain (e.g., excruciating)    Negative: SEVERE vaginal bleeding (e.g., soaking 2 pads / hour, large blood clots) and present for 2 or more hours    Negative: SEVERE dizziness (e.g., unable to stand, requires support to walk, feels like passing out)    Negative: MODERATE vaginal bleeding (e.g., soaking 1 pad) and present > 6 hours    Negative: MODERATE vaginal bleeding (e.g., soaking 1 pad / hour, clots) and pregnant > 12 weeks    Negative: Passed tissue (e.g., gray-white)    Negative: Shoulder pain    Negative: Constant abdominal pain lasting > 1 hour    Negative: Fever > 100.4 F (38.0 C)    Negative: Pale skin (pallor) of new onset or worsening    Negative: Patient sounds very sick or weak to the triager    Negative: MODERATE vaginal bleeding (e.g., soaking 1 pad / hour; clots)    Negative: Intermittent lower abdominal pain (e.g., cramping) lasting > 24 hours    Negative: Pain or burning with passing urine (urination)    Negative: Prior history of 'ectopic pregnancy' or previous tubal surgery (e.g., tubal ligation)    Protocols used: PREGNANCY - VAGINAL BLEEDING LESS THAN 20 WEEKS  [place]

## 2021-08-10 NOTE — TELEPHONE ENCOUNTER
See mychart note to patient. please help her get in for lab and ultrasound.  Rosario Casillas MD

## 2021-10-05 ENCOUNTER — MYC REFILL (OUTPATIENT)
Dept: FAMILY MEDICINE | Facility: CLINIC | Age: 31
End: 2021-10-05

## 2021-10-05 DIAGNOSIS — M70.61 GREATER TROCHANTERIC BURSITIS OF RIGHT HIP: ICD-10-CM

## 2021-10-05 DIAGNOSIS — G43.009 MIGRAINE WITHOUT AURA AND WITHOUT STATUS MIGRAINOSUS, NOT INTRACTABLE: ICD-10-CM

## 2021-10-06 NOTE — TELEPHONE ENCOUNTER
Pending Prescriptions:                       Disp   Refills    HYDROcodone-acetaminophen (NORCO) 5-325 MG*10 tab*0        Sig: Take 1-2 tablets by mouth daily as needed for severe           pain (severe headache)    Routing refill request to provider for review/approval because:  Drug not on the [facility] refill protocol

## 2021-10-10 ENCOUNTER — HEALTH MAINTENANCE LETTER (OUTPATIENT)
Age: 31
End: 2021-10-10

## 2021-10-11 RX ORDER — HYDROCODONE BITARTRATE AND ACETAMINOPHEN 5; 325 MG/1; MG/1
1-2 TABLET ORAL DAILY PRN
Qty: 10 TABLET | Refills: 0 | Status: SHIPPED | OUTPATIENT
Start: 2021-10-11 | End: 2021-12-09

## 2021-12-09 ENCOUNTER — VIRTUAL VISIT (OUTPATIENT)
Dept: OBGYN | Facility: OTHER | Age: 31
End: 2021-12-09
Payer: COMMERCIAL

## 2021-12-09 ENCOUNTER — MYC REFILL (OUTPATIENT)
Dept: FAMILY MEDICINE | Facility: CLINIC | Age: 31
End: 2021-12-09

## 2021-12-09 DIAGNOSIS — G43.009 MIGRAINE WITHOUT AURA AND WITHOUT STATUS MIGRAINOSUS, NOT INTRACTABLE: ICD-10-CM

## 2021-12-09 DIAGNOSIS — N92.6 IRREGULAR MENSTRUAL CYCLE: Primary | ICD-10-CM

## 2021-12-09 DIAGNOSIS — M70.61 GREATER TROCHANTERIC BURSITIS OF RIGHT HIP: ICD-10-CM

## 2021-12-09 PROCEDURE — 99202 OFFICE O/P NEW SF 15 MIN: CPT | Mod: 95 | Performed by: ADVANCED PRACTICE MIDWIFE

## 2021-12-10 RX ORDER — HYDROCODONE BITARTRATE AND ACETAMINOPHEN 5; 325 MG/1; MG/1
1-2 TABLET ORAL DAILY PRN
Qty: 10 TABLET | Refills: 0 | Status: SHIPPED | OUTPATIENT
Start: 2021-12-10 | End: 2022-01-25

## 2022-01-19 ENCOUNTER — MYC MEDICAL ADVICE (OUTPATIENT)
Dept: FAMILY MEDICINE | Facility: CLINIC | Age: 32
End: 2022-01-19
Payer: COMMERCIAL

## 2022-01-19 ENCOUNTER — TELEPHONE (OUTPATIENT)
Dept: FAMILY MEDICINE | Facility: CLINIC | Age: 32
End: 2022-01-19
Payer: COMMERCIAL

## 2022-01-19 DIAGNOSIS — N93.9 VAGINAL BLEEDING: Primary | ICD-10-CM

## 2022-01-19 NOTE — TELEPHONE ENCOUNTER
Patient is calling to schedule an office visit with a provider for tomorrow per a midwife recommendation at the [facility] due to spontaneous  confirmed yesterday at their health facility.  Offered patient open office visit at [facility] tomorrow morning.  Patient stated that was about as far as her [facility] and she would rather call the [facility] to schedule visit.  Advised patient to call back with any further questions or concerns.  Patient stated understanding.    Jennifer Rutherford RN

## 2022-01-19 NOTE — TELEPHONE ENCOUNTER
I put in a standing beta-hCG level.  Could you please RN triage to see how she is feeling, thanks.  I do not see a note from where she was seen.  We should follow this level down to 0

## 2022-01-20 ENCOUNTER — VIRTUAL VISIT (OUTPATIENT)
Dept: FAMILY MEDICINE | Facility: CLINIC | Age: 32
End: 2022-01-20
Payer: COMMERCIAL

## 2022-01-20 DIAGNOSIS — O03.9 MISCARRIAGE: Primary | ICD-10-CM

## 2022-01-20 PROCEDURE — 99213 OFFICE O/P EST LOW 20 MIN: CPT | Mod: 95 | Performed by: FAMILY MEDICINE

## 2022-01-20 NOTE — TELEPHONE ENCOUNTER
Patient reports that she was seen on Tuesday with [facility].  They did a ultrasound and found baby to have no heart beat and stopped growing at 5w5d.  Patient states that she did start passing clots and having some cramping yesterday.  Patient is RH- and will need rhogam shot.      Patient was scheduled at 2:40 today for virtual visit with Dr. Olivier.     Pina Ratliff RN

## 2022-01-20 NOTE — PROGRESS NOTES
Omar is a 31 year old who is being evaluated via a billable telephone visit.      What phone number would you like to be contacted at? [phone]  How would you like to obtain your AVS? MyChart    Assessment & Plan       ICD-10-CM    1. Miscarriage  O03.9       Currently having a miscarriage.  Bleeding is reasonable at this point.  Gave her advice on what to watch out for.  Continue with ibuprofen for cramping.  Set her up with recurrent weekly hCGs until back to 0.  Call if any complications occur and she agrees.  She may resume attempts to get pregnant as soon as she feels emotionally and physically ready.  A third miscarriage would probably more in depth investigation           Tobacco Cessation:   reports that she has been smoking cigarettes. She has never used smokeless tobacco.          No follow-ups on file.    Davide Olivier MD  [facility]   Omar is a 31 year old who presents for the following health issues     HPI     Miscarriage       US confirmed preg last tues at 5w, but she was 8w by LMP  Has had mod bleeding last 2 days, 1/2 pad per hr at worst  Feels ok  This is second in a year  4 prior term         Review of Systems   Constitutional, HEENT, cardiovascular, pulmonary, gi and gu systems are negative, except as otherwise noted.      Objective           Vitals:  No vitals were obtained today due to virtual visit.    Physical Exam     PSYCH: Alert and oriented times 3; coherent speech, normal   rate and volume, able to articulate logical thoughts, able   to abstract reason, no tangential thoughts, no hallucinations   or delusions  Her affect is   RESP: No cough, no audible wheezing, able to talk in full sentences  Remainder of exam unable to be completed due to telephone visits                Phone call duration: 21 minutes

## 2022-01-21 ENCOUNTER — MYC MEDICAL ADVICE (OUTPATIENT)
Dept: FAMILY MEDICINE | Facility: CLINIC | Age: 32
End: 2022-01-21
Payer: COMMERCIAL

## 2022-01-21 DIAGNOSIS — G43.009 MIGRAINE WITHOUT AURA AND WITHOUT STATUS MIGRAINOSUS, NOT INTRACTABLE: ICD-10-CM

## 2022-01-21 DIAGNOSIS — M70.61 GREATER TROCHANTERIC BURSITIS OF RIGHT HIP: ICD-10-CM

## 2022-01-21 NOTE — TELEPHONE ENCOUNTER
"Patient reports she has a bad migraine for the past 3 weeks and usually takes Norco PRN for this. She states she has a history of headaches and this one seems the same as previous ones she has experienced. She also states she is passing clots and having cramping that feels like\"uterine cramps,\" like she has experienced in the past with her other children. She would like something for her headache, Tylenol and Motrin are not helping.      Tabitha Sousa RN    "

## 2022-01-22 DIAGNOSIS — M70.61 GREATER TROCHANTERIC BURSITIS OF RIGHT HIP: ICD-10-CM

## 2022-01-22 DIAGNOSIS — G43.009 MIGRAINE WITHOUT AURA AND WITHOUT STATUS MIGRAINOSUS, NOT INTRACTABLE: ICD-10-CM

## 2022-01-25 RX ORDER — HYDROCODONE BITARTRATE AND ACETAMINOPHEN 5; 325 MG/1; MG/1
1-2 TABLET ORAL DAILY PRN
Qty: 10 TABLET | Refills: 0 | Status: SHIPPED | OUTPATIENT
Start: 2022-01-25 | End: 2022-03-17

## 2022-01-25 RX ORDER — HYDROCODONE BITARTRATE AND ACETAMINOPHEN 5; 325 MG/1; MG/1
1-2 TABLET ORAL DAILY PRN
Qty: 10 TABLET | Refills: 0 | OUTPATIENT
Start: 2022-01-25

## 2022-01-26 ENCOUNTER — LAB (OUTPATIENT)
Dept: LAB | Facility: CLINIC | Age: 32
End: 2022-01-26
Payer: COMMERCIAL

## 2022-01-26 DIAGNOSIS — N93.9 VAGINAL BLEEDING: ICD-10-CM

## 2022-01-26 LAB — B-HCG SERPL-ACNC: 313 IU/L (ref 0–5)

## 2022-01-26 PROCEDURE — 84702 CHORIONIC GONADOTROPIN TEST: CPT

## 2022-01-26 PROCEDURE — 36415 COLL VENOUS BLD VENIPUNCTURE: CPT

## 2022-02-04 ENCOUNTER — LAB (OUTPATIENT)
Dept: LAB | Facility: CLINIC | Age: 32
End: 2022-02-04
Payer: COMMERCIAL

## 2022-02-04 DIAGNOSIS — N93.9 VAGINAL BLEEDING: ICD-10-CM

## 2022-02-04 LAB — B-HCG SERPL-ACNC: 13 IU/L (ref 0–5)

## 2022-02-04 PROCEDURE — 84702 CHORIONIC GONADOTROPIN TEST: CPT

## 2022-02-04 PROCEDURE — 36415 COLL VENOUS BLD VENIPUNCTURE: CPT

## 2022-02-11 ENCOUNTER — LAB (OUTPATIENT)
Dept: LAB | Facility: CLINIC | Age: 32
End: 2022-02-11
Payer: COMMERCIAL

## 2022-02-11 DIAGNOSIS — N93.9 VAGINAL BLEEDING: ICD-10-CM

## 2022-02-11 LAB — B-HCG SERPL-ACNC: 3 IU/L (ref 0–5)

## 2022-02-11 PROCEDURE — 36415 COLL VENOUS BLD VENIPUNCTURE: CPT

## 2022-02-11 PROCEDURE — 84702 CHORIONIC GONADOTROPIN TEST: CPT

## 2022-03-07 ENCOUNTER — MYC MEDICAL ADVICE (OUTPATIENT)
Dept: FAMILY MEDICINE | Facility: CLINIC | Age: 32
End: 2022-03-07
Payer: COMMERCIAL

## 2022-03-17 ENCOUNTER — MYC REFILL (OUTPATIENT)
Dept: FAMILY MEDICINE | Facility: CLINIC | Age: 32
End: 2022-03-17
Payer: COMMERCIAL

## 2022-03-17 DIAGNOSIS — G43.009 MIGRAINE WITHOUT AURA AND WITHOUT STATUS MIGRAINOSUS, NOT INTRACTABLE: ICD-10-CM

## 2022-03-17 DIAGNOSIS — M70.61 GREATER TROCHANTERIC BURSITIS OF RIGHT HIP: ICD-10-CM

## 2022-03-18 RX ORDER — HYDROCODONE BITARTRATE AND ACETAMINOPHEN 5; 325 MG/1; MG/1
1-2 TABLET ORAL DAILY PRN
Qty: 10 TABLET | Refills: 0 | Status: SHIPPED | OUTPATIENT
Start: 2022-03-18 | End: 2022-04-12

## 2022-03-18 NOTE — TELEPHONE ENCOUNTER
Requested Prescriptions   Pending Prescriptions Disp Refills     HYDROcodone-acetaminophen (NORCO) 5-325 MG tablet 10 tablet 0     Sig: Take 1-2 tablets by mouth daily as needed for severe pain (severe headache)   Last Written Prescription Date:  01/25/2022  Last Fill Quantity: 10,   # refills: 0  Last Office Visit: 01/20/2022  Future Office visit:    Next 5 appointments (look out 90 days)    Mar 25, 2022  1:20 PM  (Arrive by 1:00 PM)  Provider Visit with Rosario Casillas MD  [facility] ([facility] ) [address] [place] [id number] [phone]           Routing refill request to provider for review/approval because:  Drug not on the [facility], [facility] or [facility] refill protocol or controlled substance

## 2022-03-24 ENCOUNTER — MYC MEDICAL ADVICE (OUTPATIENT)
Dept: FAMILY MEDICINE | Facility: CLINIC | Age: 32
End: 2022-03-24
Payer: COMMERCIAL

## 2022-03-25 ENCOUNTER — OFFICE VISIT (OUTPATIENT)
Dept: FAMILY MEDICINE | Facility: CLINIC | Age: 32
End: 2022-03-25
Payer: COMMERCIAL

## 2022-03-25 VITALS
WEIGHT: 146 LBS | BODY MASS INDEX: 24.3 KG/M2 | DIASTOLIC BLOOD PRESSURE: 80 MMHG | TEMPERATURE: 98.2 F | SYSTOLIC BLOOD PRESSURE: 126 MMHG | HEART RATE: 129 BPM | RESPIRATION RATE: 16 BRPM | OXYGEN SATURATION: 98 %

## 2022-03-25 DIAGNOSIS — M54.41 CHRONIC BILATERAL LOW BACK PAIN WITH BILATERAL SCIATICA: ICD-10-CM

## 2022-03-25 DIAGNOSIS — R10.2 PELVIC PAIN IN FEMALE: ICD-10-CM

## 2022-03-25 DIAGNOSIS — M70.61 GREATER TROCHANTERIC BURSITIS OF BOTH HIPS: Primary | ICD-10-CM

## 2022-03-25 DIAGNOSIS — M54.42 CHRONIC BILATERAL LOW BACK PAIN WITH BILATERAL SCIATICA: ICD-10-CM

## 2022-03-25 DIAGNOSIS — M70.62 GREATER TROCHANTERIC BURSITIS OF BOTH HIPS: Primary | ICD-10-CM

## 2022-03-25 DIAGNOSIS — N92.1 MENORRHAGIA WITH IRREGULAR CYCLE: ICD-10-CM

## 2022-03-25 DIAGNOSIS — G89.29 CHRONIC BILATERAL LOW BACK PAIN WITH BILATERAL SCIATICA: ICD-10-CM

## 2022-03-25 PROCEDURE — 20610 DRAIN/INJ JOINT/BURSA W/O US: CPT | Mod: 50 | Performed by: FAMILY MEDICINE

## 2022-03-25 PROCEDURE — 99213 OFFICE O/P EST LOW 20 MIN: CPT | Mod: 25 | Performed by: FAMILY MEDICINE

## 2022-03-25 ASSESSMENT — PAIN SCALES - GENERAL: PAINLEVEL: SEVERE PAIN (7)

## 2022-03-25 NOTE — PROGRESS NOTES
Assessment & Plan       ICD-10-CM    1. Greater trochanteric bursitis of both hips  M70.61 DRAIN/INJECT LARGE JOINT/BURSA    M70.62    2. Chronic bilateral low back pain with bilateral sciatica  M54.42 MR Lumbar Spine w/o Contrast    M54.41     G89.29    3. Pelvic pain in female  R10.2 Ob/Gyn Referral   4. Menorrhagia with irregular cycle  N92.1 Ob/Gyn Referral        For her bilateral greater trochanteric bursitis, I did agree to inject her again today.   We discussed the pros and cons of steroid injection, with risks including but not limited to infection, idiosyncratic reaction with increased inflammation or tissue destruction, allergy reaction, failure to provide pain relief, pain from injection.   She wished to proceed.      For each greater trochanteric bursa, I followed this procedure, first for the left then the right:  After informed consent, I marked the skin to identify the area of greatest tenderness, which was immediately over the greater trochanters. I prepped the skin with Betadine x3.  I injected a mixture of 2.5 cc of 1% plain lidocaine and 0.5 cc of Depo-Medrol 80 with a 25-gauge 1 inch needle.  Band-Aid with bacitracin was applied.  She tolerated this well without complication.    I recommend an MRI for her lower back to see if that could be contributing to some of her pain.  Discussed degenerative changes in the spine and how back and impact her nerves, will look for nerve impingement.    I did agree to set her up with OB/GYN consultation as well to consider hysterectomy at her request.  She may consider other options such as ablation but that will be up to her the surgeon to discuss.     In addition to her injections, 15 minutes spent on the date of the encounter doing chart review, history and exam, documentation and further activities per the note       Tobacco Cessation:   reports that she has been smoking cigarettes. She has a 5.00 pack-year smoking history. She has never used smokeless  tobacco.    No follow-ups on file.    Rosario Casillas MD  [facility] EUSEBIO Nelson is a 31 year old who presents for the following health issues     Musculoskeletal Problem    History of Present Illness       Reason for visit:  Cortisone inj  Symptom onset:  More than a month  Symptoms include:  Hip pain  Symptom intensity:  Severe  Symptom progression:  Staying the same  Had these symptoms before:  Yes  Has tried/received treatment for these symptoms:  Yes  Previous treatment was successful:  Yes  Prior treatment description:  Cortisone inj  What makes it worse:  Standing sitting laying    She eats 2-3 servings of fruits and vegetables daily.She consumes 1 sweetened beverage(s) daily.She exercises with enough effort to increase her heart rate 10 to 19 minutes per day.  She exercises with enough effort to increase her heart rate 5 days per week.   She is taking medications regularly.     She has had chronic bilateral greater trochanteric bursitis. She has had a couple injections in the past, it has been a while. Last injections were done by me in April 2021.   It gave her good relief for a while.   She was also seen in July for low back pain.  She was treated with a course of prednisone which gave her brief relief.  She still has the back pain.  She has not done physical therapy in the past, just does not have time. She does see a chiropractor.  She has had normal hip x-rays in 2020 and mild degenerative arthritis in the lower spine on x-ray.  She has not had an MRI.  She also suffered a miscarriage at approximately 6 weeks in January.  She has ongoing heavy irregular and painful periods and would like to consider hysterectomy.  She is wondering if I could refer her to a surgeon.  She has pain with intercourse and pain with her periods.  She will not do an IUD. she has tried IUD 3 times in the past.  The first 1 fell out, the second 1 caused her heavy bleeding and the  third 1 she felt abnormal hormonal side effects.  She has tried Mirena and ParaGard.    Review of Systems   Constitutional, HEENT, cardiovascular, pulmonary, gi and gu systems are negative, except as otherwise noted.      Objective    /80   Pulse (!) 129   Temp 98.2  F (36.8  C) (Temporal)   Resp 16   Wt 66.2 kg (146 lb)   SpO2 98%   BMI 24.30 kg/m    Body mass index is 24.3 kg/m .  Physical Exam   Vitals noted.  Patient alert, oriented, and in no acute distress.   She is slow getting up and down due to pain in her back and hips.  She is tender with palpation over the greater trochanters bilaterally.    Orders Placed This Encounter   Procedures     REVIEW OF HEALTH MAINTENANCE PROTOCOL ORDERS     MR Lumbar Spine w/o Contrast     Ob/Gyn Referral

## 2022-04-01 ENCOUNTER — HOSPITAL ENCOUNTER (OUTPATIENT)
Dept: MRI IMAGING | Facility: CLINIC | Age: 32
Discharge: HOME | End: 2022-04-01
Attending: FAMILY MEDICINE | Admitting: FAMILY MEDICINE
Payer: COMMERCIAL

## 2022-04-01 ENCOUNTER — MYC MEDICAL ADVICE (OUTPATIENT)
Dept: FAMILY MEDICINE | Facility: CLINIC | Age: 32
End: 2022-04-01
Payer: COMMERCIAL

## 2022-04-01 DIAGNOSIS — M54.41 CHRONIC BILATERAL LOW BACK PAIN WITH BILATERAL SCIATICA: ICD-10-CM

## 2022-04-01 DIAGNOSIS — G89.29 CHRONIC BILATERAL LOW BACK PAIN WITH BILATERAL SCIATICA: ICD-10-CM

## 2022-04-01 DIAGNOSIS — M54.42 CHRONIC BILATERAL LOW BACK PAIN WITH BILATERAL SCIATICA: ICD-10-CM

## 2022-04-01 PROCEDURE — 72148 MRI LUMBAR SPINE W/O DYE: CPT

## 2022-04-02 NOTE — RESULT ENCOUNTER NOTE
Omar, here is your MRI report.  Overall it looks good, except for at 2 levels you have some mild arthritis changes.  You have some wearing out of the discs between the vertebrae at L4-L5 and at L5-S1.  However no nerves are being pinched or anything damaging your spinal cord.  Rosario Casillas MD

## 2022-04-06 NOTE — TELEPHONE ENCOUNTER
Gave patient information about arthritis/back pain to try options at home. Advised her to follow up if the pain is still continuing to worsen.    DASHA PattenN, RN

## 2022-04-12 ENCOUNTER — MYC REFILL (OUTPATIENT)
Dept: FAMILY MEDICINE | Facility: CLINIC | Age: 32
End: 2022-04-12
Payer: COMMERCIAL

## 2022-04-12 DIAGNOSIS — M70.61 GREATER TROCHANTERIC BURSITIS OF RIGHT HIP: ICD-10-CM

## 2022-04-12 DIAGNOSIS — G43.009 MIGRAINE WITHOUT AURA AND WITHOUT STATUS MIGRAINOSUS, NOT INTRACTABLE: ICD-10-CM

## 2022-04-13 NOTE — TELEPHONE ENCOUNTER
Megco      Last Written Prescription Date:  03/18/2022  Last Fill Quantity: 10,   # refills: 0  Last Office Visit: 03/25/2022  Future Office visit:       Routing refill request to provider for review/approval because:  Drug not on the [facility], [facility] or [facility] refill protocol or controlled substance

## 2022-04-14 NOTE — PROGRESS NOTES
"Omar is a 31 year old  female who presents for request to have her uterus removed and annual.  Discussion focused on her GYN complaints and suggest she follow up with F.P. for annual    Besides routine health maintenance,  she would like to discuss having a partial hysterectomy. Patient states she's had 2 miscarriages since August, has painful intercourse.    I spent a total of 35 minutes on the care of Azalia on the day of service including 30 minutes in face-to-face time and the remainder in chart review, care coordination, documentation on the day of service.    Azalia is a 31-year-old  her last menstrual period was on  and she is currently using no birth control.  She complains of irregular periods states that her periods are all over the place.  When looking at more further information she notes that sometimes she bleeds for 2 weeks a month sometimes for 1 sometimes she has 2 days of spotting followed by 5 days of heavy periods where she is changing tampons every hour.  Her most recent period lasted 7 days she states that 5 days were heavy 2 days were light.  She notes that she has pain with intercourse this is seems to be new since her last miscarriage.  Her last miscarriage was followed by Dr. Olivier with hCGs and took place in February of this year.    She also complains of cramps does not take any particular medications.    GYN history:  She has had no history of STDs.  She states that she is sexually active but more recently now having shooting pains in the midline.  She states that her right ovary hurts regularly meaning frequently.  Her left ovary has hurt with her miscarriage but seems to be less of an issue.  She has had a history of for IUD placements, none with satisfactory results.  She has had the Ortho Evra patch that she says she used for a year.  She was not satisfied with that.  She was also on Depo but she says she had 1 shot but was very \"hormonal\".  She also " states that she was on the pills for an undisclosed amount of time and was finding to have irregular periods.  She states that she was on the low-dose birth control pill.    Surgical history:  She has had her gallbladder removed in 2009.    Past medical history:  More recently she has been complaining of back pain for which she had an MRI those results have not been reviewed.  She also has a history of migraines and asthma.    Past OB history:  She has had 4 NSVDs 13, 10, 4, 2 years ago.  She is also had 2 miscarriages 1 last summer and one this past February.    Family history:  She states that she has had many family members putting mother on his cousins having hysterectomies.  She states that her uterus is tilted and she thinks she has endometriosis and would like to have her uterus removed.    Azalia and I talked extensively today about indications for hysterectomy and then also plan for the future.  I explained to her that the symptoms that she has to sound hormonal and are most often managed with medications.  Suggest that she at least try another birth control pill and follow-up in a birth control of Desogen has been prescribed for her.  I explained to her that with her symptoms and the generality of them all that a hysterectomy would not be indicated unless something further was discovered.  Suggest however having a diagnostic scope if she is not satisfied with birth control pill intervention.    In discussing the diagnostic scope she also came to the discussion of having her tubes tied if she is not can get a hysterectomy which I think would be an optimal time to have that done should her anatomy be without significant issue.    Exam:  Omar is a pleasant and appropriate 31-year-old.  In no apparent distress.  Abdomen is soft nontender no guarding masses or rebound.  No CVA tenderness, she points to areas consistent with ovarian referred pain is where she has pain.  External genitalia are normal,  vaginal mucosa is free of lesions and has normal discharge.  Cervix is without lesion.  Uterus is freely mobile and nontender.  She complains of small amount of tenderness with palpation of both adnexa, no substantial masses are detected.    Assessment:  Is a 31-year-old who is had multiple pregnancies and irregular.  She is also been pregnant quite frequently and complaining of irregular periods.  At this point I do not see a good indication for having a hysterectomy but do suggest medical management.  Also discussed family history is not a good indication the with the mere fact that multiple members of the family have had hysterectomies and therefore she would like to have 1 to.  I did discuss also the complications that can come with having hysterectomy leading to unsatisfactory results.  She seems flexible at this point to try medical management and follow-up after that.    Plan:  A prescription for Desogen has been prescribed.  I suggest that she try this for a couple months and would like to follow-up with her after that to see how she is managing.  A pelvic ultrasound has also been ordered and will call her with those results or my chart.  In this case most interested in her her adnexal anatomy and whether there is any process going on with the ovaries.  Pap smear is deferred until next December.      GYNECOLOGIC HISTORY:    Patient's last menstrual period was 2022.    Regular menses? no   Menses irregular.  Length of menses: 7 days    Her current contraception method is: none.  She  reports that she has been smoking cigarettes. She has a 5.00 pack-year smoking history. She has never used smokeless tobacco.    Patient is sexually active.  STD testing offered?  No concerns        Health maintenance updated:  no    HISTORY:  OB History    Para Term  AB Living   5 4 4 0 1 4   SAB IAB Ectopic Multiple Live Births   1 0 0 0 4      # Outcome Date GA Lbr Arcadio/2nd Weight Sex Delivery Anes PTL Lv    5 SAB 22 6w0d          4 Term 20 38w5d 04:18 / 00:01 3.525 kg (7 lb 12.3 oz) F Vag-Spont None  MADAY      Name: Georgina      Apgar1: 10  Apgar5: 10   3 Term 17 39w0d  3.606 kg (7 lb 15.2 oz) F   N MADAY      Name: Brittani      Apgar1: 8  Apgar5: 9   2 Term 12 38w0d   F    MADAY      Name: Maria Del Carmen   1 Term 08 40w0d   M    MADAY      Name: Eula       Patient Active Problem List   Diagnosis     Migraine without aura and without status migrainosus, not intractable     Chronic bilateral low back pain with bilateral sciatica     Greater trochanteric bursitis of both hips     Chronic hip pain, bilateral     Past Surgical History:   Procedure Laterality Date     CHOLECYSTECTOMY  2009     GALLBLADDER SURGERY            Social History     Tobacco Use     Smoking status: Light Tobacco Smoker     Packs/day: 0.50     Years: 10.00     Pack years: 5.00     Types: Cigarettes     Smokeless tobacco: Never Used     Tobacco comment: 1-3 daily   Substance Use Topics     Alcohol use: Not Currently     Comment: very rarely      No data available            Current Outpatient Medications   Medication Sig     HYDROcodone-acetaminophen (NORCO) 5-325 MG tablet Take 1-2 tablets by mouth daily as needed for severe pain (severe headache)     SUMAtriptan (IMITREX) 100 MG tablet Take 1 tablet (100 mg) by mouth at onset of headache for migraine May repeat in 2 hours. Max 4 tablets/24 hours.     ondansetron (ZOFRAN-ODT) 4 MG ODT tab Take 1 tablet (4 mg) by mouth every 8 hours as needed for nausea (Patient not taking: No sig reported)     No current facility-administered medications for this visit.     No Known Allergies    Past medical, surgical, social and family histories were reviewed and updated in EPIC.    ROS:   12 point review of systems negative other than symptoms noted below or in the HPI.  No urinary frequency or dysuria, bladder or kidney problems      BMI: Body mass index is 23.63  kg/m .      ASSESSMENT:  31 year old female with satisfactory annual exam.  See above  PLAN:  As above    Axel Leon MD

## 2022-04-15 ENCOUNTER — OFFICE VISIT (OUTPATIENT)
Dept: OBGYN | Facility: CLINIC | Age: 32
End: 2022-04-15
Payer: COMMERCIAL

## 2022-04-15 VITALS
HEIGHT: 65 IN | BODY MASS INDEX: 23.66 KG/M2 | DIASTOLIC BLOOD PRESSURE: 64 MMHG | SYSTOLIC BLOOD PRESSURE: 110 MMHG | WEIGHT: 142 LBS

## 2022-04-15 DIAGNOSIS — R10.2 PELVIC PAIN IN FEMALE: ICD-10-CM

## 2022-04-15 DIAGNOSIS — N92.1 MENORRHAGIA WITH IRREGULAR CYCLE: Primary | ICD-10-CM

## 2022-04-15 PROCEDURE — 99214 OFFICE O/P EST MOD 30 MIN: CPT | Performed by: OBSTETRICS & GYNECOLOGY

## 2022-04-15 RX ORDER — DESOGESTREL AND ETHINYL ESTRADIOL 0.15-0.03
1 KIT ORAL DAILY
Qty: 84 TABLET | Refills: 3 | Status: SHIPPED | OUTPATIENT
Start: 2022-04-15 | End: 2022-05-16

## 2022-04-18 RX ORDER — HYDROCODONE BITARTRATE AND ACETAMINOPHEN 5; 325 MG/1; MG/1
1-2 TABLET ORAL DAILY PRN
Qty: 10 TABLET | Refills: 0 | Status: SHIPPED | OUTPATIENT
Start: 2022-04-18 | End: 2022-05-19

## 2022-04-25 ENCOUNTER — MYC MEDICAL ADVICE (OUTPATIENT)
Dept: OBGYN | Facility: CLINIC | Age: 32
End: 2022-04-25
Payer: COMMERCIAL

## 2022-04-25 NOTE — TELEPHONE ENCOUNTER
LMP 3/12/2022, currently 6w2d.    Pt currently taking amoxicillin 500 MG TID for tooth abscess. Pt has hx of 2 miscarriages since August 2021.    RN advised prenatal appt around 10 weeks gestation and to notify us for any sooner concerning symptoms.    RN routing to provider as FYI as pt just saw Dr. Leon on 4/15 to discuss partial hysterectomy.    Larisa Maher RN on 4/25/2022 at 4:08 PM            
64.8

## 2022-05-11 ENCOUNTER — TELEPHONE (OUTPATIENT)
Dept: OBGYN | Facility: OTHER | Age: 32
End: 2022-05-11
Payer: COMMERCIAL

## 2022-05-11 NOTE — TELEPHONE ENCOUNTER
Reason for Call:  Other call back    Detailed comments: pt calling - states was told by RN to schedule appt at 10 weeks OB (history of miscarriage) pt would like to see Dr. Leon in [place]. Could pt be worked in? Looks like 5/20 is currently on hold     Phone Number Patient can be reached at: Home number on file [phone] (home)    Best Time: any    Can we leave a detailed message on this number? YES    Call taken on 5/11/2022 at 3:15 PM by Paige Negron

## 2022-05-12 NOTE — TELEPHONE ENCOUNTER
Called patient and left message for patient to return call.    When patient calls back ok to add to Dr. Mendez schedule on 5/20/2022 as a new prental.    Irish Gonzáles, [facility]  May 12, 2022

## 2022-05-12 NOTE — TELEPHONE ENCOUNTER
Now that pt is set up with OB Intake could she be worked in with Dr. Leon in [place]? 10 week date is 5/20

## 2022-05-16 ENCOUNTER — PRENATAL OFFICE VISIT (OUTPATIENT)
Dept: FAMILY MEDICINE | Facility: CLINIC | Age: 32
End: 2022-05-16
Payer: COMMERCIAL

## 2022-05-16 DIAGNOSIS — Z34.90 SUPERVISION OF NORMAL PREGNANCY: Primary | ICD-10-CM

## 2022-05-16 PROCEDURE — 99207 PR NO CHARGE NURSE ONLY: CPT

## 2022-05-16 RX ORDER — PRENATAL VIT/IRON FUM/FOLIC AC 27MG-0.8MG
1 TABLET ORAL DAILY
COMMUNITY
End: 2022-06-07

## 2022-05-16 NOTE — PROGRESS NOTES
OB Intake phone visit with verbal patient permission.    Omar has not tested positive for covid and is not vaccinated.

## 2022-05-17 ENCOUNTER — TELEPHONE (OUTPATIENT)
Dept: FAMILY MEDICINE | Facility: CLINIC | Age: 32
End: 2022-05-17
Payer: COMMERCIAL

## 2022-05-17 ENCOUNTER — MYC MEDICAL ADVICE (OUTPATIENT)
Dept: OBGYN | Facility: CLINIC | Age: 32
End: 2022-05-17
Payer: COMMERCIAL

## 2022-05-17 DIAGNOSIS — O26.859 SPOTTING IN PREGNANCY: Primary | ICD-10-CM

## 2022-05-17 NOTE — TELEPHONE ENCOUNTER
Phone call from Omar.  She starting having brownish vaginal discharge this morning.  She says this is how her previous miscarriages started, and she's concerned.  Should be approx. 9 1/2 weeks gestation now.  She's scheduled for 1st OB lab work and ultrasound tomorrow.  I offered to move the ultrasound up to today but she declined and prefers to wait until tomorrow.  Ultrasound scheduling phone number given in case she changes her mind and decides to have the ultrasound today.  HCG quant to be drawn tomorrow ordered.  Dr. Leon notified of above via [place].

## 2022-05-18 ENCOUNTER — HOSPITAL ENCOUNTER (OUTPATIENT)
Dept: ULTRASOUND IMAGING | Facility: CLINIC | Age: 32
Discharge: HOME | End: 2022-05-18
Attending: OBSTETRICS & GYNECOLOGY | Admitting: OBSTETRICS & GYNECOLOGY
Payer: COMMERCIAL

## 2022-05-18 ENCOUNTER — LAB (OUTPATIENT)
Dept: LAB | Facility: CLINIC | Age: 32
End: 2022-05-18
Payer: COMMERCIAL

## 2022-05-18 DIAGNOSIS — O26.859 SPOTTING IN PREGNANCY: ICD-10-CM

## 2022-05-18 DIAGNOSIS — Z34.90 SUPERVISION OF NORMAL PREGNANCY: ICD-10-CM

## 2022-05-18 LAB — B-HCG SERPL-ACNC: 6244 IU/L (ref 0–5)

## 2022-05-18 PROCEDURE — 76801 OB US < 14 WKS SINGLE FETUS: CPT

## 2022-05-18 PROCEDURE — 36415 COLL VENOUS BLD VENIPUNCTURE: CPT

## 2022-05-18 PROCEDURE — 84702 CHORIONIC GONADOTROPIN TEST: CPT

## 2022-05-19 ENCOUNTER — MYC REFILL (OUTPATIENT)
Dept: FAMILY MEDICINE | Facility: CLINIC | Age: 32
End: 2022-05-19
Payer: COMMERCIAL

## 2022-05-19 ENCOUNTER — MYC MEDICAL ADVICE (OUTPATIENT)
Dept: OBGYN | Facility: CLINIC | Age: 32
End: 2022-05-19
Payer: COMMERCIAL

## 2022-05-19 DIAGNOSIS — G43.009 MIGRAINE WITHOUT AURA AND WITHOUT STATUS MIGRAINOSUS, NOT INTRACTABLE: ICD-10-CM

## 2022-05-19 DIAGNOSIS — M70.61 GREATER TROCHANTERIC BURSITIS OF RIGHT HIP: ICD-10-CM

## 2022-05-19 NOTE — TELEPHONE ENCOUNTER
Pt had an OB US yesterday, 5/18.  Not yet reviewed or interpreted by ordering provider.  Pt is writing in asking what this means as her HCG quant was 6,244.    Routing to Dr. Leon to review pt's US.    Pt has been experiencing some bleeding with passing of small clots and lots of abdominal cramping.    Pt does have a dentist appt today for a root canal and is asking if it is okay that she keeps this due to what is going on right now.    Routing to Dr. Leon to review pt's US and interpret and advise as well as advise on keeping her root canal appt today.    Jenna Victro, RN    Addendum: pt wrote in and states she canceled her dental appt.

## 2022-05-19 NOTE — TELEPHONE ENCOUNTER
[place]co      Last Written Prescription Date:  4-  Last Fill Quantity: 10,   # refills: 0  Last Office Visit: 3-  Future Office visit:    Next 5 appointments (look out 90 days)    May 31, 2022  9:00 AM  New Prenatal with Axel Leon MD  [facility] ([facility] ) [address] [place] [address] [id number] [phone]           Routing refill request to provider for review/approval because:  Drug not on the [facility], [facility] or [facility] refill protocol or controlled substance

## 2022-05-20 ENCOUNTER — LAB (OUTPATIENT)
Dept: LAB | Facility: CLINIC | Age: 32
End: 2022-05-20
Payer: COMMERCIAL

## 2022-05-20 DIAGNOSIS — O26.859 SPOTTING IN PREGNANCY: ICD-10-CM

## 2022-05-20 LAB — B-HCG SERPL-ACNC: 5546 IU/L (ref 0–5)

## 2022-05-20 PROCEDURE — 36415 COLL VENOUS BLD VENIPUNCTURE: CPT

## 2022-05-20 PROCEDURE — 84702 CHORIONIC GONADOTROPIN TEST: CPT

## 2022-05-20 RX ORDER — HYDROCODONE BITARTRATE AND ACETAMINOPHEN 5; 325 MG/1; MG/1
1-2 TABLET ORAL DAILY PRN
Qty: 10 TABLET | Refills: 0 | Status: SHIPPED | OUTPATIENT
Start: 2022-05-20 | End: 2022-06-07

## 2022-05-20 NOTE — TELEPHONE ENCOUNTER
RN called Dr. Leon with results. Dr. Leon states he will call pt to discuss next steps of watching HCGs trend and review ectopic precautions.    Larisa Maher RN on 5/20/2022 at 2:29 PM

## 2022-05-20 NOTE — TELEPHONE ENCOUNTER
Axel Leon MD Netland, Heidi, RN;  Ob/Gyn Triage 7 minutes ago (11:31 AM)     DB    Just called Omar,   I suggest she get the follow up HCG so I/we can better advise her.   Likely this pregnancy is not viable.   If HCG is going up I would be concerned that this is an ectopic in which case a follow up US was suggested to her for next week.   If it's going down we should just follow it by seeing her next week.   I'll order an US in case it goes that way.     When you see the HCG come back today please call me as I wont be by a computer.     ILDEFONSO   [phone]    Message text       Larisa Maher, RN on 5/20/2022 at 11:39 AM

## 2022-05-21 NOTE — TELEPHONE ENCOUNTER
Approved. She had follow up with Dr. Leon re: miscarriage and is planning sterilization in the future, has appt set for follow up.   Rosario Casillas MD

## 2022-05-22 ENCOUNTER — MYC MEDICAL ADVICE (OUTPATIENT)
Dept: FAMILY MEDICINE | Facility: CLINIC | Age: 32
End: 2022-05-22
Payer: COMMERCIAL

## 2022-05-22 DIAGNOSIS — O03.9 SPONTANEOUS ABORTION: Primary | ICD-10-CM

## 2022-05-23 RX ORDER — OXYCODONE HYDROCHLORIDE 5 MG/1
5 TABLET ORAL EVERY 6 HOURS PRN
Qty: 12 TABLET | Refills: 0 | Status: SHIPPED | OUTPATIENT
Start: 2022-05-23 | End: 2022-05-26

## 2022-05-23 NOTE — TELEPHONE ENCOUNTER
Dr. Leon wanted labs drawn on Friday.  Patient is informed to complete these on Friday as requested by Dr. Leon.  Closing this encounter.  Irish Pearl, DASHAN, RN

## 2022-05-23 NOTE — TELEPHONE ENCOUNTER
See mychart note to patient. I need her to have HCG level drawn in 1-2 days.   Rosario Casillas MD

## 2022-05-25 ENCOUNTER — APPOINTMENT (OUTPATIENT)
Dept: ULTRASOUND IMAGING | Facility: CLINIC | Age: 32
End: 2022-05-25
Attending: FAMILY MEDICINE
Payer: COMMERCIAL

## 2022-05-25 ENCOUNTER — HOSPITAL ENCOUNTER (OUTPATIENT)
Facility: CLINIC | Age: 32
Discharge: HOME | End: 2022-05-26
Attending: FAMILY MEDICINE | Admitting: FAMILY MEDICINE
Payer: COMMERCIAL

## 2022-05-25 DIAGNOSIS — G89.18 POST-OP PAIN: Primary | ICD-10-CM

## 2022-05-25 DIAGNOSIS — O03.4 INCOMPLETE MISCARRIAGE: ICD-10-CM

## 2022-05-25 LAB
ABO/RH(D): NORMAL
ANION GAP SERPL CALCULATED.3IONS-SCNC: 4 MMOL/L (ref 3–14)
B-HCG SERPL-ACNC: 3728 IU/L (ref 0–5)
BASOPHILS # BLD AUTO: 0.1 10E3/UL (ref 0–0.2)
BASOPHILS NFR BLD AUTO: 1 %
BUN SERPL-MCNC: 7 MG/DL (ref 7–30)
CALCIUM SERPL-MCNC: 9.1 MG/DL (ref 8.5–10.1)
CHLORIDE BLD-SCNC: 111 MMOL/L (ref 94–109)
CO2 SERPL-SCNC: 27 MMOL/L (ref 20–32)
CREAT SERPL-MCNC: 0.59 MG/DL (ref 0.52–1.04)
EOSINOPHIL # BLD AUTO: 0.3 10E3/UL (ref 0–0.7)
EOSINOPHIL NFR BLD AUTO: 3 %
ERYTHROCYTE [DISTWIDTH] IN BLOOD BY AUTOMATED COUNT: 13 % (ref 10–15)
FBST KIT EXP: NORMAL
FBST LOT #: NORMAL
FETAL BLEED SCREEN: NORMAL
GFR SERPL CREATININE-BSD FRML MDRD: >90 ML/MIN/1.73M2
GLUCOSE BLD-MCNC: 101 MG/DL (ref 70–99)
HCT VFR BLD AUTO: 39.1 % (ref 35–47)
HGB BLD-MCNC: 12.8 G/DL (ref 11.7–15.7)
IMM GRANULOCYTES # BLD: 0 10E3/UL
IMM GRANULOCYTES NFR BLD: 0 %
LYMPHOCYTES # BLD AUTO: 3 10E3/UL (ref 0.8–5.3)
LYMPHOCYTES NFR BLD AUTO: 35 %
MCH RBC QN AUTO: 29.2 PG (ref 26.5–33)
MCHC RBC AUTO-ENTMCNC: 32.7 G/DL (ref 31.5–36.5)
MCV RBC AUTO: 89 FL (ref 78–100)
MONOCYTES # BLD AUTO: 0.5 10E3/UL (ref 0–1.3)
MONOCYTES NFR BLD AUTO: 6 %
NEUTROPHILS # BLD AUTO: 4.6 10E3/UL (ref 1.6–8.3)
NEUTROPHILS NFR BLD AUTO: 55 %
NRBC # BLD AUTO: 0 10E3/UL
NRBC BLD AUTO-RTO: 0 /100
PLATELET # BLD AUTO: 251 10E3/UL (ref 150–450)
POTASSIUM BLD-SCNC: 3.7 MMOL/L (ref 3.4–5.3)
RBC # BLD AUTO: 4.38 10E6/UL (ref 3.8–5.2)
SODIUM SERPL-SCNC: 142 MMOL/L (ref 133–144)
SPECIMEN EXPIRATION DATE: NORMAL
WBC # BLD AUTO: 8.4 10E3/UL (ref 4–11)

## 2022-05-25 PROCEDURE — 96375 TX/PRO/DX INJ NEW DRUG ADDON: CPT | Performed by: FAMILY MEDICINE

## 2022-05-25 PROCEDURE — 96374 THER/PROPH/DIAG INJ IV PUSH: CPT | Performed by: FAMILY MEDICINE

## 2022-05-25 PROCEDURE — 76801 OB US < 14 WKS SINGLE FETUS: CPT

## 2022-05-25 PROCEDURE — C9803 HOPD COVID-19 SPEC COLLECT: HCPCS | Performed by: FAMILY MEDICINE

## 2022-05-25 PROCEDURE — 82310 ASSAY OF CALCIUM: CPT | Performed by: FAMILY MEDICINE

## 2022-05-25 PROCEDURE — 250N000011 HC RX IP 250 OP 636: Performed by: FAMILY MEDICINE

## 2022-05-25 PROCEDURE — 86901 BLOOD TYPING SEROLOGIC RH(D): CPT | Performed by: FAMILY MEDICINE

## 2022-05-25 PROCEDURE — 85025 COMPLETE CBC W/AUTO DIFF WBC: CPT | Performed by: FAMILY MEDICINE

## 2022-05-25 PROCEDURE — 99285 EMERGENCY DEPT VISIT HI MDM: CPT | Mod: 25 | Performed by: FAMILY MEDICINE

## 2022-05-25 PROCEDURE — 84702 CHORIONIC GONADOTROPIN TEST: CPT | Performed by: FAMILY MEDICINE

## 2022-05-25 PROCEDURE — 36415 COLL VENOUS BLD VENIPUNCTURE: CPT | Performed by: FAMILY MEDICINE

## 2022-05-25 PROCEDURE — 258N000003 HC RX IP 258 OP 636: Performed by: FAMILY MEDICINE

## 2022-05-25 PROCEDURE — 96361 HYDRATE IV INFUSION ADD-ON: CPT | Performed by: FAMILY MEDICINE

## 2022-05-25 PROCEDURE — 99285 EMERGENCY DEPT VISIT HI MDM: CPT | Performed by: FAMILY MEDICINE

## 2022-05-25 RX ORDER — FENTANYL CITRATE 50 UG/ML
50 INJECTION, SOLUTION INTRAMUSCULAR; INTRAVENOUS ONCE
Status: COMPLETED | OUTPATIENT
Start: 2022-05-25 | End: 2022-05-25

## 2022-05-25 RX ORDER — HYDROMORPHONE HYDROCHLORIDE 1 MG/ML
0.5 INJECTION, SOLUTION INTRAMUSCULAR; INTRAVENOUS; SUBCUTANEOUS ONCE
Status: COMPLETED | OUTPATIENT
Start: 2022-05-26 | End: 2022-05-26

## 2022-05-25 RX ORDER — ONDANSETRON 2 MG/ML
4 INJECTION INTRAMUSCULAR; INTRAVENOUS EVERY 30 MIN PRN
Status: DISCONTINUED | OUTPATIENT
Start: 2022-05-25 | End: 2022-05-26 | Stop reason: HOSPADM

## 2022-05-25 RX ADMIN — SODIUM CHLORIDE, POTASSIUM CHLORIDE, SODIUM LACTATE AND CALCIUM CHLORIDE 500 ML: 600; 310; 30; 20 INJECTION, SOLUTION INTRAVENOUS at 21:58

## 2022-05-25 RX ADMIN — FENTANYL CITRATE 50 MCG: 50 INJECTION, SOLUTION INTRAMUSCULAR; INTRAVENOUS at 22:10

## 2022-05-25 RX ADMIN — ONDANSETRON 4 MG: 2 INJECTION INTRAMUSCULAR; INTRAVENOUS at 22:09

## 2022-05-25 RX ADMIN — HUMAN RHO(D) IMMUNE GLOBULIN 300 MCG: 1500 SOLUTION INTRAMUSCULAR; INTRAVENOUS at 22:13

## 2022-05-26 ENCOUNTER — ANESTHESIA EVENT (OUTPATIENT)
Dept: SURGERY | Facility: CLINIC | Age: 32
End: 2022-05-26
Payer: COMMERCIAL

## 2022-05-26 ENCOUNTER — ANESTHESIA (OUTPATIENT)
Dept: SURGERY | Facility: CLINIC | Age: 32
End: 2022-05-26
Payer: COMMERCIAL

## 2022-05-26 VITALS
WEIGHT: 144 LBS | SYSTOLIC BLOOD PRESSURE: 101 MMHG | TEMPERATURE: 97.8 F | RESPIRATION RATE: 16 BRPM | DIASTOLIC BLOOD PRESSURE: 68 MMHG | BODY MASS INDEX: 23.96 KG/M2 | HEART RATE: 71 BPM | OXYGEN SATURATION: 98 %

## 2022-05-26 LAB — SARS-COV-2 RNA RESP QL NAA+PROBE: NEGATIVE

## 2022-05-26 PROCEDURE — 88305 TISSUE EXAM BY PATHOLOGIST: CPT | Mod: TC | Performed by: OBSTETRICS & GYNECOLOGY

## 2022-05-26 PROCEDURE — 59812 TREATMENT OF MISCARRIAGE: CPT | Performed by: OBSTETRICS & GYNECOLOGY

## 2022-05-26 PROCEDURE — 250N000011 HC RX IP 250 OP 636: Performed by: FAMILY MEDICINE

## 2022-05-26 PROCEDURE — 250N000009 HC RX 250: Performed by: OBSTETRICS & GYNECOLOGY

## 2022-05-26 PROCEDURE — 87635 SARS-COV-2 COVID-19 AMP PRB: CPT | Performed by: FAMILY MEDICINE

## 2022-05-26 PROCEDURE — 250N000013 HC RX MED GY IP 250 OP 250 PS 637: Performed by: NURSE ANESTHETIST, CERTIFIED REGISTERED

## 2022-05-26 PROCEDURE — 250N000011 HC RX IP 250 OP 636: Performed by: NURSE ANESTHETIST, CERTIFIED REGISTERED

## 2022-05-26 PROCEDURE — 710N000012 HC RECOVERY PHASE 2, PER MINUTE: Performed by: OBSTETRICS & GYNECOLOGY

## 2022-05-26 PROCEDURE — 250N000009 HC RX 250: Performed by: NURSE ANESTHETIST, CERTIFIED REGISTERED

## 2022-05-26 PROCEDURE — 360N000075 HC SURGERY LEVEL 2, PER MIN: Performed by: OBSTETRICS & GYNECOLOGY

## 2022-05-26 PROCEDURE — 258N000003 HC RX IP 258 OP 636: Performed by: NURSE ANESTHETIST, CERTIFIED REGISTERED

## 2022-05-26 PROCEDURE — 272N000001 HC OR GENERAL SUPPLY STERILE: Performed by: OBSTETRICS & GYNECOLOGY

## 2022-05-26 PROCEDURE — 370N000017 HC ANESTHESIA TECHNICAL FEE, PER MIN: Performed by: OBSTETRICS & GYNECOLOGY

## 2022-05-26 RX ORDER — HYDROMORPHONE HYDROCHLORIDE 2 MG/1
2 TABLET ORAL
Status: DISCONTINUED | OUTPATIENT
Start: 2022-05-26 | End: 2022-05-26 | Stop reason: HOSPADM

## 2022-05-26 RX ORDER — ACETAMINOPHEN 325 MG/1
975 TABLET ORAL ONCE
Status: CANCELLED | OUTPATIENT
Start: 2022-05-26 | End: 2022-05-26

## 2022-05-26 RX ORDER — SODIUM CHLORIDE, SODIUM LACTATE, POTASSIUM CHLORIDE, CALCIUM CHLORIDE 600; 310; 30; 20 MG/100ML; MG/100ML; MG/100ML; MG/100ML
INJECTION, SOLUTION INTRAVENOUS CONTINUOUS PRN
Status: DISCONTINUED | OUTPATIENT
Start: 2022-05-26 | End: 2022-05-26

## 2022-05-26 RX ORDER — FENTANYL CITRATE 50 UG/ML
INJECTION, SOLUTION INTRAMUSCULAR; INTRAVENOUS PRN
Status: DISCONTINUED | OUTPATIENT
Start: 2022-05-26 | End: 2022-05-26

## 2022-05-26 RX ORDER — IBUPROFEN 800 MG/1
800 TABLET, FILM COATED ORAL ONCE
Status: CANCELLED | OUTPATIENT
Start: 2022-05-26 | End: 2022-05-26

## 2022-05-26 RX ORDER — OXYCODONE HYDROCHLORIDE 5 MG/1
5 TABLET ORAL EVERY 4 HOURS PRN
Status: DISCONTINUED | OUTPATIENT
Start: 2022-05-26 | End: 2022-05-26 | Stop reason: HOSPADM

## 2022-05-26 RX ORDER — PROPOFOL 10 MG/ML
INJECTION, EMULSION INTRAVENOUS CONTINUOUS PRN
Status: DISCONTINUED | OUTPATIENT
Start: 2022-05-26 | End: 2022-05-26

## 2022-05-26 RX ORDER — ONDANSETRON 2 MG/ML
4 INJECTION INTRAMUSCULAR; INTRAVENOUS EVERY 30 MIN PRN
Status: DISCONTINUED | OUTPATIENT
Start: 2022-05-26 | End: 2022-05-26 | Stop reason: HOSPADM

## 2022-05-26 RX ORDER — HYDROMORPHONE HYDROCHLORIDE 2 MG/1
2 TABLET ORAL EVERY 6 HOURS PRN
Qty: 5 TABLET | Refills: 0 | Status: SHIPPED | OUTPATIENT
Start: 2022-05-26 | End: 2022-05-29

## 2022-05-26 RX ORDER — BUPIVACAINE HYDROCHLORIDE AND EPINEPHRINE 2.5; 5 MG/ML; UG/ML
INJECTION, SOLUTION INFILTRATION; PERINEURAL PRN
Status: DISCONTINUED | OUTPATIENT
Start: 2022-05-26 | End: 2022-05-26 | Stop reason: HOSPADM

## 2022-05-26 RX ORDER — KETOROLAC TROMETHAMINE 30 MG/ML
INJECTION, SOLUTION INTRAMUSCULAR; INTRAVENOUS PRN
Status: DISCONTINUED | OUTPATIENT
Start: 2022-05-26 | End: 2022-05-26

## 2022-05-26 RX ORDER — FENTANYL CITRATE 50 UG/ML
25 INJECTION, SOLUTION INTRAMUSCULAR; INTRAVENOUS
Status: DISCONTINUED | OUTPATIENT
Start: 2022-05-26 | End: 2022-05-26 | Stop reason: HOSPADM

## 2022-05-26 RX ORDER — LIDOCAINE HYDROCHLORIDE 20 MG/ML
INJECTION, SOLUTION INFILTRATION; PERINEURAL PRN
Status: DISCONTINUED | OUTPATIENT
Start: 2022-05-26 | End: 2022-05-26

## 2022-05-26 RX ORDER — IBUPROFEN 800 MG/1
800 TABLET, FILM COATED ORAL EVERY 6 HOURS PRN
Qty: 10 TABLET | Refills: 0 | Status: ON HOLD | OUTPATIENT
Start: 2022-05-26 | End: 2022-07-28

## 2022-05-26 RX ORDER — SODIUM CHLORIDE, SODIUM LACTATE, POTASSIUM CHLORIDE, CALCIUM CHLORIDE 600; 310; 30; 20 MG/100ML; MG/100ML; MG/100ML; MG/100ML
INJECTION, SOLUTION INTRAVENOUS CONTINUOUS
Status: DISCONTINUED | OUTPATIENT
Start: 2022-05-26 | End: 2022-05-26 | Stop reason: HOSPADM

## 2022-05-26 RX ORDER — ONDANSETRON 2 MG/ML
INJECTION INTRAMUSCULAR; INTRAVENOUS PRN
Status: DISCONTINUED | OUTPATIENT
Start: 2022-05-26 | End: 2022-05-26

## 2022-05-26 RX ORDER — ONDANSETRON 4 MG/1
4 TABLET, ORALLY DISINTEGRATING ORAL EVERY 30 MIN PRN
Status: DISCONTINUED | OUTPATIENT
Start: 2022-05-26 | End: 2022-05-26 | Stop reason: HOSPADM

## 2022-05-26 RX ORDER — PROPOFOL 10 MG/ML
INJECTION, EMULSION INTRAVENOUS PRN
Status: DISCONTINUED | OUTPATIENT
Start: 2022-05-26 | End: 2022-05-26

## 2022-05-26 RX ORDER — MEPERIDINE HYDROCHLORIDE 25 MG/ML
12.5 INJECTION INTRAMUSCULAR; INTRAVENOUS; SUBCUTANEOUS
Status: DISCONTINUED | OUTPATIENT
Start: 2022-05-26 | End: 2022-05-26 | Stop reason: HOSPADM

## 2022-05-26 RX ADMIN — FENTANYL CITRATE 50 MCG: 50 INJECTION, SOLUTION INTRAMUSCULAR; INTRAVENOUS at 06:30

## 2022-05-26 RX ADMIN — PROPOFOL 200 MCG/KG/MIN: 10 INJECTION, EMULSION INTRAVENOUS at 06:38

## 2022-05-26 RX ADMIN — SODIUM CHLORIDE, POTASSIUM CHLORIDE, SODIUM LACTATE AND CALCIUM CHLORIDE: 600; 310; 30; 20 INJECTION, SOLUTION INTRAVENOUS at 06:33

## 2022-05-26 RX ADMIN — OXYCODONE HYDROCHLORIDE 5 MG: 5 TABLET ORAL at 07:53

## 2022-05-26 RX ADMIN — PROPOFOL 80 MG: 10 INJECTION, EMULSION INTRAVENOUS at 06:38

## 2022-05-26 RX ADMIN — KETOROLAC TROMETHAMINE 30 MG: 30 INJECTION, SOLUTION INTRAMUSCULAR at 06:55

## 2022-05-26 RX ADMIN — ONDANSETRON 4 MG: 2 INJECTION INTRAMUSCULAR; INTRAVENOUS at 06:38

## 2022-05-26 RX ADMIN — FENTANYL CITRATE 50 MCG: 50 INJECTION, SOLUTION INTRAMUSCULAR; INTRAVENOUS at 06:46

## 2022-05-26 RX ADMIN — LIDOCAINE HYDROCHLORIDE 60 MG: 20 INJECTION, SOLUTION INFILTRATION; PERINEURAL at 06:38

## 2022-05-26 RX ADMIN — HYDROMORPHONE HYDROCHLORIDE 0.5 MG: 1 INJECTION, SOLUTION INTRAMUSCULAR; INTRAVENOUS; SUBCUTANEOUS at 00:14

## 2022-05-26 RX ADMIN — MIDAZOLAM 1 MG: 1 INJECTION INTRAMUSCULAR; INTRAVENOUS at 06:30

## 2022-05-26 RX ADMIN — FENTANYL CITRATE 25 MCG: 50 INJECTION, SOLUTION INTRAMUSCULAR; INTRAVENOUS at 07:54

## 2022-05-26 NOTE — ED PROVIDER NOTES
History     Chief Complaint   Patient presents with     Vaginal Bleeding     HPI  Brittainy N Brachtl is a 31 year old female who presents to the ED tonight with increased vaginal bleeding.  She is in early pregnancy and by her LMP should be 10-1/2 weeks gestation.  She has had some spotting that started over a week ago and is gradually increased in severity.  Tonight she has more heavy bleeding where it was gushing out in the toilet she passed a golf ball sized clot.  She has had some cramping but it seems to be worse on the right side.  Also some low back pain.    She had an ultrasound on 5/18/2022 which did not show any gestational sac.  Endometrium was 2.5 cm in thickness.  Ectopic was not ruled out and they could not see the adnexa adequately because of overlying bowel gas.  Her serum hCG at that time was 6244.  Repeated 2 days later was 5546.  This would be her third miscarriage.  She has 4 children at home ages 13, 10, 4 and 1.  Some nausea but no vomiting.  No lightheadedness.  Has tried oxycodone for pain without relief.       Here with her , Brian.      Allergies:  No Known Allergies    Problem List:    Patient Active Problem List    Diagnosis Date Noted     Chronic hip pain, bilateral 12/30/2020     Priority: Medium     Chronic bilateral low back pain with bilateral sciatica 12/02/2020     Priority: Medium     Greater trochanteric bursitis of both hips 12/02/2020     Priority: Medium     Migraine without aura and without status migrainosus, not intractable 07/09/2020     Priority: Medium        Past Medical History:    Past Medical History:   Diagnosis Date     Asthma      Cystic fibrosis carrier      Migraines        Past Surgical History:    Past Surgical History:   Procedure Laterality Date     CHOLECYSTECTOMY  07/2009       Family History:    Family History   Problem Relation Age of Onset     Cancer Mother      Back Pain Mother      No Known Problems Half-Brother      No Known Problems  Half-Brother      Emphysema Maternal Grandmother      Bronchitis Maternal Grandmother      Cerebrovascular Disease Maternal Grandfather      No Known Problems Paternal Grandmother      No Known Problems Paternal Grandfather      No Known Problems Daughter      No Known Problems Daughter      No Known Problems Daughter        Social History:  Marital Status:   [2]  Social History     Tobacco Use     Smoking status: Light Tobacco Smoker     Packs/day: 0.50     Years: 10.00     Pack years: 5.00     Types: Cigarettes     Smokeless tobacco: Never Used     Tobacco comment: 1-3 daily   Vaping Use     Vaping Use: Never used   Substance Use Topics     Alcohol use: Not Currently     Comment: very rarely     Drug use: Never        Medications:    HYDROcodone-acetaminophen (NORCO) 5-325 MG tablet  ondansetron (ZOFRAN-ODT) 4 MG ODT tab  oxyCODONE (ROXICODONE) 5 MG tablet  Prenatal Vit-Fe Fumarate-FA (PRENATAL MULTIVITAMIN W/IRON) 27-0.8 MG tablet  SUMAtriptan (IMITREX) 100 MG tablet          Review of Systems   All other systems reviewed and are negative.      Physical Exam   BP: (!) 144/93  Pulse: 95  Temp: 98.1  F (36.7  C)  Resp: 16  Weight: 65.3 kg (144 lb)  SpO2: 98 %      Physical Exam  Constitutional:       General: She is not in acute distress.     Appearance: Normal appearance.   HENT:      Mouth/Throat:      Mouth: Mucous membranes are moist.   Eyes:      Extraocular Movements: Extraocular movements intact.   Cardiovascular:      Rate and Rhythm: Normal rate and regular rhythm.   Pulmonary:      Effort: Pulmonary effort is normal.      Breath sounds: Normal breath sounds.   Abdominal:      Palpations: Abdomen is soft.      Tenderness: There is abdominal tenderness (mild RLQ).   Musculoskeletal:         General: Normal range of motion.   Skin:     General: Skin is warm and dry.   Neurological:      General: No focal deficit present.      Mental Status: She is alert and oriented to person, place, and time.    Psychiatric:         Mood and Affect: Mood normal.         ED Course                 Procedures              Critical Care time:  none               Results for orders placed or performed during the hospital encounter of 05/25/22 (from the past 24 hour(s))   CBC with platelets differential    Narrative    The following orders were created for panel order CBC with platelets differential.  Procedure                               Abnormality         Status                     ---------                               -----------         ------                     CBC with platelets and d...[[id number]]                      Final result                 Please view results for these tests on the individual orders.   Basic metabolic panel   Result Value Ref Range    Sodium 142 133 - 144 mmol/L    Potassium 3.7 3.4 - 5.3 mmol/L    Chloride 111 (H) 94 - 109 mmol/L    Carbon Dioxide (CO2) 27 20 - 32 mmol/L    Anion Gap 4 3 - 14 mmol/L    Urea Nitrogen 7 7 - 30 mg/dL    Creatinine 0.59 0.52 - 1.04 mg/dL    Calcium 9.1 8.5 - 10.1 mg/dL    Glucose 101 (H) 70 - 99 mg/dL    GFR Estimate >90 >60 mL/min/1.73m2   HCG quantitative pregnancy   Result Value Ref Range    hCG Quantitative 3,728 (H) 0 - 5 IU/L   RH Immune Globulin Screen   Result Value Ref Range    ABO/RH(D) A NEG     Fetal Bleed Screen NEG Negative    SPECIMEN EXPIRATION DATE [id number]     FBST KIT EXP Recorded      FBST LOT # Kit lot # recorded    CBC with platelets and differential   Result Value Ref Range    WBC Count 8.4 4.0 - 11.0 10e3/uL    RBC Count 4.38 3.80 - 5.20 10e6/uL    Hemoglobin 12.8 11.7 - 15.7 g/dL    Hematocrit 39.1 35.0 - 47.0 %    MCV 89 78 - 100 fL    MCH 29.2 26.5 - 33.0 pg    MCHC 32.7 31.5 - 36.5 g/dL    RDW 13.0 10.0 - 15.0 %    Platelet Count 251 150 - 450 10e3/uL    % Neutrophils 55 %    % Lymphocytes 35 %    % Monocytes 6 %    % Eosinophils 3 %    % Basophils 1 %    % Immature Granulocytes 0 %    NRBCs per 100 WBC 0 <1 /100    Absolute  Neutrophils 4.6 1.6 - 8.3 10e3/uL    Absolute Lymphocytes 3.0 0.8 - 5.3 10e3/uL    Absolute Monocytes 0.5 0.0 - 1.3 10e3/uL    Absolute Eosinophils 0.3 0.0 - 0.7 10e3/uL    Absolute Basophils 0.1 0.0 - 0.2 10e3/uL    Absolute Immature Granulocytes 0.0 <=0.4 10e3/uL    Absolute NRBCs 0.0 10e3/uL   US OB <14 Weeks w Transvaginal    Narrative    EXAM: US OB <14 WEEKS WITH TRANSVAGINAL SINGLE  LOCATION: [facility]  DATE/TIME: 5/25/2022 10:09 PM    INDICATION: Vaginal bleeding, abdominal cramping, more in RLQ, no gestational sac seen on US 5/18/2022, ecoptic not ruled out.  COMPARISON: None.  TECHNIQUE: Transabdominal scans were performed. Endovaginal ultrasound was performed to better visualize the embryo.    FINDINGS:  UTERUS: There are no findings to suggest a gestational sac, yolk sac, or embryo within the uterus. Within the endometrial canal there is abnormal somewhat heterogenous changes with the endometrial stripe measuring approximately 15.4 mm. Given correlative   beta hCG values which have declined from 05/18/2022 to 05/20/2022 to today's value. The changes within the endometrial cavity would be most typical for blood products and likely retained changes of conception. No normal appearing early pregnancy is   seen.    RIGHT OVARY: Not visualized.  LEFT OVARY: Normal.      Impression    IMPRESSION:   1.  Changes most typical for findings of blood products and possible retained products of conception within the endometrial canal. Given the progressive decline of beta hCG, the above findings would be consistent with pregnancy failure.       Asymptomatic COVID-19 Virus (Coronavirus) by PCR Nose    Specimen: Nose; Swab   Result Value Ref Range    SARS CoV2 PCR Negative Negative    Narrative    Testing was performed using the sabiha  SARS-CoV-2 & Influenza A/B Assay on the sabiha  Brenda  System.  This test should be ordered for the detection of SARS-COV-2 in individuals who meet  SARS-CoV-2 clinical and/or epidemiological criteria. Test performance is unknown in asymptomatic patients.  This test is for in vitro diagnostic use under the FDA EUA for laboratories certified under CLIA to perform moderate and/or high complexity testing. This test has not been FDA cleared or approved.  A negative test does not rule out the presence of PCR inhibitors in the specimen or target RNA in concentration below the limit of detection for the assay. The possibility of a false negative should be considered if the patient's recent exposure or clinical presentation suggests COVID-19.  [facility] are certified under the Clinical Laboratory Improvement Amendments of 1988 (CLIA-88) as qualified to perform moderate and/or high complexity laboratory testing.       Medications   ondansetron (ZOFRAN) injection 4 mg (4 mg Intravenous Given 5/25/22 2209)   rho(D) immune globulin (RHOPHYLAC) injection 300 mcg (300 mcg Intravenous Given 5/25/22 2213)     Or   rho(D) immune globulin (RHOPHYLAC) injection 300 mcg ( Intramuscular See Alternative 5/25/22 2213)   fentaNYL (PF) (SUBLIMAZE) injection 50 mcg (50 mcg Intravenous Given 5/25/22 2210)   lactated ringers BOLUS 500 mL (0 mLs Intravenous Stopped 5/25/22 2320)   HYDROmorphone (PF) (DILAUDID) injection 0.5 mg (0.5 mg Intravenous Given 5/26/22 0014)       Assessments & Plan (with Medical Decision Making)  31-year-old multipara at 10-1/2 weeks gestation by dates and ultrasound which did not show an IUP or gestational sac 1 week ago.  Serum hCG has gone down since then and she has been bleeding for about 8 days.  Increasing today with passage of a golf ball size clot and associated blood in the toilet.  No dizziness or lightheadedness.  More lower abdominal cramping and back pain with pain worse on the right lower quadrant.  Ectopic was not ruled out on her ultrasound a week ago.  Blood type is A-.  Repeat ultrasound was ordered.  IV placed.  RhoGAM  work-up initiated.  I spoke with Dr. Axel Leon who is on-call for OB/GYN.  He is aware of her case.  She has been bleeding for over a week now and it may be time to do a D&C.  I spoke with the patient and her  and they are willing to go through with that.  Dr. Leon will plan on doing a suction D&C at 6:30 in the morning.  He will call the OR crew at 5:45.  She will be n.p.o. until then and COVID was sent.  Dilaudid for pain.  COVID-negative.        I have reviewed the nursing notes.    I have reviewed the findings, diagnosis, plan and need for follow up with the patient.       New Prescriptions    No medications on file       Final diagnoses:   Incomplete miscarriage       5/25/2022   [facility] EMERGENCY DEPT     Puma Arce MD  05/26/22 4997

## 2022-05-26 NOTE — BRIEF OP NOTE
[facility]    Brief Operative Note    Pre-operative diagnosis: Miscarriage [O03.9]  Post-operative diagnosis Same as pre-operative diagnosis    Procedure: Procedure(s):  DILATION AND CURETTAGE, UTERUS, USING SUCTION  Surgeon: Surgeon(s) and Role:     * Axel Leon MD - Primary  Anesthesia: General   Estimated Blood Loss: Minimal    Drains: None  Specimens:   ID Type Source Tests Collected by Time Destination   1 : products of conception Products of Conception Products of Conception SURGICAL PATHOLOGY EXAM Axel Leon MD 5/26/2022  6:49 AM      Findings:   None.POC removed and sent to path  Complications: None.  Implants: * No implants in log *

## 2022-05-26 NOTE — ANESTHESIA PREPROCEDURE EVALUATION
Anesthesia Pre-Procedure Evaluation    Patient: Brittainy N Brachtl   MRN: [id number] : 1990        Procedure : Procedure(s):  DILATION AND CURETTAGE, UTERUS, USING SUCTION          Past Medical History:   Diagnosis Date     Asthma      Cystic fibrosis carrier      Migraines       Past Surgical History:   Procedure Laterality Date     CHOLECYSTECTOMY  2009      No Known Allergies   Social History     Tobacco Use     Smoking status: Light Tobacco Smoker     Packs/day: 0.50     Years: 10.00     Pack years: 5.00     Types: Cigarettes     Smokeless tobacco: Never Used     Tobacco comment: 1-3 daily   Substance Use Topics     Alcohol use: Not Currently     Comment: very rarely      Wt Readings from Last 1 Encounters:   22 65.3 kg (144 lb)        Anesthesia Evaluation   Pt has had prior anesthetic. Type: General.    No history of anesthetic complications       ROS/MED HX  ENT/Pulmonary:     (+) Intermittent, asthma Treatment: Inhaler prn,      Neurologic:     (+) migraines,     Cardiovascular:  - neg cardiovascular ROS     METS/Exercise Tolerance:     Hematologic:  - neg hematologic  ROS     Musculoskeletal:  - neg musculoskeletal ROS     GI/Hepatic:  - neg GI/hepatic ROS     Renal/Genitourinary:  - neg Renal ROS     Endo:  - neg endo ROS     Psychiatric/Substance Use:       Infectious Disease:  - neg infectious disease ROS     Malignancy:  - neg malignancy ROS     Other:      (+) Possibly pregnant, ,         Physical Exam    Airway        Mallampati: I   TM distance: > 3 FB   Neck ROM: full   Mouth opening: > 3 cm    Respiratory Devices and Support         Dental  no notable dental history         Cardiovascular   cardiovascular exam normal          Pulmonary   pulmonary exam normal                OUTSIDE LABS:  CBC:   Lab Results   Component Value Date    WBC 8.4 2022    WBC 10.8 2020    HGB 12.8 2022    HGB 14.7 2020    HCT 39.1 2022    HCT 45.5 2020      05/25/2022     07/27/2020     BMP:   Lab Results   Component Value Date     05/25/2022    POTASSIUM 3.7 05/25/2022    CHLORIDE 111 (H) 05/25/2022    CO2 27 05/25/2022    BUN 7 05/25/2022    CR 0.59 05/25/2022     (H) 05/25/2022     COAGS: No results found for: PTT, INR, FIBR  POC:   Lab Results   Component Value Date    HCG Positive (A) 10/23/2019     HEPATIC: No results found for: ALBUMIN, PROTTOTAL, ALT, AST, GGT, ALKPHOS, BILITOTAL, BILIDIRECT, ANGELI  OTHER:   Lab Results   Component Value Date    A1C 4.7 12/09/2019    BOB 9.1 05/25/2022    CRP <2.9 12/29/2020    SED 6 12/29/2020       Anesthesia Plan    ASA Status:  2   NPO Status:  NPO Appropriate    Anesthesia Type: MAC.     - Reason for MAC: immobility needed   Induction: Propofol, Intravenous.   Maintenance: TIVA.        Consents    Anesthesia Plan(s) and associated risks, benefits, and realistic alternatives discussed. Questions answered and patient/representative(s) expressed understanding.     - Discussed: Risks, Benefits and Alternatives for BOTH SEDATION and the PROCEDURE were discussed     - Discussed with:  Patient      - Extended Intubation/Ventilatory Support Discussed: No.      - Patient is DNR/DNI Status: No    Use of blood products discussed: No .     Postoperative Care    Pain management: IV analgesics.   PONV prophylaxis: Ondansetron (or other 5HT-3), Dexamethasone or Solumedrol, Background Propofol Infusion     Comments:    Other Comments: The risks and benefits of anesthesia, and the alternatives where applicable, have been discussed with the patient, and they wish to proceed.               FABIOLA Royal CRNA

## 2022-05-26 NOTE — ANESTHESIA POSTPROCEDURE EVALUATION
Patient: Brittainy N Brachtl    Procedure: Procedure(s):  DILATION AND CURETTAGE, UTERUS, USING SUCTION       Anesthesia Type:  MAC    Note:  Disposition: Outpatient   Postop Pain Control: Uneventful            Sign Out: Well controlled pain   PONV: No   Neuro/Psych: Uneventful            Sign Out: Acceptable/Baseline neuro status   Airway/Respiratory: Uneventful            Sign Out: Acceptable/Baseline resp. status   CV/Hemodynamics: Uneventful            Sign Out: Acceptable CV status   Other NRE: NONE   DID A NON-ROUTINE EVENT OCCUR? No    Event details/Postop Comments:  Pt was happy with anesthesia care.  No complications.  I will follow up with the pt if needed.           Last vitals:  Vitals Value Taken Time   BP 85/44 05/26/22 0720   Temp 97.8  F (36.6  C) 05/26/22 0720   Pulse 62 05/26/22 0720   Resp 16 05/26/22 0720   SpO2 99 % 05/26/22 0722   Vitals shown include unvalidated device data.    Electronically Signed By: FABIOLA Royal CRNA  May 26, 2022  7:23 AM

## 2022-05-26 NOTE — ED TRIAGE NOTES
Patient states she is having a miscarriage and I the last hour so so pain and bleeding have increased.      Triage Assessment     Row Name 05/25/22 2127       Triage Assessment (Adult)    Airway WDL WDL       Respiratory WDL    Respiratory WDL WDL       Skin Circulation/Temperature WDL    Skin Circulation/Temperature WDL WDL       Cardiac WDL    Cardiac WDL WDL       Peripheral/Neurovascular WDL    Peripheral Neurovascular WDL WDL       Cognitive/Neuro/Behavioral WDL    Cognitive/Neuro/Behavioral WDL WDL

## 2022-05-26 NOTE — DISCHARGE INSTRUCTIONS
[facility]   Adult Discharge Orders & Instructions     For 24 hours after surgery    Get plenty of rest.  A responsible adult must stay with you for at least 24 hours after you leave the hospital.   Do not drive or use heavy equipment.  If you have weakness or tingling, don't drive or use heavy equipment until this feeling goes away.  Do not drink alcohol.  Avoid strenuous or risky activities.  Ask for help when climbing stairs.   You may feel lightheaded.  If so, sit for a few minutes before standing.  Have someone help you get up.   You may have a slight fever. Call the doctor if your fever is over 100 F (37.7 C) (taken under the tongue) or lasts longer than 24 hours.  You may have a dry mouth, a sore throat, muscle aches or trouble sleeping.  These should go away after 24 hours.  Do not make important or legal decisions.  We don t expect you to have any problems from the surgery or treatment you had today. Just in case, here s what to do if you have pain, upset stomach (nausea), bleeding or infection:  Pain:  Take medicines your doctor has prescribed or over-the-counter medicine they have suggested. A warm pack to your abdomen may help with cramping.  Call your doctor if these methods don t work.  Pain management:  You have been prescribed prescription strength Ibuprofen to help manage your pain.  This medication does not contain Tylenol/Acetaminophen.  You may add in Tylenol/Acetaminophen.    The dosage for Tylenol/Acetaminophen is 1-2 tabs (325 mg each) every 4 to 6 hours.      You may start your prescription strength Ibuprofen at 3:00 PM today.      Copyright [employer], Licensed under [employer]  Upset stomach (nausea):  Take anti-nausea medicine approved by your doctor. Drink clear liquids like apple juice, ginger ale, broth or 7-Up. Be sure to drink enough fluids. Rest can help, too. Move to normal foods when you re ready.  Over the counter dramamine can help with  nausea as well.   Bleeding:  If noting any heavy bleeding, soaking thru one cameron pad an hour for 2-3 hours straight, call your surgeon.  If after clinic hours, go directly to Emergency Dept.  Copyright [employer], Licensed under [employer]  Fever/Infection: Please call your doctor if you have any of these signs:  Swelling  Pain gets worse  Bad-smelling fluid leaks from vagina.  Fever or chills  Call your doctor for any of the followin.  It has been over 8 to 10 hours since surgery and you are still not able to urinate (pass water).    2.  Headache for over 24 hours.    Nurse advice line: [phone]

## 2022-05-26 NOTE — OP NOTE
Procedure Date: 2022    PREOPERATIVE DIAGNOSIS:  Incomplete .    POSTOPERATIVE DIAGNOSIS:  Incomplete .    PROCEDURE PERFORMED:  Suction dilation and curettage.    SURGEON:  Axel Leon MD    ANESTHESIA:  MAC and paracervical block.    COMPLICATIONS:  None.    DRAINS/PACKS:  None.    ESTIMATED BLOOD LOSS:  10 mL.    FINDINGS:  A generous amount of products of conception within the uterus.    INDICATIONS:  Ms. Brachtl is a 31-year-old P7, who has had 3 miscarriages in the past.  She presents for continuous bleeding and cramping for greater than 1 week post determination that she had a nonviable pregnancy.    DESCRIPTION OF PROCEDURE:  After adequate MAC anesthesia, the patient was prepped and draped in the usual sterile fashion.  A speculum was placed in the vagina.  Anterior lip of the cervix grasped with a single-tooth tenaculum after an intracervical and paracervical block was done.  Uterus sounded to 10 cm, dilated to a size 8 dilator, and a size 8 suction curettage was used with a manual hand-operated suction device.  There were a number of chunks of material consistent with products of conception that were recovered.  A sharp curettage was then done x 3 followed by suction again with still products of conception followed by sharp curettage and then on additional attempts with the hand-operated suction wand, no further products of conception were recovered.    Instruments removed from the vagina and hemostasis noted.  The patient recalled from MAC anesthesia without difficulty and will be discharged to home when awake, alert, tolerating p.o. and voiding.    DISCHARGE MEDICATIONS:  Include ibuprofen and Tylenol #3.    Axel Leon MD        D: 2022   T: 2022   MT: [facility]    Name:     BRACHTL, BRITTAINY N.  MRN:      [id number]        Account:        [id number]   :      1990           Procedure Date: 2022     Document: [id number]

## 2022-05-26 NOTE — ANESTHESIA CARE TRANSFER NOTE
Patient: Brittainy N Brachtl    Procedure: Procedure(s):  DILATION AND CURETTAGE, UTERUS, USING SUCTION       Diagnosis: Miscarriage [O03.9]  Diagnosis Additional Information: No value filed.    Anesthesia Type:   MAC     Note:    Oropharynx: oropharynx clear of all foreign objects and spontaneously breathing  Level of Consciousness: awake and drowsy  Oxygen Supplementation: face mask  Level of Supplemental Oxygen (L/min / FiO2): 6  Independent Airway: airway patency satisfactory and stable  Dentition: dentition unchanged  Vital Signs Stable: post-procedure vital signs reviewed and stable  Report to RN Given: handoff report given  Patient transferred to: Phase II    Handoff Report: Identifed the Patient, Identified the Reponsible Provider, Reviewed the pertinent medical history, Discussed the surgical course, Reviewed Intra-OP anesthesia mangement and issues during anesthesia, Set expectations for post-procedure period and Allowed opportunity for questions and acknowledgement of understanding      Vitals:  Vitals Value Taken Time   BP 85/44 05/26/22 0720   Temp 97.8  F (36.6  C) 05/26/22 0720   Pulse 62 05/26/22 0720   Resp 16 05/26/22 0720   SpO2 99 % 05/26/22 0722   Vitals shown include unvalidated device data.    Electronically Signed By: FABIOLA Royal CRNA  May 26, 2022  7:22 AM

## 2022-05-27 NOTE — ED PROVIDER NOTES
Dr. Leon, OB/GYN, called me.  He has been trying to prescribe pain medication for this patient, but is unable to do so as he is driving in his vehicle.  He has been in contact with this patient and she is having inadequate pain control with OTC medications.  He would like to prescribe Dilaudid 2 mg #5 pills for pain management.  He has already spoke with her about this and we will also  her on how to take this medication and side effects to monitor for.  He did a D&C for incomplete miscarriage last night on this patient.  I agreed to do this for him and sent the prescription to this pharmacy.  He was going to call the patient back and tell her that the prescription is here for pickup.     Mark Bucio PA-C  05/26/22 0755

## 2022-05-31 LAB
PATH REPORT.COMMENTS IMP SPEC: NORMAL
PATH REPORT.FINAL DX SPEC: NORMAL
PATH REPORT.GROSS SPEC: NORMAL
PATH REPORT.MICROSCOPIC SPEC OTHER STN: NORMAL
PATH REPORT.RELEVANT HX SPEC: NORMAL
PHOTO IMAGE: NORMAL

## 2022-05-31 PROCEDURE — 88305 TISSUE EXAM BY PATHOLOGIST: CPT | Mod: 26 | Performed by: PATHOLOGY

## 2022-06-03 ENCOUNTER — HOSPITAL ENCOUNTER (EMERGENCY)
Facility: CLINIC | Age: 32
Discharge: HOME | End: 2022-06-03
Attending: EMERGENCY MEDICINE | Admitting: EMERGENCY MEDICINE
Payer: COMMERCIAL

## 2022-06-03 ENCOUNTER — APPOINTMENT (OUTPATIENT)
Dept: ULTRASOUND IMAGING | Facility: CLINIC | Age: 32
End: 2022-06-03
Attending: EMERGENCY MEDICINE
Payer: COMMERCIAL

## 2022-06-03 VITALS
BODY MASS INDEX: 23.3 KG/M2 | OXYGEN SATURATION: 99 % | RESPIRATION RATE: 16 BRPM | HEART RATE: 82 BPM | DIASTOLIC BLOOD PRESSURE: 78 MMHG | WEIGHT: 140 LBS | TEMPERATURE: 98.1 F | SYSTOLIC BLOOD PRESSURE: 109 MMHG

## 2022-06-03 DIAGNOSIS — N71.9 ENDOMETRITIS: ICD-10-CM

## 2022-06-03 LAB
ALBUMIN SERPL-MCNC: 4.3 G/DL (ref 3.4–5)
ALBUMIN UR-MCNC: NEGATIVE MG/DL
ALP SERPL-CCNC: 77 U/L (ref 40–150)
ALT SERPL W P-5'-P-CCNC: 17 U/L (ref 0–50)
ANION GAP SERPL CALCULATED.3IONS-SCNC: 6 MMOL/L (ref 3–14)
APPEARANCE UR: CLEAR
AST SERPL W P-5'-P-CCNC: 6 U/L (ref 0–45)
BACTERIA #/AREA URNS HPF: ABNORMAL /HPF
BASOPHILS # BLD AUTO: 0.1 10E3/UL (ref 0–0.2)
BASOPHILS NFR BLD AUTO: 0 %
BILIRUB SERPL-MCNC: 0.3 MG/DL (ref 0.2–1.3)
BILIRUB UR QL STRIP: NEGATIVE
BUN SERPL-MCNC: 4 MG/DL (ref 7–30)
CALCIUM SERPL-MCNC: 9.2 MG/DL (ref 8.5–10.1)
CHLORIDE BLD-SCNC: 112 MMOL/L (ref 94–109)
CO2 SERPL-SCNC: 24 MMOL/L (ref 20–32)
COLOR UR AUTO: ABNORMAL
CREAT SERPL-MCNC: 0.67 MG/DL (ref 0.52–1.04)
EOSINOPHIL # BLD AUTO: 0 10E3/UL (ref 0–0.7)
EOSINOPHIL NFR BLD AUTO: 0 %
ERYTHROCYTE [DISTWIDTH] IN BLOOD BY AUTOMATED COUNT: 13.2 % (ref 10–15)
GFR SERPL CREATININE-BSD FRML MDRD: >90 ML/MIN/1.73M2
GLUCOSE BLD-MCNC: 96 MG/DL (ref 70–99)
GLUCOSE UR STRIP-MCNC: NEGATIVE MG/DL
HCT VFR BLD AUTO: 41.4 % (ref 35–47)
HGB BLD-MCNC: 13.9 G/DL (ref 11.7–15.7)
HGB UR QL STRIP: NEGATIVE
IMM GRANULOCYTES # BLD: 0.1 10E3/UL
IMM GRANULOCYTES NFR BLD: 0 %
KETONES UR STRIP-MCNC: 5 MG/DL
LEUKOCYTE ESTERASE UR QL STRIP: NEGATIVE
LYMPHOCYTES # BLD AUTO: 2.3 10E3/UL (ref 0.8–5.3)
LYMPHOCYTES NFR BLD AUTO: 16 %
MCH RBC QN AUTO: 29.7 PG (ref 26.5–33)
MCHC RBC AUTO-ENTMCNC: 33.6 G/DL (ref 31.5–36.5)
MCV RBC AUTO: 89 FL (ref 78–100)
MONOCYTES # BLD AUTO: 0.7 10E3/UL (ref 0–1.3)
MONOCYTES NFR BLD AUTO: 5 %
NEUTROPHILS # BLD AUTO: 11.1 10E3/UL (ref 1.6–8.3)
NEUTROPHILS NFR BLD AUTO: 79 %
NITRATE UR QL: NEGATIVE
NRBC # BLD AUTO: 0 10E3/UL
NRBC BLD AUTO-RTO: 0 /100
PH UR STRIP: 6 [PH] (ref 5–7)
PLATELET # BLD AUTO: 285 10E3/UL (ref 150–450)
POTASSIUM BLD-SCNC: 3.5 MMOL/L (ref 3.4–5.3)
PROT SERPL-MCNC: 7.5 G/DL (ref 6.8–8.8)
RBC # BLD AUTO: 4.68 10E6/UL (ref 3.8–5.2)
RBC URINE: 0 /HPF
SODIUM SERPL-SCNC: 142 MMOL/L (ref 133–144)
SP GR UR STRIP: 1 (ref 1–1.03)
SQUAMOUS EPITHELIAL: 1 /HPF
UROBILINOGEN UR STRIP-MCNC: NORMAL MG/DL
WBC # BLD AUTO: 14.2 10E3/UL (ref 4–11)
WBC URINE: <1 /HPF

## 2022-06-03 PROCEDURE — 96361 HYDRATE IV INFUSION ADD-ON: CPT | Performed by: EMERGENCY MEDICINE

## 2022-06-03 PROCEDURE — 36415 COLL VENOUS BLD VENIPUNCTURE: CPT | Performed by: EMERGENCY MEDICINE

## 2022-06-03 PROCEDURE — 96367 TX/PROPH/DG ADDL SEQ IV INF: CPT | Performed by: EMERGENCY MEDICINE

## 2022-06-03 PROCEDURE — 250N000011 HC RX IP 250 OP 636: Performed by: EMERGENCY MEDICINE

## 2022-06-03 PROCEDURE — 96375 TX/PRO/DX INJ NEW DRUG ADDON: CPT | Performed by: EMERGENCY MEDICINE

## 2022-06-03 PROCEDURE — 99285 EMERGENCY DEPT VISIT HI MDM: CPT | Mod: 25 | Performed by: EMERGENCY MEDICINE

## 2022-06-03 PROCEDURE — 99285 EMERGENCY DEPT VISIT HI MDM: CPT | Performed by: EMERGENCY MEDICINE

## 2022-06-03 PROCEDURE — 80053 COMPREHEN METABOLIC PANEL: CPT | Performed by: EMERGENCY MEDICINE

## 2022-06-03 PROCEDURE — 81003 URINALYSIS AUTO W/O SCOPE: CPT | Performed by: EMERGENCY MEDICINE

## 2022-06-03 PROCEDURE — 85025 COMPLETE CBC W/AUTO DIFF WBC: CPT | Performed by: EMERGENCY MEDICINE

## 2022-06-03 PROCEDURE — 96376 TX/PRO/DX INJ SAME DRUG ADON: CPT | Performed by: EMERGENCY MEDICINE

## 2022-06-03 PROCEDURE — 76856 US EXAM PELVIC COMPLETE: CPT

## 2022-06-03 PROCEDURE — 96365 THER/PROPH/DIAG IV INF INIT: CPT | Performed by: EMERGENCY MEDICINE

## 2022-06-03 PROCEDURE — 258N000003 HC RX IP 258 OP 636: Performed by: EMERGENCY MEDICINE

## 2022-06-03 RX ORDER — KETOROLAC TROMETHAMINE 15 MG/ML
15 INJECTION, SOLUTION INTRAMUSCULAR; INTRAVENOUS ONCE
Status: COMPLETED | OUTPATIENT
Start: 2022-06-03 | End: 2022-06-03

## 2022-06-03 RX ORDER — FLUCONAZOLE 150 MG/1
150 TABLET ORAL ONCE
Qty: 2 TABLET | Refills: 0 | Status: SHIPPED | OUTPATIENT
Start: 2022-06-03 | End: 2022-06-03

## 2022-06-03 RX ORDER — DOXYCYCLINE 100 MG/1
100 CAPSULE ORAL 2 TIMES DAILY
Qty: 20 CAPSULE | Refills: 0 | Status: SHIPPED | OUTPATIENT
Start: 2022-06-03 | End: 2022-06-13

## 2022-06-03 RX ORDER — ONDANSETRON 2 MG/ML
4 INJECTION INTRAMUSCULAR; INTRAVENOUS EVERY 30 MIN PRN
Status: DISCONTINUED | OUTPATIENT
Start: 2022-06-03 | End: 2022-06-03 | Stop reason: HOSPADM

## 2022-06-03 RX ORDER — SODIUM CHLORIDE 9 MG/ML
INJECTION, SOLUTION INTRAVENOUS CONTINUOUS
Status: DISCONTINUED | OUTPATIENT
Start: 2022-06-03 | End: 2022-06-03 | Stop reason: HOSPADM

## 2022-06-03 RX ORDER — CLINDAMYCIN PHOSPHATE 900 MG/50ML
900 INJECTION, SOLUTION INTRAVENOUS EVERY 8 HOURS
Status: DISCONTINUED | OUTPATIENT
Start: 2022-06-03 | End: 2022-06-03 | Stop reason: HOSPADM

## 2022-06-03 RX ORDER — ONDANSETRON 4 MG/1
4 TABLET, ORALLY DISINTEGRATING ORAL EVERY 6 HOURS PRN
Qty: 20 TABLET | Refills: 0 | Status: SHIPPED | OUTPATIENT
Start: 2022-06-03 | End: 2022-06-06

## 2022-06-03 RX ORDER — HYDROMORPHONE HYDROCHLORIDE 1 MG/ML
0.5 INJECTION, SOLUTION INTRAMUSCULAR; INTRAVENOUS; SUBCUTANEOUS
Status: DISCONTINUED | OUTPATIENT
Start: 2022-06-03 | End: 2022-06-03 | Stop reason: HOSPADM

## 2022-06-03 RX ORDER — HYDROMORPHONE HYDROCHLORIDE 2 MG/1
2 TABLET ORAL EVERY 6 HOURS PRN
Qty: 10 TABLET | Refills: 0 | Status: SHIPPED | OUTPATIENT
Start: 2022-06-03 | End: 2022-06-07

## 2022-06-03 RX ADMIN — SODIUM CHLORIDE 1000 ML: 9 INJECTION, SOLUTION INTRAVENOUS at 16:43

## 2022-06-03 RX ADMIN — HYDROMORPHONE HYDROCHLORIDE 0.5 MG: 1 INJECTION, SOLUTION INTRAMUSCULAR; INTRAVENOUS; SUBCUTANEOUS at 19:29

## 2022-06-03 RX ADMIN — ONDANSETRON 4 MG: 2 INJECTION INTRAMUSCULAR; INTRAVENOUS at 16:42

## 2022-06-03 RX ADMIN — KETOROLAC TROMETHAMINE 15 MG: 15 INJECTION, SOLUTION INTRAMUSCULAR; INTRAVENOUS at 16:47

## 2022-06-03 RX ADMIN — CLINDAMYCIN PHOSPHATE 900 MG: 900 INJECTION, SOLUTION INTRAVENOUS at 17:21

## 2022-06-03 RX ADMIN — GENTAMICIN SULFATE 320 MG: 40 INJECTION, SOLUTION INTRAMUSCULAR; INTRAVENOUS at 18:19

## 2022-06-03 RX ADMIN — HYDROMORPHONE HYDROCHLORIDE 0.5 MG: 1 INJECTION, SOLUTION INTRAMUSCULAR; INTRAVENOUS; SUBCUTANEOUS at 16:49

## 2022-06-03 NOTE — ED TRIAGE NOTES
"  D and C last week for miscarriage and now feeling \"drained and nausea.\" with abd pain and low back pain.  Seen in U/C today and had US and was rec to ED.     "

## 2022-06-03 NOTE — DISCHARGE INSTRUCTIONS
Okay to take ibuprofen or Aleve for pain and inflammation.    Dilaudid if needed for severe pain.  No driving while on this medication.  Take stool softeners if needed.    Start antibiotics as prescribed.  Take with food but no dairy products to prevent nausea.  I recommend eating yogurt or taking probiotics while on antibiotics.  Fluconazole -take when antibiotics completed.  May repeat x1 if needed.    Zofran if needed for nausea.    Follow-up on Tuesday as scheduled with Dr. Leon.    If you have malodorous vaginal discharge, fevers, increased or uncontrolled pain, or any other worsening symptoms or concerns return promptly at any time.    I hope that you start to feel much better quickly!

## 2022-06-04 NOTE — ED PROVIDER NOTES
History     Chief Complaint   Patient presents with     Abdominal Pain     HPI  History per patient, medical records    This is a 31-year-old female, postprocedure day #8 status post D&C for incomplete miscarriage presenting with abdominal pain.  Patient notes that since the procedure she has felt unwell, basically drained and fatigued, lightheaded.  She notes lower abdominal/pelvic pain.  She is just had a little bit of light bleeding, no abnormal discharge or significant bleeding.  No fevers.  She is in urinating normally.  Normal bowel movements.  She has had slight nausea.  Patient is  with 3 miscarriages.  She has never had a .  She is been managing her pain and discomfort with ibuprofen.  She denies any chest pain, shortness of breath, cold or cough symptoms.  She is not on antibiotics or blood thinners.    Patient was seen at an outside urgent care.  They did a bedside ultrasound and were concerned for possible findings within the uterus that were abnormal and she was sent to the ED.    Allergies:  No Known Allergies    Problem List:    Patient Active Problem List    Diagnosis Date Noted     Chronic hip pain, bilateral 2020     Priority: Medium     Chronic bilateral low back pain with bilateral sciatica 2020     Priority: Medium     Greater trochanteric bursitis of both hips 2020     Priority: Medium     Migraine without aura and without status migrainosus, not intractable 2020     Priority: Medium        Past Medical History:    Past Medical History:   Diagnosis Date     Asthma      Cystic fibrosis carrier      Migraines        Past Surgical History:    Past Surgical History:   Procedure Laterality Date     CHOLECYSTECTOMY  2009     DILATION AND CURETTAGE SUCTION N/A 2022    Procedure: DILATION AND CURETTAGE, UTERUS, USING SUCTION;  Surgeon: Axel Leon MD;  Location:  OR       Family History:    Family History   Problem Relation Age of Onset      Cancer Mother      Back Pain Mother      No Known Problems Half-Brother      No Known Problems Half-Brother      Emphysema Maternal Grandmother      Bronchitis Maternal Grandmother      Cerebrovascular Disease Maternal Grandfather      No Known Problems Paternal Grandmother      No Known Problems Paternal Grandfather      No Known Problems Daughter      No Known Problems Daughter      No Known Problems Daughter        Social History:  Marital Status:   [2]  Social History     Tobacco Use     Smoking status: Light Tobacco Smoker     Packs/day: 0.50     Years: 10.00     Pack years: 5.00     Types: Cigarettes     Smokeless tobacco: Never Used     Tobacco comment: 1-3 daily   Vaping Use     Vaping Use: Never used   Substance Use Topics     Alcohol use: Not Currently     Comment: very rarely     Drug use: Never        Medications:    doxycycline hyclate (VIBRAMYCIN) 100 MG capsule  HYDROmorphone (DILAUDID) 2 MG tablet  ondansetron (ZOFRAN ODT) 4 MG ODT tab  HYDROcodone-acetaminophen (NORCO) 5-325 MG tablet  ibuprofen (ADVIL/MOTRIN) 800 MG tablet  Prenatal Vit-Fe Fumarate-FA (PRENATAL MULTIVITAMIN W/IRON) 27-0.8 MG tablet  SUMAtriptan (IMITREX) 100 MG tablet          Review of Systems   All other ROS reviewed and are negative or non-contributory except as stated in HPI.     Physical Exam   BP: 124/89  Pulse: 92  Temp: 98.1  F (36.7  C)  Resp: 16  Weight: 63.5 kg (140 lb)  SpO2: 99 %      Physical Exam  Vitals and nursing note reviewed.   Constitutional:       Appearance: She is well-developed and normal weight.      Comments: Uncomfortable appearing, young female sitting in the bed   HENT:      Head: Normocephalic.   Eyes:      General: No scleral icterus.     Extraocular Movements: Extraocular movements intact.   Cardiovascular:      Rate and Rhythm: Normal rate and regular rhythm.      Heart sounds: Normal heart sounds.   Pulmonary:      Effort: Pulmonary effort is normal.      Breath sounds: Normal breath  sounds.   Abdominal:      General: Abdomen is flat. Bowel sounds are normal.      Palpations: Abdomen is soft.      Tenderness: There is abdominal tenderness in the suprapubic area.   Genitourinary:     Comments: Deferred for ultrasound  Skin:     General: Skin is warm and dry.      Coloration: Skin is not pale.   Neurological:      General: No focal deficit present.      Mental Status: She is alert and oriented to person, place, and time.   Psychiatric:         Mood and Affect: Mood normal.         Behavior: Behavior normal.         ED Course (with Medical Decision Making)    Pt seen and examined by me.  RN and EPIC notes reviewed.      Patient status post incomplete miscarriage with D&C last week presenting with lower abdominal pain, feeling unwell.  Concern for possible retained products on outside bedside ultrasound.    I am going to do a formal ultrasound.  IV was placed for labs.  She was given Toradol for pain.  Dilaudid if needed.  Zofran.    Patient has normal comprehensive panel, CBC shows elevated white count of 14.2.  Urine without white cells, leukocyte esterase or nitrates but it does have a few bacteria.  Ultrasound unremarkable for any findings.    With the elevated white count and the pain I am going to treat her for possible endometritis.  She was given IV gentamicin and clindamycin in the ED.  I am going to send her home with an Rx for doxycycline, fluconazole, Zofran and Dilaudid.  She has an appointment with OB/GYN in 4 days.  Continue to monitor symptoms.  If she has any significant worsening, changes or concerns return promptly at any time.         Procedures    Results for orders placed or performed during the hospital encounter of 06/03/22 (from the past 24 hour(s))   CBC with platelets differential    Narrative    The following orders were created for panel order CBC with platelets differential.  Procedure                               Abnormality         Status                      ---------                               -----------         ------                     CBC with platelets and d...[[id number]]  Abnormal            Final result                 Please view results for these tests on the individual orders.   Comprehensive metabolic panel   Result Value Ref Range    Sodium 142 133 - 144 mmol/L    Potassium 3.5 3.4 - 5.3 mmol/L    Chloride 112 (H) 94 - 109 mmol/L    Carbon Dioxide (CO2) 24 20 - 32 mmol/L    Anion Gap 6 3 - 14 mmol/L    Urea Nitrogen 4 (L) 7 - 30 mg/dL    Creatinine 0.67 0.52 - 1.04 mg/dL    Calcium 9.2 8.5 - 10.1 mg/dL    Glucose 96 70 - 99 mg/dL    Alkaline Phosphatase 77 40 - 150 U/L    AST 6 0 - 45 U/L    ALT 17 0 - 50 U/L    Protein Total 7.5 6.8 - 8.8 g/dL    Albumin 4.3 3.4 - 5.0 g/dL    Bilirubin Total 0.3 0.2 - 1.3 mg/dL    GFR Estimate >90 >60 mL/min/1.73m2   CBC with platelets and differential   Result Value Ref Range    WBC Count 14.2 (H) 4.0 - 11.0 10e3/uL    RBC Count 4.68 3.80 - 5.20 10e6/uL    Hemoglobin 13.9 11.7 - 15.7 g/dL    Hematocrit 41.4 35.0 - 47.0 %    MCV 89 78 - 100 fL    MCH 29.7 26.5 - 33.0 pg    MCHC 33.6 31.5 - 36.5 g/dL    RDW 13.2 10.0 - 15.0 %    Platelet Count 285 150 - 450 10e3/uL    % Neutrophils 79 %    % Lymphocytes 16 %    % Monocytes 5 %    % Eosinophils 0 %    % Basophils 0 %    % Immature Granulocytes 0 %    NRBCs per 100 WBC 0 <1 /100    Absolute Neutrophils 11.1 (H) 1.6 - 8.3 10e3/uL    Absolute Lymphocytes 2.3 0.8 - 5.3 10e3/uL    Absolute Monocytes 0.7 0.0 - 1.3 10e3/uL    Absolute Eosinophils 0.0 0.0 - 0.7 10e3/uL    Absolute Basophils 0.1 0.0 - 0.2 10e3/uL    Absolute Immature Granulocytes 0.1 <=0.4 10e3/uL    Absolute NRBCs 0.0 10e3/uL   US Pelvic Complete w/o Transvaginal Portable    Narrative    EXAM: US PELVIC COMPLETE WITHOUT TRANSVAGINAL PORTABLE  LOCATION: [facility]  DATE/TIME: 6/3/2022 5:28 PM    INDICATION: Pelvic pain. Evaluate for retained products of conception.  COMPARISON:  None.  TECHNIQUE: Transabdominal imaging only was performed.    FINDINGS:    UTERUS: 8.8 x 4.2 x 6.2 cm. Normal in size and position with no masses.    ENDOMETRIUM: 5 mm. Normal smooth endometrium.    RIGHT OVARY: 7 x 2.5 x 2.6 cm. Normal.    LEFT OVARY: 2.8 x 1.8 x 4.4 cm. Normal.    No significant free fluid.      Impression    IMPRESSION: Unremarkable pelvic ultrasound examination. No convincing sonographic evidence for retained products of conception.         UA with Microscopic reflex to Culture    Specimen: Urine, Clean Catch   Result Value Ref Range    Color Urine Straw Colorless, Straw, Light Yellow, Yellow    Appearance Urine Clear Clear    Glucose Urine Negative Negative mg/dL    Bilirubin Urine Negative Negative    Ketones Urine 5  (A) Negative mg/dL    Specific Gravity Urine 1.002 (L) 1.003 - 1.035    Blood Urine Negative Negative    pH Urine 6.0 5.0 - 7.0    Protein Albumin Urine Negative Negative mg/dL    Urobilinogen Urine Normal Normal, 2.0 mg/dL    Nitrite Urine Negative Negative    Leukocyte Esterase Urine Negative Negative    Bacteria Urine Few (A) None Seen /HPF    RBC Urine 0 <=2 /HPF    WBC Urine <1 <=5 /HPF    Squamous Epithelials Urine 1 <=1 /HPF    Narrative    Urine Culture not indicated       Medications   0.9% sodium chloride BOLUS (0 mLs Intravenous Stopped 6/3/22 1925)   ketorolac (TORADOL) injection 15 mg (15 mg Intravenous Given 6/3/22 1647)   gentamicin (GARAMYCIN) 320 mg in sodium chloride 0.9 % 50 mL intermittent infusion (0 mg Intravenous Stopped 6/3/22 1926)       Assessments & Plan     I have reviewed the findings, diagnosis, plan and need for follow up with the patient.    Discharge Medication List as of 6/3/2022  7:37 PM      START taking these medications    Details   doxycycline hyclate (VIBRAMYCIN) 100 MG capsule Take 1 capsule (100 mg) by mouth 2 times daily for 10 days, Disp-20 capsule, R-0, E-Prescribe      fluconazole (DIFLUCAN) 150 MG tablet Take 1 tablet (150 mg) by  mouth once for 1 dose May repeat after 3 days if needed, Disp-2 tablet, R-0, E-Prescribe      HYDROmorphone (DILAUDID) 2 MG tablet Take 1 tablet (2 mg) by mouth every 6 hours as needed for pain, Disp-10 tablet, R-0, E-Prescribe             Final diagnoses:   Endometritis     Disposition: Patient discharged home in stable condition.  Plan as above.  Return for concerns.   Note: Chart documentation done in part with Dragon Voice Recognition software. Although reviewed after completion, some word and grammatical errors may remain.     6/3/2022   [facility] EMERGENCY DEPT     Linda Snow MD  06/04/22 0104

## 2022-06-07 ENCOUNTER — NURSE TRIAGE (OUTPATIENT)
Dept: FAMILY MEDICINE | Facility: CLINIC | Age: 32
End: 2022-06-07

## 2022-06-07 ENCOUNTER — OFFICE VISIT (OUTPATIENT)
Dept: OBGYN | Facility: OTHER | Age: 32
End: 2022-06-07
Payer: COMMERCIAL

## 2022-06-07 ENCOUNTER — OFFICE VISIT (OUTPATIENT)
Dept: FAMILY MEDICINE | Facility: CLINIC | Age: 32
End: 2022-06-07
Payer: COMMERCIAL

## 2022-06-07 VITALS
OXYGEN SATURATION: 97 % | RESPIRATION RATE: 20 BRPM | SYSTOLIC BLOOD PRESSURE: 114 MMHG | HEIGHT: 65 IN | HEART RATE: 76 BPM | BODY MASS INDEX: 23.72 KG/M2 | TEMPERATURE: 98.6 F | DIASTOLIC BLOOD PRESSURE: 64 MMHG | WEIGHT: 142.4 LBS

## 2022-06-07 VITALS
HEIGHT: 65 IN | OXYGEN SATURATION: 100 % | BODY MASS INDEX: 23.91 KG/M2 | WEIGHT: 143.5 LBS | HEART RATE: 78 BPM | SYSTOLIC BLOOD PRESSURE: 117 MMHG | DIASTOLIC BLOOD PRESSURE: 73 MMHG

## 2022-06-07 DIAGNOSIS — N92.1 MENORRHAGIA WITH IRREGULAR CYCLE: Primary | ICD-10-CM

## 2022-06-07 DIAGNOSIS — D72.829 LEUKOCYTOSIS, UNSPECIFIED TYPE: ICD-10-CM

## 2022-06-07 DIAGNOSIS — Z01.818 PREOP GENERAL PHYSICAL EXAM: Primary | ICD-10-CM

## 2022-06-07 DIAGNOSIS — T50.905A ADVERSE EFFECT OF DRUG, INITIAL ENCOUNTER: ICD-10-CM

## 2022-06-07 DIAGNOSIS — N92.6 IRREGULAR MENSTRUAL CYCLE: ICD-10-CM

## 2022-06-07 DIAGNOSIS — R10.2 PELVIC PAIN IN FEMALE: ICD-10-CM

## 2022-06-07 DIAGNOSIS — O03.5: ICD-10-CM

## 2022-06-07 DIAGNOSIS — Z30.2 ENCOUNTER FOR STERILIZATION: ICD-10-CM

## 2022-06-07 DIAGNOSIS — O03.4 INCOMPLETE ABORTION: ICD-10-CM

## 2022-06-07 LAB
B-HCG SERPL-ACNC: 13 IU/L (ref 0–5)
ERYTHROCYTE [DISTWIDTH] IN BLOOD BY AUTOMATED COUNT: 13.4 % (ref 10–15)
HCT VFR BLD AUTO: 41.8 % (ref 35–47)
HGB BLD-MCNC: 13.9 G/DL (ref 11.7–15.7)
MCH RBC QN AUTO: 29.5 PG (ref 26.5–33)
MCHC RBC AUTO-ENTMCNC: 33.3 G/DL (ref 31.5–36.5)
MCV RBC AUTO: 89 FL (ref 78–100)
PLATELET # BLD AUTO: 290 10E3/UL (ref 150–450)
RBC # BLD AUTO: 4.71 10E6/UL (ref 3.8–5.2)
WBC # BLD AUTO: 11.7 10E3/UL (ref 4–11)

## 2022-06-07 PROCEDURE — 84702 CHORIONIC GONADOTROPIN TEST: CPT | Performed by: FAMILY MEDICINE

## 2022-06-07 PROCEDURE — 85027 COMPLETE CBC AUTOMATED: CPT | Performed by: FAMILY MEDICINE

## 2022-06-07 PROCEDURE — 99214 OFFICE O/P EST MOD 30 MIN: CPT | Mod: 24 | Performed by: OBSTETRICS & GYNECOLOGY

## 2022-06-07 PROCEDURE — 36415 COLL VENOUS BLD VENIPUNCTURE: CPT | Performed by: FAMILY MEDICINE

## 2022-06-07 PROCEDURE — 99214 OFFICE O/P EST MOD 30 MIN: CPT | Performed by: FAMILY MEDICINE

## 2022-06-07 RX ORDER — SCOLOPAMINE TRANSDERMAL SYSTEM 1 MG/1
1 PATCH, EXTENDED RELEASE TRANSDERMAL ONCE
Status: CANCELLED | OUTPATIENT
Start: 2022-06-07 | End: 2022-06-07

## 2022-06-07 RX ORDER — ACETAMINOPHEN 325 MG/1
975 TABLET ORAL ONCE
Status: CANCELLED | OUTPATIENT
Start: 2022-06-07 | End: 2022-06-07

## 2022-06-07 ASSESSMENT — PAIN SCALES - GENERAL: PAINLEVEL: SEVERE PAIN (6)

## 2022-06-07 NOTE — PROGRESS NOTES
SUBJECTIVE:      I spent a total of 35 minutes on the care of Azalia on the day of service including 25 minutes of face-to-face time with the remainder of time spent in chart review, care coordination, documentation on the day of service.                                                 Brittainy N Brachtl is a 31 year old female who presents to clinic today for the following health issue(s):  Patient presents with:  Follow Up  Miscarriage    Azalia presents for follow-up from her D&C miscarriage and at this point she is also wanting to have a hysterectomy.  Prior to her getting pregnant having unprotected intercourse she was planning for hysterectomy based on circumstances related to chronic pelvic pain and menorrhagia.  Her uterus is normal size most recent ultrasound was for miscarriage.    Reviewed her previous encounters for management of irregular periods as well as pelvic pain.  She has a concern regarding endometriosis but no documentation to support that.  She has had number of complicated issues in the past related to heavy bleeding.  See my previous note from April 2022.    Azalia has had a protracted recuperation from her D&C and I explained that I am concerned regarding having a laparoscopic procedure whether that was a tubal or now considering hysterectomy.  There were number of interactions over the phone as well as emergency, urgent care visits and the recovery from her D&C.  She also at this point has what appears to be a solar rash on her hands from being on antibiotics but more impressively has a rash on her abdomen that started the day after she was seen in the urgent care at the emergency room.  There she received both IV antibiotics and oral antibiotics.  I have asked her primary care to see her regarding these conditions as soon as possible and a message was sent to Dr. Casillas.    Azalia and her significant other and I had a conversation regarding expectations from surgery.  We  discussed risks and benefits of surgery we discussed risks of bleeding and trauma and anesthesia and infection.  She and her significant other asked appropriate questions.  I drew diagrams of the uterus and explained its anatomy in relationship to her nerve supply and bladder and she comes prepared wanting to have a subtotal/partial hysterectomy.  We discussed the risks of postoperative bleeding and hormonally active endometrial tissue in the cervix that potentially could lead to trachelectomy however this is uncommon at roughly 2 and 100 cases.  She would also need to have Pap smears in the future.    Assessment/plan:  Azalia is a 31-year-old whose had 4 children and multiple miscarriages.  Suggest she be scheduled for laparoscopy bilateral salpingectomy, subtotal hysterectomy.  She will need COVID test as well as preop with primary care.  Return to clinic for evaluation of her rash.  She will also need consents for hysterectomy and sterilization.    Patient's last menstrual period was 2022..     Patient is sexually active, .  Using none for contraception.    reports that she has been smoking cigarettes. She has a 5.00 pack-year smoking history. She has never used smokeless tobacco.  Tobacco Cessation Action Plan: Information offered: Patient not interested at this time  STD testing offered?  Declined    Health maintenance updated:  yes    Today's PHQ-2 Score:   PHQ-2 (  [employer]) 3/19/2022   Q1: Little interest or pleasure in doing things 0   Q2: Feeling down, depressed or hopeless 0   PHQ-2 Score 0   PHQ-2 Total Score (12-17 Years)- Positive if 3 or more points; Administer PHQ-A if positive -   Q1: Little interest or pleasure in doing things Not at all   Q2: Feeling down, depressed or hopeless Not at all   PHQ-2 Score 0     Today's PHQ-9 Score:   PHQ-9 SCORE 2020   PHQ-9 Total Score MyChart 5 (Mild depression)   PHQ-9 Total Score 5     Today's RADHA-7 Score: No flowsheet data found.    Problem  list and histories reviewed & adjusted, as indicated.  Additional history: as documented.    Patient Active Problem List   Diagnosis     Migraine without aura and without status migrainosus, not intractable     Chronic bilateral low back pain with bilateral sciatica     Greater trochanteric bursitis of both hips     Chronic hip pain, bilateral     Past Surgical History:   Procedure Laterality Date     CHOLECYSTECTOMY  07/2009     DILATION AND CURETTAGE SUCTION N/A 5/26/2022    Procedure: DILATION AND CURETTAGE, UTERUS, USING SUCTION;  Surgeon: Axel Leon MD;  Location:  OR      Social History     Tobacco Use     Smoking status: Light Tobacco Smoker     Packs/day: 0.50     Years: 10.00     Pack years: 5.00     Types: Cigarettes     Smokeless tobacco: Never Used     Tobacco comment: 1-3 daily   Substance Use Topics     Alcohol use: Not Currently     Comment: very rarely      Problem (# of Occurrences) Relation (Name,Age of Onset)    Back Pain (1) Mother (Catherine)    Bronchitis (1) Maternal Grandmother    Cancer (1) Mother (Catherine)    Cerebrovascular Disease (1) Maternal Grandfather    Emphysema (1) Maternal Grandmother    No Known Problems (7) Half-Brother (Bienvenido), Half-Brother (Chris), Paternal Grandmother, Paternal Grandfather, Daughter (Maria Del Carmen), Daughter (Brittani), Daughter (Georgina)            Current Outpatient Medications   Medication Sig     doxycycline hyclate (VIBRAMYCIN) 100 MG capsule Take 1 capsule (100 mg) by mouth 2 times daily for 10 days     HYDROcodone-acetaminophen (NORCO) 5-325 MG tablet Take 1-2 tablets by mouth daily as needed for severe pain (severe headache)     ibuprofen (ADVIL/MOTRIN) 800 MG tablet Take 1 tablet (800 mg) by mouth every 6 hours as needed for other (mild and/or inflammatory pain)     Prenatal Vit-Fe Fumarate-FA (PRENATAL MULTIVITAMIN W/IRON) 27-0.8 MG tablet Take 1 tablet by mouth daily     SUMAtriptan (IMITREX) 100 MG tablet Take 1 tablet (100 mg) by mouth at onset of  headache for migraine May repeat in 2 hours. Max 4 tablets/24 hours. (Patient not taking: Reported on 5/16/2022)     No current facility-administered medications for this visit.     No Known Allergies        OBJECTIVE:     LMP 03/12/2022   There is no height or weight on file to calculate BMI.    Exam:  Deferred    In-Clinic Test Results:  No results found for this or any previous visit (from the past 24 hour(s)).    ASSESSMENT/PLAN:                                                      See above    Axel Leon MD  [facility]

## 2022-06-07 NOTE — PATIENT INSTRUCTIONS
Preparing for Your Surgery  Getting started  A nurse will call you to review your health history and instructions. They will give you an arrival time based on your scheduled surgery time. Please be ready to share:    Your doctor's clinic name and phone number    Your medical, surgical and anesthesia history    A list of allergies and sensitivities    A list of medicines, including herbal treatments and over-the-counter drugs    Whether the patient has a legal guardian (ask how to send us the papers in advance)  Please tell us if you're pregnant--or if there's any chance you might be pregnant. Some surgeries may injure a fetus (unborn baby), so they require a pregnancy test. Surgeries that are safe for a fetus don't always need a test, and you can choose whether to have one.   If you have a child who's having surgery, please ask for a copy of Preparing for Your Child's Surgery.    Preparing for surgery    Within 30 days of surgery: Have a pre-op exam (sometimes called an H&P, or History and Physical). This can be done at a clinic or pre-operative center.  ? If you're having a , you may not need this exam. Talk to your care team.    At your pre-op exam, talk to your care team about all medicines you take. If you need to stop any medicines before surgery, ask when to start taking them again.  ? We do this for your safety. Many medicines can make you bleed too much during surgery. Some change how well surgery (anesthesia) drugs work.    Call your insurance company to let them know you're having surgery. (If you don't have insurance, call [phone].)    Call your clinic if there's any change in your health. This includes signs of a cold or flu (sore throat, runny nose, cough, rash, fever). It also includes a scrape or scratch near the surgery site.    If you have questions on the day of surgery, call your hospital or surgery center.  COVID testing  You may need to be tested for COVID-19 before having  surgery. If so, we will give you instructions.  Eating and drinking guidelines  For your safety: Unless your surgeon tells you otherwise, follow the guidelines below.    Eat and drink as usual until 8 hours before surgery. After that, no food or milk.    Drink clear liquids until 2 hours before surgery. These are liquids you can see through, like water, Gatorade and Propel Water. You may also have black coffee and tea (no cream or milk).    Nothing by mouth within 2 hours of surgery. This includes gum, candy and breath mints.    If you drink alcohol: Stop drinking it the night before surgery.    If your care team tells you to take medicine on the morning of surgery, it's okay to take it with a sip of water.  Preventing infection    Shower or bathe the night before and morning of your surgery. Follow the instructions your clinic gave you. (If no instructions, use regular soap.)    Don't shave or clip hair near your surgery site. We'll remove the hair if needed.    Don't smoke or vape the morning of surgery. You may chew nicotine gum up to 2 hours before surgery. A nicotine patch is okay.  ? Note: Some surgeries require you to completely quit smoking and nicotine. Check with your surgeon.    Your care team will make every effort to keep you safe from infection. We will:  ? Clean our hands often with soap and water (or an alcohol-based hand rub).  ? Clean the skin at your surgery site with a special soap that kills germs.  ? Give you a special gown to keep you warm. (Cold raises the risk of infection.)  ? Wear special hair covers, masks, gowns and gloves during surgery.  ? Give antibiotic medicine, if prescribed. Not all surgeries need antibiotics.  What to bring on the day of surgery    Photo ID and insurance card    Copy of your health care directive, if you have one    Glasses and hearing aides (bring cases)  ? You can't wear contacts during surgery    Inhaler and eye drops, if you use them (tell us about these when  you arrive)    CPAP machine or breathing device, if you use them    A few personal items, if spending the night    If you have . . .  ? A pacemaker, ICD (cardiac defibrillator) or other implant: Bring the ID card.  ? An implanted stimulator: Bring the remote control.  ? A legal guardian: Bring a copy of the certified (court-stamped) guardianship papers.  Please remove any jewelry, including body piercings. Leave jewelry and other valuables at home.  If you're going home the day of surgery    You must have a responsible adult drive you home. They should stay with you overnight as well.    If you don't have someone to stay with you, and you aren't safe to go home alone, we may keep you overnight. Insurance often won't pay for this.  After surgery  If it's hard to control your pain or you need more pain medicine, please call your surgeon's office.  Questions?   If you have any questions for your care team, list them here: _________________________________________________________________________________________________________________________________________________________________________ ____________________________________ ____________________________________ ____________________________________  For informational purposes only. Not to replace the advice of your health care provider. Copyright   2003, 2019 [facility]. All rights reserved. Clinically reviewed by Magi De La Vega MD. [employer] [id number] - REV 07/21.

## 2022-06-07 NOTE — PROGRESS NOTES
Assessment & Plan     Adverse effect of drug, initial encounter, rash  Incomplete   Leukocytosis, unspecified type   with endometritis       I suspect this is a drug reaction.  The type of rash is very consistent with that and nothing else.  There are no vesicles, this does not appear infectious.  I do suspect the erythema on her hands is sunburn from sun sensitivity from being on doxycycline.  I advised her to stay out of the sun or to be very careful with sun protection for the remainder of her antibiotics course.  I did not advise her to stop the antibiotics, as I think these are important to finish treating her infection.  She is hoping to undergo surgery in the near future for hysterectomy and is waiting to recover from this infection.  I did ask her to have a repeat hCG today since she has not had that rechecked since her D&C.  I expect it will be down significantly and hopefully 0 or close.  I did explain to her what symptoms would warrant following up or stopping her antibiotics, showed her images of more severe drug reactions such as Harrison-Dyllan syndrome.    Review of prior external note(s) from - OB/GYN Dr. newton and ED notes  30 minutes spent on the date of the encounter doing chart review, history and exam, documentation and further activities per the note      No follow-ups on file.    Rosario Casillas MD  [facility]    Isidro Nelson is a 31 year old who presents for the following health issues     Rash  Associated symptoms include a rash.   History of Present Illness       Reason for visit:  Rash  Symptom onset:  1-3 days ago  Symptoms include:  Rash redness itchy painful to the touch  Symptom intensity:  Moderate  Symptom progression:  Staying the same  Had these symptoms before:  No  What makes it worse:  Hot or cold pressure  What makes it better:  No    She eats 2-3 servings of fruits and vegetables daily.She consumes 2 sweetened  "beverage(s) daily.She exercises with enough effort to increase her heart rate 20 to 29 minutes per day.  She exercises with enough effort to increase her heart rate 5 days per week.   She is taking medications regularly.             Review of Systems   Skin: Positive for rash.      Constitutional, HEENT, cardiovascular, pulmonary, gi and gu systems are negative, except as otherwise noted.      Objective    /64 (BP Location: Right arm, Patient Position: Chair, Cuff Size: Adult Regular)   Pulse 76   Temp 98.6  F (37  C) (Temporal)   Resp 20   Ht 1.651 m (5' 5\")   Wt 64.6 kg (142 lb 6.4 oz)   LMP 03/12/2022   SpO2 97%   BMI 23.70 kg/m    Body mass index is 23.7 kg/m .  Physical Exam   Vitals noted.  Patient alert, oriented, and in no acute distress.   See separate note for full preop exam.   CV:  RRR without murmur.   Respiratory:  Lungs clear to auscultation bilaterally. No wheeze.   Skin shows a very faint tiny red papular rash scattered on her back and more so on her abdomen.  On the right lower quadrant essentially over the groin and just above the groin she has a more confluent erythematous large area approximately 15 cm in size.  It is no warmer than the other side and it is not palpable.  Up higher on the abdomen, right upper quadrant she has slightly more sparse but similar appearing rash, red tiny papular rash.  She does have some tinea versicolor on the upper back.  Couple insect bites on the lower extremities.  No significant rash on the lower extremities or upper extremities.          "

## 2022-06-07 NOTE — PROGRESS NOTES
[address] [address]  Phone: [phone]  Fax: [phone]  Primary Provider: Rosario Mendez  Pre-op Performing Provider: ROSARIO MENDEZ    PREOPERATIVE EVALUATION:  Today's date: 2022    Brittainy N Brachtl is a 31 year old female who presents for a preoperative evaluation.    Surgical Information:  Surgery/Procedure: partial hysterectomy   Surgery Location: [facility]  Surgeon: Dr Leon  Surgery Date: TBD  Time of Surgery: TBD  Where patient plans to recover: At home with family  Fax number for surgical facility: Note does not need to be faxed, will be available electronically in Epic.    Type of Anesthesia Anticipated: General    Assessment & Plan     The proposed surgical procedure is considered INTERMEDIATE risk.      ICD-10-CM    1. Preop general physical exam  Z01.818    2. Adverse effect of drug, initial encounter  T50.905A    3. Encounter for sterilization  Z30.2    4. Incomplete   O03.4 HCG Quantitative Pregnancy, Blood (ELR432)   5. Leukocytosis, unspecified type  D72.829 CBC with platelets     CBC with platelets   6.  with endometritis  O03.5              Risks and Recommendations:  The patient has the following additional risks and recommendations for perioperative complications:   - No identified additional risk factors other than previously addressed    Medication Instructions:  she will not be on any chronic daily medications    RECOMMENDATION:  APPROVAL GIVEN to proceed with proposed procedure, without further diagnostic evaluation.    Review of the result(s) of each unique test - pelvic ultrasound    30 minutes spent on the date of the encounter doing chart review, history and exam, documentation and further activities per the note    Subjective     HPI related to upcoming procedure: She recently underwent her 3rd miscarriage followed by D&C.  She is requesting permanent sterilization with  partial hysterectomy. She is dealing with suspected uterine infection and is on antibiotics. She will be pursuing surgery after recovery from infection.       Preop Questions 6/7/2022   1. Have you ever had a heart attack or stroke? No   2. Have you ever had surgery on your heart or blood vessels, such as a stent placement, a coronary artery bypass, or surgery on an artery in your head, neck, heart, or legs? No   3. Do you have chest pain with activity? No   4. Do you have a history of  heart failure? No   5. Do you currently have a cold, bronchitis or symptoms of other infection? No   6. Do you have a cough, shortness of breath, or wheezing? No   7. Do you or anyone in your family have previous history of blood clots? No   8. Do you or does anyone in your family have a serious bleeding problem such as prolonged bleeding following surgeries or cuts? No   9. Have you ever had problems with anemia or been told to take iron pills? No   10. Have you had any abnormal blood loss such as black, tarry or bloody stools, or abnormal vaginal bleeding? No   11. Have you ever had a blood transfusion? No   12. Are you willing to have a blood transfusion if it is medically needed before, during, or after your surgery? Yes   13. Have you or any of your relatives ever had problems with anesthesia? No   14. Do you have sleep apnea, excessive snoring or daytime drowsiness? No   15. Do you have any artifical heart valves or other implanted medical devices like a pacemaker, defibrillator, or continuous glucose monitor? No   16. Do you have artificial joints? No   17. Are you allergic to latex? No   18. Is there any chance that you may be pregnant? No     Health Care Directive:  Patient does not have a Health Care Directive or Living Will: FULL CODE    Preoperative Review of :   reviewed - controlled substances reflected in medication list.    Status of Chronic Conditions:  See problem list for active medical problems.  Problems  all longstanding and stable, except as noted/documented.  See ROS for pertinent symptoms related to these conditions.      Review of Systems  CONSTITUTIONAL: NEGATIVE for fever, chills, change in weight  INTEGUMENTARY/SKIN: NEGATIVE for worrisome rashes, moles or lesions  EYES: NEGATIVE for vision changes or irritation  ENT/MOUTH: NEGATIVE for ear, mouth and throat problems  RESP: NEGATIVE for significant cough or SOB  CV: NEGATIVE for chest pain, palpitations or peripheral edema  GI: NEGATIVE for nausea, abdominal pain, heartburn, or change in bowel habits  : NEGATIVE for frequency, dysuria, or hematuria  MUSCULOSKELETAL: NEGATIVE for significant arthralgias or myalgia  NEURO: NEGATIVE for weakness, dizziness or paresthesias  ENDOCRINE: NEGATIVE for temperature intolerance, skin/hair changes  HEME: NEGATIVE for bleeding problems  PSYCHIATRIC: NEGATIVE for changes in mood or affect    Patient Active Problem List    Diagnosis Date Noted     Chronic hip pain, bilateral 12/30/2020     Priority: Medium     Chronic bilateral low back pain with bilateral sciatica 12/02/2020     Priority: Medium     Greater trochanteric bursitis of both hips 12/02/2020     Priority: Medium     Migraine without aura and without status migrainosus, not intractable 07/09/2020     Priority: Medium      Past Medical History:   Diagnosis Date     Asthma      Cystic fibrosis carrier      Migraines      Past Surgical History:   Procedure Laterality Date     CHOLECYSTECTOMY  07/2009     DILATION AND CURETTAGE SUCTION N/A 5/26/2022    Procedure: DILATION AND CURETTAGE, UTERUS, USING SUCTION;  Surgeon: Axel Leon MD;  Location: [facility] OR     Current Outpatient Medications   Medication Sig Dispense Refill     doxycycline hyclate (VIBRAMYCIN) 100 MG capsule Take 1 capsule (100 mg) by mouth 2 times daily for 10 days 20 capsule 0     HYDROcodone-acetaminophen (NORCO) 5-325 MG tablet Take 1-2 tablets by mouth daily as needed for severe pain  "(severe headache) 10 tablet 0     ibuprofen (ADVIL/MOTRIN) 800 MG tablet Take 1 tablet (800 mg) by mouth every 6 hours as needed for other (mild and/or inflammatory pain) 10 tablet 0     Prenatal Vit-Fe Fumarate-FA (PRENATAL MULTIVITAMIN W/IRON) 27-0.8 MG tablet Take 1 tablet by mouth daily (Patient not taking: Reported on 6/7/2022)       SUMAtriptan (IMITREX) 100 MG tablet Take 1 tablet (100 mg) by mouth at onset of headache for migraine May repeat in 2 hours. Max 4 tablets/24 hours. (Patient not taking: Reported on 6/7/2022) 9 tablet 3       No Known Allergies     Social History     Tobacco Use     Smoking status: Light Tobacco Smoker     Packs/day: 0.50     Years: 10.00     Pack years: 5.00     Types: Cigarettes     Smokeless tobacco: Never Used     Tobacco comment: 1-3 daily   Substance Use Topics     Alcohol use: Not Currently     Comment: very rarely     Family History   Problem Relation Age of Onset     Cancer Mother      Back Pain Mother      No Known Problems Half-Brother      No Known Problems Half-Brother      Emphysema Maternal Grandmother      Bronchitis Maternal Grandmother      Cerebrovascular Disease Maternal Grandfather      No Known Problems Paternal Grandmother      No Known Problems Paternal Grandfather      No Known Problems Daughter      No Known Problems Daughter      No Known Problems Daughter      History   Drug Use Unknown         Objective     /64 (BP Location: Right arm, Patient Position: Chair, Cuff Size: Adult Regular)   Pulse 76   Temp 98.6  F (37  C) (Temporal)   Resp 20   Ht 1.651 m (5' 5\")   Wt 64.6 kg (142 lb 6.4 oz)   LMP 03/12/2022   SpO2 97%   BMI 23.70 kg/m      Physical Exam    GENERAL APPEARANCE: healthy, alert and no distress     EYES: EOMI, PERRL     HENT: ear canals and TM's normal and nose and mouth without ulcers or lesions     NECK: no adenopathy, no asymmetry, masses, or scars and thyroid normal to palpation     RESP: lungs clear to auscultation - no " rales, rhonchi or wheezes     CV: regular rates and rhythm, normal S1 S2, no S3 or S4 and no murmur, click or rub     ABDOMEN:  soft, nontender, no HSM or masses and bowel sounds normal     MS: extremities normal- no gross deformities noted, no evidence of inflammation in joints, FROM in all extremities.     SKIN: no suspicious lesions , Skin shows a very faint tiny red papular rash scattered on her back and more so on her abdomen.  On the right lower quadrant essentially over the groin and just above the groin she has a more confluent erythematous large area approximately 15 cm in size.  It is no warmer than the other side and it is not palpable.  Up higher on the abdomen, right upper quadrant she has slightly more sparse but similar appearing rash, red tiny papular rash.  She does have some tinea versicolor on the upper back.  Couple insect bites on the lower extremities.  No significant rash on the lower extremities or upper extremities.     NEURO: Normal strength and tone, sensory exam grossly normal, mentation intact and speech normal     PSYCH: mentation appears normal. and affect normal/bright     LYMPHATICS: No cervical adenopathy    Recent Labs   Lab Test 06/03/22  1634 05/25/22  2154   HGB 13.9 12.8    251    142   POTASSIUM 3.5 3.7   CR 0.67 0.59        Diagnostics:  Results for orders placed or performed in visit on 06/07/22   HCG Quantitative Pregnancy, Blood (MBC124)     Status: Abnormal   Result Value Ref Range    hCG Quantitative 13 (H) 0 - 5 IU/L   CBC with platelets     Status: Abnormal   Result Value Ref Range    WBC Count 11.7 (H) 4.0 - 11.0 10e3/uL    RBC Count 4.71 3.80 - 5.20 10e6/uL    Hemoglobin 13.9 11.7 - 15.7 g/dL    Hematocrit 41.8 35.0 - 47.0 %    MCV 89 78 - 100 fL    MCH 29.5 26.5 - 33.0 pg    MCHC 33.3 31.5 - 36.5 g/dL    RDW 13.4 10.0 - 15.0 %    Platelet Count 290 150 - 450 10e3/uL       No EKG required for low risk surgery (cataract, skin procedure, breast biopsy,  etc).    Revised Cardiac Risk Index (RCRI):  The patient has the following serious cardiovascular risks for perioperative complications:   - No serious cardiac risks = 0 points     RCRI Interpretation: 0 points: Class I (very low risk - 0.4% complication rate)           Signed Electronically by: Rosario Casillas MD  Copy of this evaluation report is provided to requesting physician.

## 2022-06-07 NOTE — TELEPHONE ENCOUNTER
Dr. Leon has asked me to see mOar for a couple rashes. Please call her and triage, I will work her in if needed.   Rosario Casillas MD

## 2022-06-07 NOTE — TELEPHONE ENCOUNTER
Patient is scheduled today with PCP for quick evaluation.    Reason for Disposition    Patient wants to be seen    Looks like a boil, infected sore, deep ulcer, or other infected rash (spreading redness, pus)    Localized rash is very painful (no fever)    Additional Information    Negative: Sounds like a life-threatening emergency to the triager    Negative: Possible contact with poison ivy or oak    Negative: Insect bite(s) suspected    Negative: Athlete's Foot suspected (i.e., itchy rash between the toes)    Negative: Jock Itch suspected (i.e., itchy rash on inner thighs near genital area)    Negative: Wound infection suspected (i.e., pain, spreading redness, or pus; in a cut, puncture, scrape or sutured wound)    Negative: Rash of external female genital area (vulva)    Negative: Rash of penis or scrotum    Negative: Small spot, skin growth, or mole    Negative: Fever and localized purple or blood-colored spots or dots that are not from injury or friction    Negative: Fever and localized rash is very painful    Negative: Patient sounds very sick or weak to the triager    Protocols used: RASH OR REDNESS - [id number]

## 2022-06-08 ENCOUNTER — MYC MEDICAL ADVICE (OUTPATIENT)
Dept: FAMILY MEDICINE | Facility: CLINIC | Age: 32
End: 2022-06-08
Payer: COMMERCIAL

## 2022-06-08 ENCOUNTER — TELEPHONE (OUTPATIENT)
Dept: OBGYN | Facility: OTHER | Age: 32
End: 2022-06-08

## 2022-06-08 DIAGNOSIS — Z01.812 ENCOUNTER FOR PREPROCEDURE SCREENING LABORATORY TESTING FOR COVID-19: Primary | ICD-10-CM

## 2022-06-08 DIAGNOSIS — Z11.52 ENCOUNTER FOR PREPROCEDURE SCREENING LABORATORY TESTING FOR COVID-19: Primary | ICD-10-CM

## 2022-06-08 NOTE — TELEPHONE ENCOUNTER
Associated Diagnoses    Menorrhagia with irregular cycle [N92.1]  - Primary       Pelvic pain in female [R10.2]       Irregular menstrual cycle [N92.6]             Procedure Laterality Anesthesia Region   HYSTERECTOMY, TOTAL, ROBOT-ASSISTED subtotal Bilateral salpingectomy Bilateral General       Requested date:    Location:  OR   Patient class: Outpatient      Pre-op diagnoses:  Menorrhagia with irregular cycle    Pelvic pain in female    Irregular menstrual cycle     Scheduling Instructions    Additional Instructions for the Case   Surgical Assistant: Yes: Clayton Solorzano MD   Multi Surgeon Case:  No   H&P:  Pre-op options: Pre-op options: PCP   Post-op:  2 weeks   Vendor: No   Surgical time needed: Average   ERAS patient: No     Sterilization consent needed:  YES:   Will also need hysterectomy consent   Pre-surgery consult needed:  Before the surgery day please schedule a 30 minute phone call  to discuss details of  the surgery, recovery and consent or in person if patient prefers.     Omar  will need Josr test, Primary care MD preop.            SURGERY SCHEDULING AND PRECERTIFICATION    Medical Record Number: [id number]  Brittainy N Brachtl  YOB: 1990   Phone: [phone] (home)   Primary Provider: Rosario Casillas    Reason for Admit:  ICD-10 CODE:    Menorrhagia with irregular cycle [N92.1]  - Primary       Pelvic pain in female [R10.2]       Irregular menstrual cycle [N92.6]           Surgeon: Axel Leon MD  Surgical Procedure: HYSTERECTOMY, TOTAL, ROBOT-ASSISTED subtotal Bilateral salpingectomy    Date of Surgery  Time of Surgery 12:15 p.m.  Surgery to be performed at:  [facility]  Status: Outpatient  Type of Anesthesia Anticipated: General    Sterilization consent:  To be signed at presurgical consult .    Pre-Op: On  with Dr. Garcia  Pre-surg consult on  with Dr. Leon  COVID testin/25 [facility] on   Post-Op:  2  jacob on 8/15 with Dr. Leon    Pre-certification routed to [employer]:  Auto routes via Case Request    Surgery packet mailed to patient's home address: Yes  Patient instructed NPO 12 hours prior to surgery, arrive according to the time the nurse gives patient when called prior to surgery, must have a .  Patient understood and agrees to the plan.      Requestor:  Precious Morrison     Location:  [place] [address]

## 2022-06-09 NOTE — TELEPHONE ENCOUNTER
Patient stated she is feeling better today. Advised her to reach out if things worsen or she develops concerning symptoms.     Sandra Woodard, DASHAN, RN

## 2022-06-15 ENCOUNTER — MYC MEDICAL ADVICE (OUTPATIENT)
Dept: FAMILY MEDICINE | Facility: CLINIC | Age: 32
End: 2022-06-15

## 2022-06-16 ENCOUNTER — VIRTUAL VISIT (OUTPATIENT)
Dept: FAMILY MEDICINE | Facility: CLINIC | Age: 32
End: 2022-06-16
Payer: COMMERCIAL

## 2022-06-16 ENCOUNTER — MYC MEDICAL ADVICE (OUTPATIENT)
Dept: FAMILY MEDICINE | Facility: CLINIC | Age: 32
End: 2022-06-16

## 2022-06-16 DIAGNOSIS — K08.89 PAIN, DENTAL: Primary | ICD-10-CM

## 2022-06-16 PROCEDURE — 99213 OFFICE O/P EST LOW 20 MIN: CPT | Mod: 95 | Performed by: FAMILY MEDICINE

## 2022-06-16 NOTE — PROGRESS NOTES
"Omar is a 31 year old who is being evaluated via a billable video visit.      How would you like to obtain your AVS? MyChart  If the video visit is dropped, the invitation should be resent by: Text to cell phone: [phone]  Will anyone else be joining your video visit? No        Assessment & Plan     Pain, dental  Patient with dental pain following procedure.  She will start the amoxicillin that he prescribed and continue with the doxycycline.  Follow-up with her dentist for further cares call if worse she can use Tylenol and/or ibuprofen if needed for her pain                   No follow-ups on file.    Arias Lincoln MD  [facility]    Subjective   Omar is a 31 year old, presenting for the following health issues: Patient had a dental procedure last Thursday she had a filling put in one of her front teeth and a tooth that was chipped.  She is very unhappy with the dentist.  He prescribed amoxicillin for which she did not start because she was already on doxycycline for a different problem.  She notes that she continues to have the tooth pain.  No fevers.  It hurts to eat anything because of that tooth.  Temperature sensitivity.  Dental Problem      HPI     Concerns with tooth pain. Saw dentist a week ago for a root canal and drove a \"hole through her tooth down to her nerve and then filled it\". She has another appt with a different dentist on Tuesday.         Review of Systems   Constitutional, HEENT, cardiovascular, pulmonary, gi and gu systems are negative, except as otherwise noted.      Objective           Vitals:  No vitals were obtained today due to virtual visit.    Physical Exam   GENERAL: Healthy, alert and no distress  EYES: Eyes grossly normal to inspection.  No discharge or erythema, or obvious scleral/conjunctival abnormalities.  RESP: No audible wheeze, cough, or visible cyanosis.  No visible retractions or increased work of breathing.    SKIN: Visible " skin clear. No significant rash, abnormal pigmentation or lesions.  NEURO: Cranial nerves grossly intact.  Mentation and speech appropriate for age.  PSYCH: Mentation appears normal, affect normal/bright, judgement and insight intact, normal speech and appearance well-groomed.                Video-Visit Details    Video Start Time: 720    Type of service:  Video Visit    Video End Time:725    Originating Location (pt. Location): Home    Distant Location (provider location):  [facility]     Platform used for Video Visit: Ann    .  Michelle.

## 2022-06-16 NOTE — TELEPHONE ENCOUNTER
Called patient and informed her Dr. Casillas is on vacation. I did try to find her a appointment with another provider. We were full. She is going to go to the urgent care to be seen. If it doesn't get better.  Thank you.  Da SHEFFIELD

## 2022-07-19 ENCOUNTER — OFFICE VISIT (OUTPATIENT)
Dept: OBGYN | Facility: OTHER | Age: 32
End: 2022-07-19
Payer: COMMERCIAL

## 2022-07-19 VITALS — BODY MASS INDEX: 23.8 KG/M2 | DIASTOLIC BLOOD PRESSURE: 75 MMHG | SYSTOLIC BLOOD PRESSURE: 119 MMHG | WEIGHT: 143 LBS

## 2022-07-19 DIAGNOSIS — R10.2 PELVIC PAIN IN FEMALE: Primary | ICD-10-CM

## 2022-07-19 DIAGNOSIS — G89.18 ACUTE POST-OPERATIVE PAIN: ICD-10-CM

## 2022-07-19 DIAGNOSIS — N92.6 IRREGULAR MENSTRUAL CYCLE: ICD-10-CM

## 2022-07-19 PROCEDURE — 99213 OFFICE O/P EST LOW 20 MIN: CPT | Performed by: OBSTETRICS & GYNECOLOGY

## 2022-07-19 NOTE — PROGRESS NOTES
SUBJECTIVE:                                                   Brittainy N Brachtl is a 31 year old female who presents to clinic today for the following health issue(s):  Patient presents with:  Consult    I spent a total of 25 minutes on the care of Omar on the day of service including 20 minutes face-to-face time and the remainder in chart review, documentation on the day of service and care coordination.  Azalia is seeing Dr. Casillas tomorrow for preop.  She recently had a D&C for miscarriage which seemed to have a protracted course of pain and that is something that I will message Dr. Casillas to see if if she believes we need a pain contract.    We talked about risks and benefits of surgery as well as recuperation.  Described recuperation over the first day the first week in the first month of her recovery and expectations.  We discussed postop medications being typically ibuprofen and Vicodin.  We discussed restrictions in terms of sexual activity, exertional activity and need for additional rest.  We also discussed expectations for the first day that this is almost always a same-day procedure.    She signed a consent understanding that hysterectomy would limit her ability to get pregnant.  She brings with her today 3 of her 4 children and demonstrates a wonderful nurturing nature.    Assessment:  31-year-old with a history of dysmenorrhea as well as menorrhagia.  She is been planning for a hysterectomy since prior to her most recent miscarriage.    Plan:  Plan for subtotal hysterectomy, bilateral salpingectomy.  We did review the difference between a subtotal or total and she is advised that she will need to continue on with Pap smears in the future.    Additional information: patient in clinic today for pre-surgical consult      Patient's last menstrual period was 2022..     Patient is sexually active, .  Using condoms for contraception.    reports that she has been smoking  cigarettes. She has a 5.00 pack-year smoking history. She has never used smokeless tobacco.    STD testing offered?  Declined    Health maintenance updated:  no    Today's PHQ-2 Score:   PHQ-2 ( 1999 Pf[employer]) 3/19/2022   Q1: Little interest or pleasure in doing things 0   Q2: Feeling down, depressed or hopeless 0   PHQ-2 Score 0   PHQ-2 Total Score (12-17 Years)- Positive if 3 or more points; Administer PHQ-A if positive -   Q1: Little interest or pleasure in doing things Not at all   Q2: Feeling down, depressed or hopeless Not at all   PHQ-2 Score 0     Today's PHQ-9 Score:   PHQ-9 SCORE 5/20/2020   PHQ-9 Total Score MyChart 5 (Mild depression)   PHQ-9 Total Score 5     Today's RADHA-7 Score: No flowsheet data found.    Problem list and histories reviewed & adjusted, as indicated.  Additional history: as documented.    Patient Active Problem List   Diagnosis     Migraine without aura and without status migrainosus, not intractable     Chronic bilateral low back pain with bilateral sciatica     Greater trochanteric bursitis of both hips     Chronic hip pain, bilateral     Past Surgical History:   Procedure Laterality Date     CHOLECYSTECTOMY  07/2009     DILATION AND CURETTAGE SUCTION N/A 5/26/2022    Procedure: DILATION AND CURETTAGE, UTERUS, USING SUCTION;  Surgeon: Axel Leon MD;  Location:  OR      Social History     Tobacco Use     Smoking status: Light Tobacco Smoker     Packs/day: 0.50     Years: 10.00     Pack years: 5.00     Types: Cigarettes     Smokeless tobacco: Never Used     Tobacco comment: 1-3 daily   Substance Use Topics     Alcohol use: Not Currently     Comment: very rarely      Problem (# of Occurrences) Relation (Name,Age of Onset)    Back Pain (1) Mother (Catherine)    Bronchitis (1) Maternal Grandmother    Cancer (1) Mother (Catherine)    Cerebrovascular Disease (1) Maternal Grandfather    Emphysema (1) Maternal Grandmother    No Known Problems (7) Half-Brother (Bienvenido), Half-Brother (Chris),  Paternal Grandmother, Paternal Grandfather, Daughter (Maria Del Carmen), Daughter (Brittani), Daughter (Georgina)            Current Outpatient Medications   Medication Sig     SUMAtriptan (IMITREX) 100 MG tablet Take 1 tablet (100 mg) by mouth at onset of headache for migraine May repeat in 2 hours. Max 4 tablets/24 hours.     ibuprofen (ADVIL/MOTRIN) 800 MG tablet Take 1 tablet (800 mg) by mouth every 6 hours as needed for other (mild and/or inflammatory pain) (Patient not taking: No sig reported)     No current facility-administered medications for this visit.     No Known Allergies    ROS:  12 point review of systems negative other than symptoms noted below or in the HPI.  No urinary frequency or dysuria, bladder or kidney problems      OBJECTIVE:     /75 (BP Location: Right arm, Cuff Size: Adult Regular)   Wt 64.9 kg (143 lb)   LMP 03/12/2022   BMI 23.80 kg/m    Body mass index is 23.8 kg/m .    Exam:  deferred     In-Clinic Test Results:  No results found for this or any previous visit (from the past 24 hour(s)).    ASSESSMENT/PLAN:                                                      As above    Axel Leon MD  [facility]

## 2022-07-20 ENCOUNTER — MYC MEDICAL ADVICE (OUTPATIENT)
Dept: OBGYN | Facility: OTHER | Age: 32
End: 2022-07-20

## 2022-07-26 ENCOUNTER — OFFICE VISIT (OUTPATIENT)
Dept: FAMILY MEDICINE | Facility: CLINIC | Age: 32
End: 2022-07-26
Payer: COMMERCIAL

## 2022-07-26 VITALS
WEIGHT: 144.2 LBS | TEMPERATURE: 97.7 F | OXYGEN SATURATION: 97 % | BODY MASS INDEX: 24 KG/M2 | SYSTOLIC BLOOD PRESSURE: 124 MMHG | DIASTOLIC BLOOD PRESSURE: 82 MMHG | RESPIRATION RATE: 18 BRPM | HEART RATE: 104 BPM

## 2022-07-26 DIAGNOSIS — M54.41 CHRONIC BILATERAL LOW BACK PAIN WITH BILATERAL SCIATICA: ICD-10-CM

## 2022-07-26 DIAGNOSIS — N92.1 MENORRHAGIA WITH IRREGULAR CYCLE: ICD-10-CM

## 2022-07-26 DIAGNOSIS — Z11.52 ENCOUNTER FOR PREPROCEDURE SCREENING LABORATORY TESTING FOR COVID-19: ICD-10-CM

## 2022-07-26 DIAGNOSIS — Z01.818 PREOP GENERAL PHYSICAL EXAM: Primary | ICD-10-CM

## 2022-07-26 DIAGNOSIS — M54.42 CHRONIC BILATERAL LOW BACK PAIN WITH BILATERAL SCIATICA: ICD-10-CM

## 2022-07-26 DIAGNOSIS — G43.009 MIGRAINE WITHOUT AURA AND WITHOUT STATUS MIGRAINOSUS, NOT INTRACTABLE: ICD-10-CM

## 2022-07-26 DIAGNOSIS — G89.29 CHRONIC BILATERAL LOW BACK PAIN WITH BILATERAL SCIATICA: ICD-10-CM

## 2022-07-26 DIAGNOSIS — N94.6 DYSMENORRHEA: ICD-10-CM

## 2022-07-26 DIAGNOSIS — Z01.812 ENCOUNTER FOR PREPROCEDURE SCREENING LABORATORY TESTING FOR COVID-19: ICD-10-CM

## 2022-07-26 LAB — SARS-COV-2 RNA RESP QL NAA+PROBE: NEGATIVE

## 2022-07-26 PROCEDURE — U0005 INFEC AGEN DETEC AMPLI PROBE: HCPCS | Performed by: STUDENT IN AN ORGANIZED HEALTH CARE EDUCATION/TRAINING PROGRAM

## 2022-07-26 PROCEDURE — U0003 INFECTIOUS AGENT DETECTION BY NUCLEIC ACID (DNA OR RNA); SEVERE ACUTE RESPIRATORY SYNDROME CORONAVIRUS 2 (SARS-COV-2) (CORONAVIRUS DISEASE [COVID-19]), AMPLIFIED PROBE TECHNIQUE, MAKING USE OF HIGH THROUGHPUT TECHNOLOGIES AS DESCRIBED BY CMS-2020-01-R: HCPCS | Performed by: STUDENT IN AN ORGANIZED HEALTH CARE EDUCATION/TRAINING PROGRAM

## 2022-07-26 PROCEDURE — 99214 OFFICE O/P EST MOD 30 MIN: CPT | Performed by: STUDENT IN AN ORGANIZED HEALTH CARE EDUCATION/TRAINING PROGRAM

## 2022-07-26 ASSESSMENT — PAIN SCALES - GENERAL: PAINLEVEL: MODERATE PAIN (5)

## 2022-07-26 NOTE — PATIENT INSTRUCTIONS
Preparing for Your Surgery  Getting started  A nurse will call you to review your health history and instructions. They will give you an arrival time based on your scheduled surgery time. Please be ready to share:    Your doctor's clinic name and phone number    Your medical, surgical and anesthesia history    A list of allergies and sensitivities    A list of medicines, including herbal treatments and over-the-counter drugs    Whether the patient has a legal guardian (ask how to send us the papers in advance)  Please tell us if you're pregnant--or if there's any chance you might be pregnant. Some surgeries may injure a fetus (unborn baby), so they require a pregnancy test. Surgeries that are safe for a fetus don't always need a test, and you can choose whether to have one.   If you have a child who's having surgery, please ask for a copy of Preparing for Your Child's Surgery.    Preparing for surgery    Within 30 days of surgery: Have a pre-op exam (sometimes called an H&P, or History and Physical). This can be done at a clinic or pre-operative center.  ? If you're having a , you may not need this exam. Talk to your care team.    At your pre-op exam, talk to your care team about all medicines you take. If you need to stop any medicines before surgery, ask when to start taking them again.  ? We do this for your safety. Many medicines can make you bleed too much during surgery. Some change how well surgery (anesthesia) drugs work.    Call your insurance company to let them know you're having surgery. (If you don't have insurance, call [phone].)    Call your clinic if there's any change in your health. This includes signs of a cold or flu (sore throat, runny nose, cough, rash, fever). It also includes a scrape or scratch near the surgery site.    If you have questions on the day of surgery, call your hospital or surgery center.  COVID testing  You may need to be tested for COVID-19 before having  surgery. If so, we will give you instructions.  Eating and drinking guidelines  For your safety: Unless your surgeon tells you otherwise, follow the guidelines below.    Eat and drink as usual until 8 hours before surgery. After that, no food or milk.    Drink clear liquids until 2 hours before surgery. These are liquids you can see through, like water, Gatorade and Propel Water. You may also have black coffee and tea (no cream or milk).    Nothing by mouth within 2 hours of surgery. This includes gum, candy and breath mints.    If you drink alcohol: Stop drinking it the night before surgery.    If your care team tells you to take medicine on the morning of surgery, it's okay to take it with a sip of water.  Preventing infection    Shower or bathe the night before and morning of your surgery. Follow the instructions your clinic gave you. (If no instructions, use regular soap.)    Don't shave or clip hair near your surgery site. We'll remove the hair if needed.    Don't smoke or vape the morning of surgery. You may chew nicotine gum up to 2 hours before surgery. A nicotine patch is okay.  ? Note: Some surgeries require you to completely quit smoking and nicotine. Check with your surgeon.    Your care team will make every effort to keep you safe from infection. We will:  ? Clean our hands often with soap and water (or an alcohol-based hand rub).  ? Clean the skin at your surgery site with a special soap that kills germs.  ? Give you a special gown to keep you warm. (Cold raises the risk of infection.)  ? Wear special hair covers, masks, gowns and gloves during surgery.  ? Give antibiotic medicine, if prescribed. Not all surgeries need antibiotics.  What to bring on the day of surgery    Photo ID and insurance card    Copy of your health care directive, if you have one    Glasses and hearing aides (bring cases)  ? You can't wear contacts during surgery    Inhaler and eye drops, if you use them (tell us about these when  you arrive)    CPAP machine or breathing device, if you use them    A few personal items, if spending the night    If you have . . .  ? A pacemaker, ICD (cardiac defibrillator) or other implant: Bring the ID card.  ? An implanted stimulator: Bring the remote control.  ? A legal guardian: Bring a copy of the certified (court-stamped) guardianship papers.  Please remove any jewelry, including body piercings. Leave jewelry and other valuables at home.  If you're going home the day of surgery    You must have a responsible adult drive you home. They should stay with you overnight as well.    If you don't have someone to stay with you, and you aren't safe to go home alone, we may keep you overnight. Insurance often won't pay for this.  After surgery  If it's hard to control your pain or you need more pain medicine, please call your surgeon's office.  Questions?   If you have any questions for your care team, list them here: _________________________________________________________________________________________________________________________________________________________________________ ____________________________________ ____________________________________ ____________________________________  For informational purposes only. Not to replace the advice of your health care provider. Copyright   2003, 2019 [facility]. All rights reserved. Clinically reviewed by Magi De La Vega MD. Naiku [id number] - REV 07/21.

## 2022-07-26 NOTE — H&P (VIEW-ONLY)
[address] [address]  Phone: [phone]  Fax: [phone]  Primary Provider: Rosario Casillas  Pre-op Performing Provider: GABE CARRERA      PREOPERATIVE EVALUATION:  Today's date: 7/26/2022    Brittainy N Brachtl is a 31 year old female who presents for a preoperative evaluation.    Surgical Information:  Surgery/Procedure: HYSTERECTOMY, TOTAL, ROBOT-ASSISTED subtotal Bilateral salpingectomy  Surgery Location: [facility]   Surgeon: Rashad Leon  Surgery Date: 7/28/22  Time of Surgery: 9:00 am  Where patient plans to recover: At home with family  Fax number for surgical facility: Note does not need to be faxed, will be available electronically in Epic.    Type of Anesthesia Anticipated: General     Assessment & Plan     The proposed surgical procedure is considered INTERMEDIATE risk.    Preop general physical exam  Cardiovascular clearance.     Menorrhagia with irregular cycle  Previous hgb normal after D&C    Dysmenorrhea      Chronic bilateral low back pain with bilateral sciatica      Migraine without aura and without status migrainosus, not intractable             Risks and Recommendations:  The patient has the following additional risks and recommendations for perioperative complications:   - No identified additional risk factors other than previously addressed     Medication Instructions:  Hold Ibuprofen 5 days prior to procedure    RECOMMENDATION:  APPROVAL GIVEN to proceed with proposed procedure, without further diagnostic evaluation.      Subjective      HPI related to upcoming procedure: Trouble with irregular painful periods and menorrhagia. Worse recently with multiple miscarriages.  Recently requiring D&C due to excessive bleeding. Has 4 children and ready to be done. Also some concern for endometriosis with dysmenorrhea.     Preop Questions 7/19/2022   1. Have you ever had a heart attack or stroke? No   2. Have you  ever had surgery on your heart or blood vessels, such as a stent placement, a coronary artery bypass, or surgery on an artery in your head, neck, heart, or legs? No   3. Do you have chest pain with activity? No   4. Do you have a history of  heart failure? No   5. Do you currently have a cold, bronchitis or symptoms of other infection? No   6. Do you have a cough, shortness of breath, or wheezing? No   7. Do you or anyone in your family have previous history of blood clots? No   8. Do you or does anyone in your family have a serious bleeding problem such as prolonged bleeding following surgeries or cuts? No   9. Have you ever had problems with anemia or been told to take iron pills? No   10. Have you had any abnormal blood loss such as black, tarry or bloody stools, or abnormal vaginal bleeding? No   11. Have you ever had a blood transfusion? No   12. Are you willing to have a blood transfusion if it is medically needed before, during, or after your surgery? Yes   13. Have you or any of your relatives ever had problems with anesthesia? No   14. Do you have sleep apnea, excessive snoring or daytime drowsiness? No   15. Do you have any artifical heart valves or other implanted medical devices like a pacemaker, defibrillator, or continuous glucose monitor? No   16. Do you have artificial joints? No   17. Are you allergic to latex? No   18. Is there any chance that you may be pregnant? No       Health Care Directive:  Patient does not have a Health Care Directive or Living Will: Discussed advance care planning with patient; information given to patient to review.    Preoperative Review of :   reviewed - previously prescribed acutely for pain but not currently        Review of Systems  Constitutional, neuro, ENT, endocrine, pulmonary, cardiac, gastrointestinal, genitourinary, musculoskeletal, integument and psychiatric systems are negative, except as otherwise noted.    Patient Active Problem List    Diagnosis Date  Noted     Menorrhagia with irregular cycle 07/26/2022     Priority: Medium     Chronic hip pain, bilateral 12/30/2020     Priority: Medium     Chronic bilateral low back pain with bilateral sciatica 12/02/2020     Priority: Medium     Greater trochanteric bursitis of both hips 12/02/2020     Priority: Medium     Migraine without aura and without status migrainosus, not intractable 07/09/2020     Priority: Medium      Past Medical History:   Diagnosis Date     Asthma      Cystic fibrosis carrier      Migraines      Past Surgical History:   Procedure Laterality Date     CHOLECYSTECTOMY  07/2009     DILATION AND CURETTAGE SUCTION N/A 5/26/2022    Procedure: DILATION AND CURETTAGE, UTERUS, USING SUCTION;  Surgeon: Axel Leon MD;  Location: [facility] OR     Current Outpatient Medications   Medication Sig Dispense Refill     ibuprofen (ADVIL/MOTRIN) 800 MG tablet Take 1 tablet (800 mg) by mouth every 6 hours as needed for other (mild and/or inflammatory pain) 10 tablet 0     SUMAtriptan (IMITREX) 100 MG tablet Take 1 tablet (100 mg) by mouth at onset of headache for migraine May repeat in 2 hours. Max 4 tablets/24 hours. 9 tablet 3       No Known Allergies     Social History     Tobacco Use     Smoking status: Light Tobacco Smoker     Packs/day: 0.50     Years: 10.00     Pack years: 5.00     Types: Cigarettes     Smokeless tobacco: Never Used     Tobacco comment: 1-3 daily   Substance Use Topics     Alcohol use: Not Currently     Comment: very rarely     Family History   Problem Relation Age of Onset     Cancer Mother      Back Pain Mother      No Known Problems Half-Brother      No Known Problems Half-Brother      Emphysema Maternal Grandmother      Bronchitis Maternal Grandmother      Cerebrovascular Disease Maternal Grandfather      No Known Problems Paternal Grandmother      No Known Problems Paternal Grandfather      No Known Problems Daughter      No Known Problems Daughter      No Known Problems Daughter       History   Drug Use Unknown         Objective     /82 (BP Location: Right arm, Patient Position: Sitting, Cuff Size: Adult Regular)   Pulse 104   Temp 97.7  F (36.5  C) (Temporal)   Resp 18   Wt 65.4 kg (144 lb 3.2 oz)   LMP 07/09/2022   SpO2 97%   BMI 24.00 kg/m      Physical Exam    GENERAL APPEARANCE: healthy, alert and no distress     EYES: EOMI, PERRL     HENT: ear canals and TM's normal and nose and mouth without ulcers or lesions, some poor dentition     NECK: no adenopathy, no asymmetry, masses, or scars and thyroid normal to palpation     RESP: lungs clear to auscultation - no rales, rhonchi or wheezes     CV: regular rates and rhythm, normal S1 S2, no S3 or S4 and no murmur, click or rub     ABDOMEN:  soft, nontender, no HSM or masses and bowel sounds normal     MS: extremities normal- no gross deformities noted, no evidence of inflammation in joints, FROM in all extremities.     SKIN: no suspicious lesions or rashes     NEURO: Normal strength and tone, sensory exam grossly normal, mentation intact and speech normal     PSYCH: mentation appears normal. and affect normal/bright     LYMPHATICS: No cervical adenopathy    Recent Labs   Lab Test 06/07/22  1400 06/03/22  1634 05/25/22  2154   HGB 13.9 13.9 12.8    285 251   NA  --  142 142   POTASSIUM  --  3.5 3.7   CR  --  0.67 0.59        Diagnostics:  No labs were ordered during this visit.   No EKG required, no history of coronary heart disease, significant arrhythmia, peripheral arterial disease or other structural heart disease.    Revised Cardiac Risk Index (RCRI):  The patient has the following serious cardiovascular risks for perioperative complications:   - No serious cardiac risks = 0 points     RCRI Interpretation: 0 points: Class I (very low risk - 0.4% complication rate)           Signed Electronically by: Jacob Rosado MD  Copy of this evaluation report is provided to requesting physician.

## 2022-07-26 NOTE — PROGRESS NOTES
[address] [address]  Phone: [phone]  Fax: [phone]  Primary Provider: Rosario Casillas  Pre-op Performing Provider: GABE CARRERA      PREOPERATIVE EVALUATION:  Today's date: 7/26/2022    Brittainy N Brachtl is a 31 year old female who presents for a preoperative evaluation.    Surgical Information:  Surgery/Procedure: HYSTERECTOMY, TOTAL, ROBOT-ASSISTED subtotal Bilateral salpingectomy  Surgery Location: [facility]   Surgeon: Rashad Leon  Surgery Date: 7/28/22  Time of Surgery: 9:00 am  Where patient plans to recover: At home with family  Fax number for surgical facility: Note does not need to be faxed, will be available electronically in Epic.    Type of Anesthesia Anticipated: General     Assessment & Plan     The proposed surgical procedure is considered INTERMEDIATE risk.    Preop general physical exam  Cardiovascular clearance.     Menorrhagia with irregular cycle  Previous hgb normal after D&C    Dysmenorrhea      Chronic bilateral low back pain with bilateral sciatica      Migraine without aura and without status migrainosus, not intractable             Risks and Recommendations:  The patient has the following additional risks and recommendations for perioperative complications:   - No identified additional risk factors other than previously addressed     Medication Instructions:  Hold Ibuprofen 5 days prior to procedure    RECOMMENDATION:  APPROVAL GIVEN to proceed with proposed procedure, without further diagnostic evaluation.      Subjective      HPI related to upcoming procedure: Trouble with irregular painful periods and menorrhagia. Worse recently with multiple miscarriages.  Recently requiring D&C due to excessive bleeding. Has 4 children and ready to be done. Also some concern for endometriosis with dysmenorrhea.     Preop Questions 7/19/2022   1. Have you ever had a heart attack or stroke? No   2. Have you  ever had surgery on your heart or blood vessels, such as a stent placement, a coronary artery bypass, or surgery on an artery in your head, neck, heart, or legs? No   3. Do you have chest pain with activity? No   4. Do you have a history of  heart failure? No   5. Do you currently have a cold, bronchitis or symptoms of other infection? No   6. Do you have a cough, shortness of breath, or wheezing? No   7. Do you or anyone in your family have previous history of blood clots? No   8. Do you or does anyone in your family have a serious bleeding problem such as prolonged bleeding following surgeries or cuts? No   9. Have you ever had problems with anemia or been told to take iron pills? No   10. Have you had any abnormal blood loss such as black, tarry or bloody stools, or abnormal vaginal bleeding? No   11. Have you ever had a blood transfusion? No   12. Are you willing to have a blood transfusion if it is medically needed before, during, or after your surgery? Yes   13. Have you or any of your relatives ever had problems with anesthesia? No   14. Do you have sleep apnea, excessive snoring or daytime drowsiness? No   15. Do you have any artifical heart valves or other implanted medical devices like a pacemaker, defibrillator, or continuous glucose monitor? No   16. Do you have artificial joints? No   17. Are you allergic to latex? No   18. Is there any chance that you may be pregnant? No       Health Care Directive:  Patient does not have a Health Care Directive or Living Will: Discussed advance care planning with patient; information given to patient to review.    Preoperative Review of :   reviewed - previously prescribed acutely for pain but not currently        Review of Systems  Constitutional, neuro, ENT, endocrine, pulmonary, cardiac, gastrointestinal, genitourinary, musculoskeletal, integument and psychiatric systems are negative, except as otherwise noted.    Patient Active Problem List    Diagnosis Date  Noted     Menorrhagia with irregular cycle 07/26/2022     Priority: Medium     Chronic hip pain, bilateral 12/30/2020     Priority: Medium     Chronic bilateral low back pain with bilateral sciatica 12/02/2020     Priority: Medium     Greater trochanteric bursitis of both hips 12/02/2020     Priority: Medium     Migraine without aura and without status migrainosus, not intractable 07/09/2020     Priority: Medium      Past Medical History:   Diagnosis Date     Asthma      Cystic fibrosis carrier      Migraines      Past Surgical History:   Procedure Laterality Date     CHOLECYSTECTOMY  07/2009     DILATION AND CURETTAGE SUCTION N/A 5/26/2022    Procedure: DILATION AND CURETTAGE, UTERUS, USING SUCTION;  Surgeon: Axel Leon MD;  Location: [facility] OR     Current Outpatient Medications   Medication Sig Dispense Refill     ibuprofen (ADVIL/MOTRIN) 800 MG tablet Take 1 tablet (800 mg) by mouth every 6 hours as needed for other (mild and/or inflammatory pain) 10 tablet 0     SUMAtriptan (IMITREX) 100 MG tablet Take 1 tablet (100 mg) by mouth at onset of headache for migraine May repeat in 2 hours. Max 4 tablets/24 hours. 9 tablet 3       No Known Allergies     Social History     Tobacco Use     Smoking status: Light Tobacco Smoker     Packs/day: 0.50     Years: 10.00     Pack years: 5.00     Types: Cigarettes     Smokeless tobacco: Never Used     Tobacco comment: 1-3 daily   Substance Use Topics     Alcohol use: Not Currently     Comment: very rarely     Family History   Problem Relation Age of Onset     Cancer Mother      Back Pain Mother      No Known Problems Half-Brother      No Known Problems Half-Brother      Emphysema Maternal Grandmother      Bronchitis Maternal Grandmother      Cerebrovascular Disease Maternal Grandfather      No Known Problems Paternal Grandmother      No Known Problems Paternal Grandfather      No Known Problems Daughter      No Known Problems Daughter      No Known Problems Daughter       History   Drug Use Unknown         Objective     /82 (BP Location: Right arm, Patient Position: Sitting, Cuff Size: Adult Regular)   Pulse 104   Temp 97.7  F (36.5  C) (Temporal)   Resp 18   Wt 65.4 kg (144 lb 3.2 oz)   LMP 07/09/2022   SpO2 97%   BMI 24.00 kg/m      Physical Exam    GENERAL APPEARANCE: healthy, alert and no distress     EYES: EOMI, PERRL     HENT: ear canals and TM's normal and nose and mouth without ulcers or lesions, some poor dentition     NECK: no adenopathy, no asymmetry, masses, or scars and thyroid normal to palpation     RESP: lungs clear to auscultation - no rales, rhonchi or wheezes     CV: regular rates and rhythm, normal S1 S2, no S3 or S4 and no murmur, click or rub     ABDOMEN:  soft, nontender, no HSM or masses and bowel sounds normal     MS: extremities normal- no gross deformities noted, no evidence of inflammation in joints, FROM in all extremities.     SKIN: no suspicious lesions or rashes     NEURO: Normal strength and tone, sensory exam grossly normal, mentation intact and speech normal     PSYCH: mentation appears normal. and affect normal/bright     LYMPHATICS: No cervical adenopathy    Recent Labs   Lab Test 06/07/22  1400 06/03/22  1634 05/25/22  2154   HGB 13.9 13.9 12.8    285 251   NA  --  142 142   POTASSIUM  --  3.5 3.7   CR  --  0.67 0.59        Diagnostics:  No labs were ordered during this visit.   No EKG required, no history of coronary heart disease, significant arrhythmia, peripheral arterial disease or other structural heart disease.    Revised Cardiac Risk Index (RCRI):  The patient has the following serious cardiovascular risks for perioperative complications:   - No serious cardiac risks = 0 points     RCRI Interpretation: 0 points: Class I (very low risk - 0.4% complication rate)           Signed Electronically by: Jacob Rosado MD  Copy of this evaluation report is provided to requesting physician.

## 2022-07-27 ENCOUNTER — ANESTHESIA EVENT (OUTPATIENT)
Dept: SURGERY | Facility: CLINIC | Age: 32
End: 2022-07-27
Payer: COMMERCIAL

## 2022-07-27 NOTE — ANESTHESIA PREPROCEDURE EVALUATION
Anesthesia Pre-Procedure Evaluation    Patient: Brittainy N Brachtl   MRN: [id number] : 1990        Procedure : Procedure(s):  HYSTERECTOMY, TOTAL, ROBOT-ASSISTED subtotal Bilateral salpingectomy          Past Medical History:   Diagnosis Date     Asthma      Cystic fibrosis carrier      Migraines       Past Surgical History:   Procedure Laterality Date     CHOLECYSTECTOMY  2009     DILATION AND CURETTAGE SUCTION N/A 2022    Procedure: DILATION AND CURETTAGE, UTERUS, USING SUCTION;  Surgeon: Axel Leon MD;  Location: [facility]      No Known Allergies   Social History     Tobacco Use     Smoking status: Light Tobacco Smoker     Packs/day: 0.50     Years: 10.00     Pack years: 5.00     Types: Cigarettes     Smokeless tobacco: Never Used     Tobacco comment: 1-3 daily   Substance Use Topics     Alcohol use: Not Currently     Comment: very rarely      Wt Readings from Last 1 Encounters:   22 65.4 kg (144 lb 3.2 oz)        Anesthesia Evaluation   Pt has had prior anesthetic. Type: General.    History of anesthetic complications  - PONV.      ROS/MED HX  ENT/Pulmonary:     (+) tobacco use, Current use, 5  Pack-Year Hx,  Intermittent, asthma Treatment: Inhaler prn,      Neurologic:     (+) migraines,     Cardiovascular:  - neg cardiovascular ROS     METS/Exercise Tolerance:     Hematologic:  - neg hematologic  ROS     Musculoskeletal:  - neg musculoskeletal ROS     GI/Hepatic:  - neg GI/hepatic ROS     Renal/Genitourinary:  - neg Renal ROS     Endo:  - neg endo ROS     Psychiatric/Substance Use:       Infectious Disease:  - neg infectious disease ROS     Malignancy:  - neg malignancy ROS     Other: Comment: Serum pg test (-) on 22 - denies any chance of being pregnant - neg other ROS   (-) Any chance pregnant       Physical Exam    Airway        Mallampati: I   TM distance: > 3 FB   Neck ROM: full   Mouth opening: > 3 cm    Respiratory Devices and Support         Dental  no notable dental  history         Cardiovascular   cardiovascular exam normal          Pulmonary   pulmonary exam normal                OUTSIDE LABS:  CBC:   Lab Results   Component Value Date    WBC 11.7 (H) 06/07/2022    WBC 14.2 (H) 06/03/2022    HGB 13.9 06/07/2022    HGB 13.9 06/03/2022    HCT 41.8 06/07/2022    HCT 41.4 06/03/2022     06/07/2022     06/03/2022     BMP:   Lab Results   Component Value Date     06/03/2022     05/25/2022    POTASSIUM 3.5 06/03/2022    POTASSIUM 3.7 05/25/2022    CHLORIDE 112 (H) 06/03/2022    CHLORIDE 111 (H) 05/25/2022    CO2 24 06/03/2022    CO2 27 05/25/2022    BUN 4 (L) 06/03/2022    BUN 7 05/25/2022    CR 0.67 06/03/2022    CR 0.59 05/25/2022    GLC 96 06/03/2022     (H) 05/25/2022     COAGS: No results found for: PTT, INR, FIBR  POC:   Lab Results   Component Value Date    HCG Positive (A) 10/23/2019     HEPATIC:   Lab Results   Component Value Date    ALBUMIN 4.3 06/03/2022    PROTTOTAL 7.5 06/03/2022    ALT 17 06/03/2022    AST 6 06/03/2022    ALKPHOS 77 06/03/2022    BILITOTAL 0.3 06/03/2022     OTHER:   Lab Results   Component Value Date    A1C 4.7 12/09/2019    BOB 9.2 06/03/2022    CRP <2.9 12/29/2020    SED 6 12/29/2020       Anesthesia Plan    ASA Status:  2   NPO Status:  NPO Appropriate    Anesthesia Type: General.     - Airway: ETT   Induction: Propofol, Intravenous.   Maintenance: Inhalation.        Consents    Anesthesia Plan(s) and associated risks, benefits, and realistic alternatives discussed. Questions answered and patient/representative(s) expressed understanding.     - Discussed: Risks, Benefits and Alternatives for BOTH SEDATION and the PROCEDURE were discussed     - Discussed with:  Patient      - Extended Intubation/Ventilatory Support Discussed: No.      - Patient is DNR/DNI Status: No    Use of blood products discussed: No .     Postoperative Care    Pain management: IV analgesics, Oral pain medications, Multi-modal analgesia.   PONV  prophylaxis: Ondansetron (or other 5HT-3), Dexamethasone or Solumedrol, Background Propofol Infusion, Scopolamine patch     Comments:    Other Comments: The risks and benefits of anesthesia, and the alternatives where applicable, have been discussed with the patient, and they wish to proceed.               FABIOLA Urbano CRNA

## 2022-07-28 ENCOUNTER — HOSPITAL ENCOUNTER (OUTPATIENT)
Facility: CLINIC | Age: 32
Discharge: HOME | End: 2022-07-28
Attending: OBSTETRICS & GYNECOLOGY | Admitting: OBSTETRICS & GYNECOLOGY
Payer: COMMERCIAL

## 2022-07-28 ENCOUNTER — ANESTHESIA (OUTPATIENT)
Dept: SURGERY | Facility: CLINIC | Age: 32
End: 2022-07-28
Payer: COMMERCIAL

## 2022-07-28 VITALS
DIASTOLIC BLOOD PRESSURE: 94 MMHG | TEMPERATURE: 99.5 F | BODY MASS INDEX: 24.04 KG/M2 | WEIGHT: 144.3 LBS | HEART RATE: 80 BPM | SYSTOLIC BLOOD PRESSURE: 124 MMHG | OXYGEN SATURATION: 97 % | HEIGHT: 65 IN | RESPIRATION RATE: 14 BRPM

## 2022-07-28 DIAGNOSIS — G89.18 POST-OP PAIN: Primary | ICD-10-CM

## 2022-07-28 LAB
ABO/RH(D): ABNORMAL
ANTIBODY ID: NORMAL
ANTIBODY SCREEN: POSITIVE
B-HCG SERPL-ACNC: <1 IU/L (ref 0–5)
HGB BLD-MCNC: 13 G/DL (ref 11.7–15.7)
SPECIMEN EXPIRATION DATE: ABNORMAL
SPECIMEN EXPIRATION DATE: NORMAL

## 2022-07-28 PROCEDURE — 710N000010 HC RECOVERY PHASE 1, LEVEL 2, PER MIN: Performed by: OBSTETRICS & GYNECOLOGY

## 2022-07-28 PROCEDURE — 58662 LAPAROSCOPY EXCISE LESIONS: CPT | Mod: 79 | Performed by: OBSTETRICS & GYNECOLOGY

## 2022-07-28 PROCEDURE — 250N000009 HC RX 250: Performed by: NURSE ANESTHETIST, CERTIFIED REGISTERED

## 2022-07-28 PROCEDURE — 250N000011 HC RX IP 250 OP 636: Performed by: NURSE ANESTHETIST, CERTIFIED REGISTERED

## 2022-07-28 PROCEDURE — 86870 RBC ANTIBODY IDENTIFICATION: CPT | Performed by: OBSTETRICS & GYNECOLOGY

## 2022-07-28 PROCEDURE — 84702 CHORIONIC GONADOTROPIN TEST: CPT | Performed by: OBSTETRICS & GYNECOLOGY

## 2022-07-28 PROCEDURE — 250N000013 HC RX MED GY IP 250 OP 250 PS 637: Performed by: OBSTETRICS & GYNECOLOGY

## 2022-07-28 PROCEDURE — 272N000001 HC OR GENERAL SUPPLY STERILE: Performed by: OBSTETRICS & GYNECOLOGY

## 2022-07-28 PROCEDURE — 360N000080 HC SURGERY LEVEL 7, PER MIN: Performed by: OBSTETRICS & GYNECOLOGY

## 2022-07-28 PROCEDURE — 58542 LSH W/T/O UT 250 G OR LESS: CPT | Mod: 79 | Performed by: OBSTETRICS & GYNECOLOGY

## 2022-07-28 PROCEDURE — 85018 HEMOGLOBIN: CPT | Performed by: OBSTETRICS & GYNECOLOGY

## 2022-07-28 PROCEDURE — 250N000011 HC RX IP 250 OP 636: Performed by: OBSTETRICS & GYNECOLOGY

## 2022-07-28 PROCEDURE — 370N000017 HC ANESTHESIA TECHNICAL FEE, PER MIN: Performed by: OBSTETRICS & GYNECOLOGY

## 2022-07-28 PROCEDURE — 88307 TISSUE EXAM BY PATHOLOGIST: CPT | Mod: TC | Performed by: OBSTETRICS & GYNECOLOGY

## 2022-07-28 PROCEDURE — 250N000009 HC RX 250: Performed by: OBSTETRICS & GYNECOLOGY

## 2022-07-28 PROCEDURE — 999N000141 HC STATISTIC PRE-PROCEDURE NURSING ASSESSMENT: Performed by: OBSTETRICS & GYNECOLOGY

## 2022-07-28 PROCEDURE — 250N000013 HC RX MED GY IP 250 OP 250 PS 637: Performed by: NURSE ANESTHETIST, CERTIFIED REGISTERED

## 2022-07-28 PROCEDURE — 250N000026 HC DESFLURANE, PER MIN: Performed by: OBSTETRICS & GYNECOLOGY

## 2022-07-28 PROCEDURE — 86901 BLOOD TYPING SEROLOGIC RH(D): CPT | Performed by: OBSTETRICS & GYNECOLOGY

## 2022-07-28 PROCEDURE — 710N000012 HC RECOVERY PHASE 2, PER MINUTE: Performed by: OBSTETRICS & GYNECOLOGY

## 2022-07-28 PROCEDURE — 258N000003 HC RX IP 258 OP 636: Performed by: NURSE ANESTHETIST, CERTIFIED REGISTERED

## 2022-07-28 RX ORDER — HYDROCODONE BITARTRATE AND ACETAMINOPHEN 5; 325 MG/1; MG/1
1 TABLET ORAL EVERY 6 HOURS PRN
Qty: 15 TABLET | Refills: 0 | Status: SHIPPED | OUTPATIENT
Start: 2022-07-28 | End: 2022-07-31

## 2022-07-28 RX ORDER — ACETAMINOPHEN 325 MG/1
975 TABLET ORAL ONCE
Status: DISCONTINUED | OUTPATIENT
Start: 2022-07-28 | End: 2022-07-28 | Stop reason: HOSPADM

## 2022-07-28 RX ORDER — PROPOFOL 10 MG/ML
INJECTION, EMULSION INTRAVENOUS CONTINUOUS PRN
Status: DISCONTINUED | OUTPATIENT
Start: 2022-07-28 | End: 2022-07-28

## 2022-07-28 RX ORDER — ONDANSETRON 2 MG/ML
4 INJECTION INTRAMUSCULAR; INTRAVENOUS EVERY 30 MIN PRN
Status: DISCONTINUED | OUTPATIENT
Start: 2022-07-28 | End: 2022-07-28 | Stop reason: HOSPADM

## 2022-07-28 RX ORDER — BUPIVACAINE HYDROCHLORIDE AND EPINEPHRINE 2.5; 5 MG/ML; UG/ML
INJECTION, SOLUTION INFILTRATION; PERINEURAL PRN
Status: DISCONTINUED | OUTPATIENT
Start: 2022-07-28 | End: 2022-07-28 | Stop reason: HOSPADM

## 2022-07-28 RX ORDER — ONDANSETRON 2 MG/ML
INJECTION INTRAMUSCULAR; INTRAVENOUS PRN
Status: DISCONTINUED | OUTPATIENT
Start: 2022-07-28 | End: 2022-07-28

## 2022-07-28 RX ORDER — SODIUM CHLORIDE, SODIUM LACTATE, POTASSIUM CHLORIDE, CALCIUM CHLORIDE 600; 310; 30; 20 MG/100ML; MG/100ML; MG/100ML; MG/100ML
INJECTION, SOLUTION INTRAVENOUS CONTINUOUS
Status: DISCONTINUED | OUTPATIENT
Start: 2022-07-28 | End: 2022-07-28 | Stop reason: HOSPADM

## 2022-07-28 RX ORDER — DIMENHYDRINATE 50 MG/ML
12.5 INJECTION, SOLUTION INTRAMUSCULAR; INTRAVENOUS
Status: ACTIVE | OUTPATIENT
Start: 2022-07-28 | End: 2022-07-28

## 2022-07-28 RX ORDER — HYDROXYZINE HYDROCHLORIDE 50 MG/ML
50 INJECTION, SOLUTION INTRAMUSCULAR
Status: COMPLETED | OUTPATIENT
Start: 2022-07-28 | End: 2022-07-28

## 2022-07-28 RX ORDER — CEFAZOLIN SODIUM/WATER 2 G/20 ML
2 SYRINGE (ML) INTRAVENOUS SEE ADMIN INSTRUCTIONS
Status: DISCONTINUED | OUTPATIENT
Start: 2022-07-28 | End: 2022-07-28 | Stop reason: HOSPADM

## 2022-07-28 RX ORDER — ALBUTEROL SULFATE 0.83 MG/ML
2.5 SOLUTION RESPIRATORY (INHALATION) EVERY 4 HOURS PRN
Status: DISCONTINUED | OUTPATIENT
Start: 2022-07-28 | End: 2022-07-28 | Stop reason: HOSPADM

## 2022-07-28 RX ORDER — IBUPROFEN 800 MG/1
800 TABLET, FILM COATED ORAL ONCE
Status: DISCONTINUED | OUTPATIENT
Start: 2022-07-28 | End: 2022-07-28 | Stop reason: HOSPADM

## 2022-07-28 RX ORDER — OXYCODONE HYDROCHLORIDE 5 MG/1
5 TABLET ORAL EVERY 4 HOURS PRN
Status: DISCONTINUED | OUTPATIENT
Start: 2022-07-28 | End: 2022-07-28 | Stop reason: HOSPADM

## 2022-07-28 RX ORDER — HYDROMORPHONE HYDROCHLORIDE 1 MG/ML
0.4 INJECTION, SOLUTION INTRAMUSCULAR; INTRAVENOUS; SUBCUTANEOUS EVERY 5 MIN PRN
Status: DISCONTINUED | OUTPATIENT
Start: 2022-07-28 | End: 2022-07-28 | Stop reason: HOSPADM

## 2022-07-28 RX ORDER — HYDROMORPHONE HYDROCHLORIDE 2 MG/1
2 TABLET ORAL
Status: DISCONTINUED | OUTPATIENT
Start: 2022-07-28 | End: 2022-07-28

## 2022-07-28 RX ORDER — MEPERIDINE HYDROCHLORIDE 25 MG/ML
12.5 INJECTION INTRAMUSCULAR; INTRAVENOUS; SUBCUTANEOUS
Status: DISCONTINUED | OUTPATIENT
Start: 2022-07-28 | End: 2022-07-28 | Stop reason: HOSPADM

## 2022-07-28 RX ORDER — KETAMINE HYDROCHLORIDE 10 MG/ML
INJECTION INTRAMUSCULAR; INTRAVENOUS PRN
Status: DISCONTINUED | OUTPATIENT
Start: 2022-07-28 | End: 2022-07-28

## 2022-07-28 RX ORDER — MEPERIDINE HYDROCHLORIDE 25 MG/ML
75 INJECTION INTRAMUSCULAR; INTRAVENOUS; SUBCUTANEOUS
Status: COMPLETED | OUTPATIENT
Start: 2022-07-28 | End: 2022-07-28

## 2022-07-28 RX ORDER — PROPOFOL 10 MG/ML
INJECTION, EMULSION INTRAVENOUS PRN
Status: DISCONTINUED | OUTPATIENT
Start: 2022-07-28 | End: 2022-07-28

## 2022-07-28 RX ORDER — FENTANYL CITRATE 50 UG/ML
50 INJECTION, SOLUTION INTRAMUSCULAR; INTRAVENOUS EVERY 5 MIN PRN
Status: DISCONTINUED | OUTPATIENT
Start: 2022-07-28 | End: 2022-07-28 | Stop reason: HOSPADM

## 2022-07-28 RX ORDER — SCOLOPAMINE TRANSDERMAL SYSTEM 1 MG/1
1 PATCH, EXTENDED RELEASE TRANSDERMAL ONCE
Status: DISCONTINUED | OUTPATIENT
Start: 2022-07-28 | End: 2022-07-28 | Stop reason: HOSPADM

## 2022-07-28 RX ORDER — LIDOCAINE 40 MG/G
CREAM TOPICAL
Status: DISCONTINUED | OUTPATIENT
Start: 2022-07-28 | End: 2022-07-28 | Stop reason: HOSPADM

## 2022-07-28 RX ORDER — ACETAMINOPHEN 325 MG/1
975 TABLET ORAL ONCE
Status: COMPLETED | OUTPATIENT
Start: 2022-07-28 | End: 2022-07-28

## 2022-07-28 RX ORDER — ONDANSETRON 4 MG/1
4 TABLET, ORALLY DISINTEGRATING ORAL EVERY 30 MIN PRN
Status: DISCONTINUED | OUTPATIENT
Start: 2022-07-28 | End: 2022-07-28 | Stop reason: HOSPADM

## 2022-07-28 RX ORDER — HYDROMORPHONE HYDROCHLORIDE 2 MG/1
4 TABLET ORAL ONCE
Status: COMPLETED | OUTPATIENT
Start: 2022-07-28 | End: 2022-07-28

## 2022-07-28 RX ORDER — LIDOCAINE HYDROCHLORIDE 20 MG/ML
INJECTION, SOLUTION INFILTRATION; PERINEURAL PRN
Status: DISCONTINUED | OUTPATIENT
Start: 2022-07-28 | End: 2022-07-28

## 2022-07-28 RX ORDER — KETOROLAC TROMETHAMINE 30 MG/ML
INJECTION, SOLUTION INTRAMUSCULAR; INTRAVENOUS PRN
Status: DISCONTINUED | OUTPATIENT
Start: 2022-07-28 | End: 2022-07-28

## 2022-07-28 RX ORDER — FENTANYL CITRATE 50 UG/ML
50 INJECTION, SOLUTION INTRAMUSCULAR; INTRAVENOUS
Status: DISCONTINUED | OUTPATIENT
Start: 2022-07-28 | End: 2022-07-28 | Stop reason: HOSPADM

## 2022-07-28 RX ORDER — IBUPROFEN 800 MG/1
800 TABLET, FILM COATED ORAL EVERY 6 HOURS PRN
Qty: 30 TABLET | Refills: 0 | Status: SHIPPED | OUTPATIENT
Start: 2022-07-28 | End: 2022-08-05

## 2022-07-28 RX ORDER — FENTANYL CITRATE 50 UG/ML
INJECTION, SOLUTION INTRAMUSCULAR; INTRAVENOUS PRN
Status: DISCONTINUED | OUTPATIENT
Start: 2022-07-28 | End: 2022-07-28

## 2022-07-28 RX ORDER — DEXMEDETOMIDINE HYDROCHLORIDE 4 UG/ML
INJECTION, SOLUTION INTRAVENOUS PRN
Status: DISCONTINUED | OUTPATIENT
Start: 2022-07-28 | End: 2022-07-28

## 2022-07-28 RX ORDER — HYDROMORPHONE HYDROCHLORIDE 2 MG/1
2-4 TABLET ORAL EVERY 6 HOURS PRN
Qty: 15 TABLET | Refills: 0 | Status: SHIPPED | OUTPATIENT
Start: 2022-07-28 | End: 2022-08-01

## 2022-07-28 RX ORDER — ATROPA BELLADONNA AND OPIUM 16.2; 3 MG/1; MG/1
30 SUPPOSITORY RECTAL ONCE
Status: COMPLETED | OUTPATIENT
Start: 2022-07-28 | End: 2022-07-28

## 2022-07-28 RX ORDER — CEFAZOLIN SODIUM/WATER 2 G/20 ML
2 SYRINGE (ML) INTRAVENOUS
Status: COMPLETED | OUTPATIENT
Start: 2022-07-28 | End: 2022-07-28

## 2022-07-28 RX ORDER — DEXAMETHASONE SODIUM PHOSPHATE 10 MG/ML
INJECTION, SOLUTION INTRAMUSCULAR; INTRAVENOUS PRN
Status: DISCONTINUED | OUTPATIENT
Start: 2022-07-28 | End: 2022-07-28

## 2022-07-28 RX ADMIN — ROCURONIUM BROMIDE 20 MG: 50 INJECTION, SOLUTION INTRAVENOUS at 09:55

## 2022-07-28 RX ADMIN — MIDAZOLAM 1 MG: 1 INJECTION INTRAMUSCULAR; INTRAVENOUS at 09:17

## 2022-07-28 RX ADMIN — HYDROMORPHONE HYDROCHLORIDE 0.4 MG: 1 INJECTION, SOLUTION INTRAMUSCULAR; INTRAVENOUS; SUBCUTANEOUS at 12:15

## 2022-07-28 RX ADMIN — SCOPALAMINE 1 PATCH: 1 PATCH, EXTENDED RELEASE TRANSDERMAL at 07:43

## 2022-07-28 RX ADMIN — ROCURONIUM BROMIDE 50 MG: 50 INJECTION, SOLUTION INTRAVENOUS at 09:06

## 2022-07-28 RX ADMIN — HYDROMORPHONE HYDROCHLORIDE 0.4 MG: 1 INJECTION, SOLUTION INTRAMUSCULAR; INTRAVENOUS; SUBCUTANEOUS at 11:47

## 2022-07-28 RX ADMIN — ONDANSETRON 4 MG: 2 INJECTION INTRAMUSCULAR; INTRAVENOUS at 09:33

## 2022-07-28 RX ADMIN — ACETAMINOPHEN 975 MG: 325 TABLET, FILM COATED ORAL at 07:42

## 2022-07-28 RX ADMIN — LIDOCAINE HYDROCHLORIDE 80 MG: 20 INJECTION, SOLUTION INFILTRATION; PERINEURAL at 09:06

## 2022-07-28 RX ADMIN — HYDROMORPHONE HYDROCHLORIDE 4 MG: 2 TABLET ORAL at 14:30

## 2022-07-28 RX ADMIN — SUGAMMADEX 200 MG: 100 INJECTION, SOLUTION INTRAVENOUS at 11:17

## 2022-07-28 RX ADMIN — Medication 30 MG: at 09:29

## 2022-07-28 RX ADMIN — MIDAZOLAM 1 MG: 1 INJECTION INTRAMUSCULAR; INTRAVENOUS at 12:17

## 2022-07-28 RX ADMIN — FENTANYL CITRATE 50 MCG: 50 INJECTION, SOLUTION INTRAMUSCULAR; INTRAVENOUS at 11:31

## 2022-07-28 RX ADMIN — HYDROXYZINE HYDROCHLORIDE 50 MG: 50 INJECTION, SOLUTION INTRAMUSCULAR at 12:49

## 2022-07-28 RX ADMIN — Medication 10 MG: at 10:30

## 2022-07-28 RX ADMIN — DEXMEDETOMIDINE 10 MCG: 100 INJECTION, SOLUTION, CONCENTRATE INTRAVENOUS at 11:50

## 2022-07-28 RX ADMIN — OXYCODONE HYDROCHLORIDE 5 MG: 5 TABLET ORAL at 12:14

## 2022-07-28 RX ADMIN — PROPOFOL 150 MG: 10 INJECTION, EMULSION INTRAVENOUS at 09:06

## 2022-07-28 RX ADMIN — PROPOFOL 100 MCG/KG/MIN: 10 INJECTION, EMULSION INTRAVENOUS at 09:17

## 2022-07-28 RX ADMIN — LIDOCAINE HYDROCHLORIDE 0.1 ML: 10 INJECTION, SOLUTION EPIDURAL; INFILTRATION; INTRACAUDAL; PERINEURAL at 07:30

## 2022-07-28 RX ADMIN — Medication 2 G: at 08:54

## 2022-07-28 RX ADMIN — FENTANYL CITRATE 50 MCG: 50 INJECTION, SOLUTION INTRAMUSCULAR; INTRAVENOUS at 11:39

## 2022-07-28 RX ADMIN — ATROPA BELLADONNA AND OPIUM 1 SUPPOSITORY: 16.2; 3 SUPPOSITORY RECTAL at 14:03

## 2022-07-28 RX ADMIN — SODIUM CHLORIDE, POTASSIUM CHLORIDE, SODIUM LACTATE AND CALCIUM CHLORIDE: 600; 310; 30; 20 INJECTION, SOLUTION INTRAVENOUS at 08:56

## 2022-07-28 RX ADMIN — LIDOCAINE HYDROCHLORIDE 0.1 ML: 10 INJECTION, SOLUTION EPIDURAL; INFILTRATION; INTRACAUDAL; PERINEURAL at 08:55

## 2022-07-28 RX ADMIN — DEXAMETHASONE SODIUM PHOSPHATE 10 MG: 10 INJECTION, SOLUTION INTRAMUSCULAR; INTRAVENOUS at 09:33

## 2022-07-28 RX ADMIN — DEXMEDETOMIDINE 20 MCG: 100 INJECTION, SOLUTION, CONCENTRATE INTRAVENOUS at 09:27

## 2022-07-28 RX ADMIN — MEPERIDINE HYDROCHLORIDE 75 MG: 25 INJECTION INTRAMUSCULAR; INTRAVENOUS; SUBCUTANEOUS at 12:49

## 2022-07-28 RX ADMIN — ROCURONIUM BROMIDE 20 MG: 50 INJECTION, SOLUTION INTRAVENOUS at 10:38

## 2022-07-28 RX ADMIN — KETOROLAC TROMETHAMINE 30 MG: 30 INJECTION, SOLUTION INTRAMUSCULAR at 11:01

## 2022-07-28 RX ADMIN — FENTANYL CITRATE 50 MCG: 50 INJECTION, SOLUTION INTRAMUSCULAR; INTRAVENOUS at 08:54

## 2022-07-28 RX ADMIN — MIDAZOLAM 1 MG: 1 INJECTION INTRAMUSCULAR; INTRAVENOUS at 11:55

## 2022-07-28 ASSESSMENT — LIFESTYLE VARIABLES: TOBACCO_USE: 1

## 2022-07-28 NOTE — BRIEF OP NOTE
[facility]    Brief Operative Note    Pre-operative diagnosis: Menorrhagia with irregular cycle [N92.1]  Pelvic pain in female [R10.2]  Irregular menstrual cycle [N92.6]  Post-operative diagnosis Same as pre-operative diagnosis    Procedure: Procedure(s):  HYSTERECTOMY, TOTAL, ROBOT-ASSISTED subtotal Bilateral salpingectomy  Surgeon: Surgeon(s) and Role:     * Axel Leon MD - Primary     * Clayton Solorzano MD - Assisting  Anesthesia: General   Estimated Blood Loss: 30 mL from 7/28/2022  9:00 AM to 7/28/2022 11:31 AM      Drains: None  Specimens:   ID Type Source Tests Collected by Time Destination   1 : uterus and left fallopian tube Tissue Uterus, Left Fallopian Tube SURGICAL PATHOLOGY EXAM Axel Leon MD 7/28/2022 11:15 AM      Findings:   None.  Complications: None.  Implants: * No implants in log *

## 2022-07-28 NOTE — OR NURSING
On arrival with pt significant pain issues. Rating pain 9/10. See mar for interventions, pt continued with no relief. Pt crying, stiff, unable to relax. Ice to abdomen  CRNA at bedside to help assist in comfort. Versed and precedex given.    Pt tolerating ice chips. Oral medications tried. Pt still rating pain 7-8/10 after an hour. Order for IM injection.     Pt able to move out of pacu, still in pain rating 7/10 but no longer crying or grimacing. Able to slowly ambulate to bathroom and void.   Pt states pain is deep inside. Order for b&O supp for pain.    Pt rates pain low 7/10 after b&o. Order for oral dilaudid. Given to pt. Awaiting call back from MD regarding changing the home discharge medication.

## 2022-07-28 NOTE — ANESTHESIA POSTPROCEDURE EVALUATION
Patient: Brittainy N Brachtl    Procedure: Procedure(s):  HYSTERECTOMY, TOTAL, ROBOT-ASSISTED subtotal Bilateral salpingectomy       Anesthesia Type:  General    Note:  Disposition: Outpatient   Postop Pain Control: Uneventful            Sign Out: Well controlled pain   PONV: No   Neuro/Psych: Uneventful            Sign Out: Acceptable/Baseline neuro status   Airway/Respiratory: Uneventful            Sign Out: Acceptable/Baseline resp. status   CV/Hemodynamics: Uneventful            Sign Out: Acceptable CV status   Other NRE: NONE   DID A NON-ROUTINE EVENT OCCUR? No    Event details/Postop Comments:  Pt was happy with anesthesia care.  No complications.  I will follow up with the pt if needed.           Last vitals:  Vitals Value Taken Time   BP 99/64 07/28/22 1225   Temp 99.32  F (37.4  C) 07/28/22 1229   Pulse 72 07/28/22 1229   Resp 7 07/28/22 1229   SpO2 98 % 07/28/22 1229   Vitals shown include unvalidated device data.    Electronically Signed By: FABIOLA Urbano CRNA  July 28, 2022  12:30 PM

## 2022-07-28 NOTE — OP NOTE
Procedure Date: 07/28/2022    PREOPERATIVE DIAGNOSES:  1.  Chronic dysmenorrhea.  2.  Chronic menorrhagia.    POSTOPERATIVE DIAGNOSES:    1.  Chronic dysmenorrhea.  2.  Chronic menorrhagia.  3.  The patient was ultrasounded in June of this year and told that she has normal ovaries bilaterally by a laparoscopic examination.  There is a surgical excision of her right ovary and right fallopian tube, leaving her with just 1 left ovary and left fallopian tube.  This was expressed to her  as to the contradiction there and a brief review of Care Everywhere was not able to find a surgical pathology at this point.  It was not removed through the [employer].    PROCEDURES:  1.  Laparoscopy.  2.  Left salpingectomy.  3.  Subtotal hysterectomy.  4.  Left ovarian cyst osteotomy done via cystotomy.    SURGEON:  Axel Leon M.D.    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    ANESTHESIA:  General.    COMPLICATIONS:  None.    DRAINS AND PACKS:  None.    ESTIMATED BLOOD LOSS:  30 mL    URINE:  50 mL.    PATH SPECIMENS:  Uterus and fundus and left fallopian tube.    INDICATION:  Ms. Brachtl is a 31-year-old who has had multiple children, but then also had a miscarriage this past spring.  She was previously planning to have a hysterectomy prior to getting pregnant and then once the pregnancy ended in a miscarriage, she went back to the similar complaint that she has had in the past.    FINDINGS:  Again, right ovary and fallopian tube are surgically absent at the time of this procedure.  Left ovary has multiple numbers of cysts and otherwise normal ovary and then also a normal fallopian tube.    DESCRIPTION OF PROCEDURE:  Operative procedure as follows:  After adequate general anesthesia, the patient was prepped and draped in the usual sterile fashion,  placed in the dorsal lithotomy position.  Speculum was placed in vagina.  Anterior lip grasped with a single tooth tenaculum and a Hulka cannula articulated to that.  The bladder was drained with a Fulton and the patient was placed in Trendelenburg.    An incision was made below the umbilicus approximately 3 cm in length and extended down to the anterior peritoneum.  A GelPort was then placed and the trocar was placed through the GelPort prior to connection of the GelPort to its skin retracting ring.  A trocar for the camera was then placed through that under direct visualization.  A right-sided port was placed 8 mm in size and then a left port was placed.  Left accessory port was placed.  That was 5 mm.  The da Yani was docked.    The pelvis was explored with findings as noted above.  Liver was also evaluated and appearing normal.  The left fallopian tube was grasped and dissected off of its ovarian pedicle to the level of the uterus and then cauterized and cut and removed.  There are multiple left ovarian cysts, one larger approximately 5 cm that was opened and drained of its contents.  Cyst wall was thin and contents were clear fluid.  Dissection then extended through the uteroovarian ligament on the left side as well as the round ligament.  The uterine artery was skeletonized, cauterized and cut and the bladder flap developed.  Eventually, the uterus was partially amputated from the cervix on the left side and attention turned to the right side.  The right side ovary was absent and so dissection through the connective tissue prior to the round ligament was done, cauterized with cautery.  The round ligament was cauterized and cut and dissection down the right side to the level of the uterine artery.  Uterine artery skeletonized, cauterized and cut and the rest of the bladder flap developed.  The uterus was then fully amputated from the cervix.  The uterus was placed off to the side.    The cervical canal was  cauterized and 2 stitches of 0 Vicryl were placed over the cervical stump and good hemostasis was also had with light electrocautery.  The anterior peritoneum was reapproximated with the posterior peritoneum.  Good hemostasis was had and da Yani was undocked.    Endobag was placed in the abdomen and the uterus was placed into it and then removed and the uterus was morcellated intact.  The bag was also intact.    The GelPort was removed and the fascia grasped and closed with 0 Vicryl.  It was noted on further inspection laparoscopically that there was a loop of omentum adherent.  The stitch was then removed and the omentum continued to be adherent, indicating she was residual from a previous laparoscopic surgery that she had with the same incision that we used.  Carefully, the fascia was closed again, and again the incision was observed from the inferior aspect of the abdomen through the laparoscope with no involvement in the bowel.  Pelvis was once again explored, noted to be hemostatic and the pneumoperitoneum evacuated and the patient recalled from general anesthesia after a final vaginal check and the skin edges reapproximated with 4-0 Vicryl.    ESTIMATED BLOOD LOSS:  30 mL.    Sponge and needle count correct at the conclusion of the case.  The patient tolerated the procedure well and was transferred to recovery room in satisfactory condition and will be discharged home, awake and alert, p.o. and voiding.    DISCHARGE MEDICATIONS:  Include ibuprofen and Norco.    Axel Leon MD        D: 2022   T: 2022   MT: md    Name:     BRACHTL, BRITTAINY N.  MRN:      [id number]        Account:        [id number]   :      1990           Procedure Date: 2022     Document: [id number]

## 2022-07-28 NOTE — ANESTHESIA PROCEDURE NOTES
Airway       Patient location during procedure: OR       Procedure Start/Stop Times: 7/28/2022 9:08 AM  Staff -        CRNA: Andrea Lyons APRN CRNA       Performed By: CRNA  Consent for Airway        Urgency: elective  Indications and Patient Condition       Indications for airway management: cameron-procedural       Induction type:intravenous       Mask difficulty assessment: 1 - vent by mask    Final Airway Details       Final airway type: endotracheal airway       Successful airway: ETT - single  Endotracheal Airway Details        ETT size (mm): 6.5       Cuffed: yes       Cuff volume (mL): 8       Successful intubation technique: direct laryngoscopy       DL Blade Type: MAC 3       Grade View of Cords: 1       Adjucts: stylet       Position: Right       Measured from: lips       Secured at (cm): 21       Bite block used: None    Post intubation assessment        Placement verified by: capnometry, equal breath sounds and chest rise        Number of attempts at approach: 1       Number of other approaches attempted: 0       Secured with: plastic tape       Ease of procedure: easy       Dentition: Intact and Unchanged    Medication(s) Administered   Medication Administration Time: 7/28/2022 9:08 AM

## 2022-07-28 NOTE — ANESTHESIA CARE TRANSFER NOTE
Patient: Brittainy N Brachtl    Procedure: Procedure(s):  HYSTERECTOMY, TOTAL, ROBOT-ASSISTED subtotal Bilateral salpingectomy       Diagnosis: Menorrhagia with irregular cycle [N92.1]  Pelvic pain in female [R10.2]  Irregular menstrual cycle [N92.6]  Diagnosis Additional Information: No value filed.    Anesthesia Type:   General     Note:    Oropharynx: spontaneously breathing and oropharynx clear of all foreign objects  Level of Consciousness: awake  Oxygen Supplementation: face mask  Level of Supplemental Oxygen (L/min / FiO2): 10 L/M  Independent Airway: airway patency satisfactory and stable  Dentition: dentition unchanged  Vital Signs Stable: post-procedure vital signs reviewed and stable  Report to RN Given: handoff report given  Patient transferred to: PACU    Handoff Report: Identifed the Patient, Identified the Reponsible Provider, Reviewed the pertinent medical history, Discussed the surgical course, Reviewed Intra-OP anesthesia mangement and issues during anesthesia, Set expectations for post-procedure period and Allowed opportunity for questions and acknowledgement of understanding      Vitals:  Vitals Value Taken Time   /86 07/28/22 1135   Temp 98.96  F (37.2  C) 07/28/22 1139   Pulse 92 07/28/22 1139   Resp 9 07/28/22 1139   SpO2 100 % 07/28/22 1139   Vitals shown include unvalidated device data.    Electronically Signed By: FABIOLA Urbano CRNA  July 28, 2022  11:40 AM

## 2022-08-01 ENCOUNTER — TELEPHONE (OUTPATIENT)
Dept: OBGYN | Facility: OTHER | Age: 32
End: 2022-08-01

## 2022-08-01 PROCEDURE — 88307 TISSUE EXAM BY PATHOLOGIST: CPT | Mod: 26 | Performed by: PATHOLOGY

## 2022-08-01 NOTE — TELEPHONE ENCOUNTER
"Axel Leon MD Netland, Heidi, RN  Caller: Unspecified (Today,  2:57 PM)  \"Spoke with pathologist.   She was noting some cervical tissue even though it is a subtotal/supracervical hyst.   That is normal in that some cervix just below the fundus may be removed with any hyst\"  "

## 2022-08-01 NOTE — TELEPHONE ENCOUNTER
Received call from Dr. Smith, pathologist.  She is wanting to talk to Dr. Leon regarding a recent surgery he preformed on pt.  Pt had a PROCEDURES:  1.  Laparoscopy.  2.  Left salpingectomy.  3.  Subtotal hysterectomy.  4.  Left ovarian cyst osteotomy done via cystotomy.  On 7/28.  Pathologist is concerned because she believes the orders/procedure were for a supracervical hysterectomy but yet she received a cervix along with the uterus and left fallopian tube.  She would like to clarify with Dr. Leon since she was not expecting to see a cervix due to orders.    I did reach out via Teams to Dr. Leon's MA today with no response.  I will send an urgent message to Dr. Leon to call Dr. Smith back on her cell phone below.  Cell phone: [phone]    Jenna Victor RN

## 2022-08-02 RX ORDER — HYDROCODONE BITARTRATE AND ACETAMINOPHEN 5; 325 MG/1; MG/1
1 TABLET ORAL EVERY 6 HOURS PRN
Qty: 15 TABLET | Refills: 0 | Status: SHIPPED | OUTPATIENT
Start: 2022-08-02 | End: 2022-08-06

## 2022-08-04 DIAGNOSIS — G89.18 POST-OP PAIN: ICD-10-CM

## 2022-08-05 RX ORDER — IBUPROFEN 800 MG/1
800 TABLET, FILM COATED ORAL EVERY 6 HOURS PRN
Qty: 30 TABLET | Refills: 0 | Status: SHIPPED | OUTPATIENT
Start: 2022-08-05 | End: 2022-08-20

## 2022-08-15 ENCOUNTER — OFFICE VISIT (OUTPATIENT)
Dept: OBGYN | Facility: CLINIC | Age: 32
End: 2022-08-15
Payer: COMMERCIAL

## 2022-08-15 VITALS
SYSTOLIC BLOOD PRESSURE: 129 MMHG | WEIGHT: 143.1 LBS | HEART RATE: 121 BPM | BODY MASS INDEX: 23.81 KG/M2 | OXYGEN SATURATION: 98 % | DIASTOLIC BLOOD PRESSURE: 94 MMHG

## 2022-08-15 DIAGNOSIS — Z98.890 POSTOPERATIVE STATE: Primary | ICD-10-CM

## 2022-08-15 PROCEDURE — 99024 POSTOP FOLLOW-UP VISIT: CPT | Performed by: OBSTETRICS & GYNECOLOGY

## 2022-08-15 NOTE — PROGRESS NOTES
SUBJECTIVE:      Omar is seen for post op.                                                Brittainy N Brachtl is a 31 year old female who presents to clinic today for the following health issue(s):  Patient presents with:  Post-op Visit    She has had a slow recovery to the point where she is not having excessive pain.  We talked about should she know she is having surgery in the future that she be evaluated for metabolic inability to metabolize narcotics.  Recovery in the post op rooms was exceeding ly difficult.    She is doing much better now. Still fatigued by end of day but much more normal.    We reviewed her path and photos. She is missing her right ovary and tube.  She has no awareness of how that could have been surgically removed.  Photos show what appears to be surgical removal as opposed to embryonic anomaly.      Incisions healing well.      A/P  Post op 2 weeks.  Improving well.  May increase activity  Still having spotting  Suggest if in a month she still is that she be seen at that point.  Continue with pelvic rest restrictions      Axel Leon MD  [facility]

## 2022-08-23 ENCOUNTER — TELEPHONE (OUTPATIENT)
Dept: OBGYN | Facility: OTHER | Age: 32
End: 2022-08-23

## 2022-08-23 NOTE — PATIENT INSTRUCTIONS
If you have any questions regarding your visit, Please contact your care team.     [employer] [facility]: [phone]    To Schedule an Appointment 24/7  Call: [phone]     MD Rox Sparks - Certified Medical Assistant    Rossy Collins-Surgery Scheduler  Elizabeth-Surgery Scheduler     Monday-[place]  1:00 pm-4:30 pm    Tuesday-[place]  7:30 am-4:30 pm    Wednesday-[place]  7:30 am-4:30 pm        Typical Surgery Days: Monday or Thursday       [address] [address]  [phone] appointment line  [phone] Fax    Imaging Scheduling all locations  [phone]    **Surgeries** Our Surgery Schedulers will contact you to schedule. If you do not receive a call within 3 business days, please call [phone].    If you need a medication refill, please contact your pharmacy. Please allow 3 business days for your refill to be completed.    As always, Thank you for trusting us with your healthcare needs!                   see additional instructions from your care team below    
Anemia    Breast cancer  Left breast cancer - 13 years ago, s/p mastectomy, and chemoptherapy  Emphysema lung    Other specified disorders of kidney and ureter

## 2022-08-23 NOTE — TELEPHONE ENCOUNTER
Reason for Call:  Other appointment    Detailed comments: Patient stated she talked to Dr. Axel Leon on the phone and he said he wanted her to be seen within the next week if she did not stop bleeding and she has not. There are not any available dates until October and she needs to be seen sooner than that in [place] like Dr. Leon wanted her to.     Phone Number Patient can be reached at: Home number on file [phone] (home)    Best Time: any    Can we leave a detailed message on this number? YES    Call taken on 8/23/2022 at 12:55 PM by Zoila Cole

## 2022-08-23 NOTE — PROGRESS NOTES
SUBJECTIVE:     I spent a total                                                Brittainy N Brachtl is a 31 year old female who presents to clinic today for the following health issue(s):  No chief complaint on file.    Pt seen for follow up of post op bleeding    Notes no bleeding as of one day ago    Exam;  Normal ext gen  Vag mucosa without lesion  Cx intact  Yellowish discharge, no blood.    A/P  Normal post op cx  Suggest cleocin vag cream to potentially improve healing  Follow up PRN      Axel Leon MD  [facility]

## 2022-08-29 ENCOUNTER — OFFICE VISIT (OUTPATIENT)
Dept: OBGYN | Facility: CLINIC | Age: 32
End: 2022-08-29
Payer: COMMERCIAL

## 2022-08-29 VITALS — BODY MASS INDEX: 24.3 KG/M2 | DIASTOLIC BLOOD PRESSURE: 82 MMHG | SYSTOLIC BLOOD PRESSURE: 126 MMHG | WEIGHT: 146 LBS

## 2022-08-29 DIAGNOSIS — N89.8 VAGINAL DISCHARGE: Primary | ICD-10-CM

## 2022-08-29 PROCEDURE — 99024 POSTOP FOLLOW-UP VISIT: CPT | Performed by: OBSTETRICS & GYNECOLOGY

## 2022-08-29 RX ORDER — CLINDAMYCIN PHOSPHATE 20 MG/G
1 CREAM VAGINAL AT BEDTIME
Qty: 40 G | Refills: 0 | Status: SHIPPED | OUTPATIENT
Start: 2022-08-29

## 2022-09-18 ENCOUNTER — HEALTH MAINTENANCE LETTER (OUTPATIENT)
Age: 32
End: 2022-09-18

## 2022-09-27 NOTE — TELEPHONE ENCOUNTER
She has been seen a few times since this message came in, closing old encounter.   Rosario Casillas MD

## 2022-11-28 NOTE — PROGRESS NOTES
Omar is a 32 year old who is being evaluated via a billable telephone visit.      What phone number would you like to be contacted at? [phone]    How would you like to obtain your AVS? Cara  Provider location- Onsite    I spent a total of 25 minutes on the care of Omar on the day of service including 20 minutes and phone conversation with the remainder of time in chart review, care coordination, documentation on the day of service.    Azalia is a 32-year-old P4 034 who had a hysterectomy approximately 6 months ago for menorrhagia.  She presents with a number of complaints today.    She notes that she has been irritable and she describes as like when she is had Depo shots in the past.  Last time she had a Depo shot was 11 years ago.  She notes that the irritability comes on prior to her.  It affects her social and family life.  We talked about being evaluated for PMS and at this point there are a number of other issues that were more concerning to her and suggest that possibly she would do well on an antidepressant but suggest that he be evaluated at another time.    She also complains of feeling sore with each sexual experience.  She notes that feels like there is friction on the outside and that inside of the vagina swells up as result of being sexually active.  She complains that she normally feels that over whelming sense of vaginal dryness and this is been going on prior to her hysterectomy.  However now she also feels like she has a yeast infection but has taken both fluconazole and Monistat and neither seem to make a difference.  She can still tell that she is cycling in spite of not having a uterus.  She states that for the last 3 cycles she has had this sense of having a yeast infection.  Also noting that she lacks lubrication with being sexually active and that is a new change for her.  We talked about possibly starting on an estrogen cream but first being evaluated for premature ovarian  failure and told her an FSH to be drawn but she is agreeable to having that taken care of.  We also stopped talked about an Estrace cream but finding out whether that is cost effective for her by looking at either the back of her insurance card or talking to her pharmacist.    She complains of random ovarian pain and in the same sentence says that her urine smells foul or at least has a stronger smell to it.  She does not have any other symptoms of a bladder infection in terms of frequency nocturia dysuria back pain.  But she does state that she is incontinent.    In discussing her incontinence she says she leaks all the time.  She is leaking when she finishes her urine stream toilet and has to wipe that she standing up because she continues to dribble.  She also notes that she has leaked when not having to go to the bathroom.  This has become a daily activity in terms of leaking and at this point is more than just a nuisance for her.  We talked about having a consult with urogynecology and she is given the phone number for [facility] in [place] to be evaluated or at least have a conversation over the phone with.    Assessment multiple GYN and social psychological issues.  She has had 4 vaginal births and it would not be unexpected for her to have some leakage at this time.  She may be a good candidate for physical therapy for that.  She also needs to be evaluated for her discharge and vaginal dryness and suggest that she check back after the holidays but in the meantime we will check an FSH and as well as UA.    Plan:  As above those labs will be ordered and then also referral is put in for urogynecology as well as an effort to see if vaginal discharge and dryness and her postcoital pain is improved by application of a estrogen containing cream.  Suggest that she follow-up with at least a phone visit after the holidays but also best probably to be seen at that point

## 2022-11-29 ENCOUNTER — VIRTUAL VISIT (OUTPATIENT)
Dept: OBGYN | Facility: OTHER | Age: 32
End: 2022-11-29
Payer: COMMERCIAL

## 2022-11-29 DIAGNOSIS — R82.90 FOUL SMELLING URINE: ICD-10-CM

## 2022-11-29 DIAGNOSIS — N94.3 PMS (PREMENSTRUAL SYNDROME): ICD-10-CM

## 2022-11-29 DIAGNOSIS — N89.8 VAGINAL DRYNESS: Primary | ICD-10-CM

## 2022-11-29 DIAGNOSIS — N39.3 STRESS INCONTINENCE OF URINE: ICD-10-CM

## 2022-11-29 PROCEDURE — 99213 OFFICE O/P EST LOW 20 MIN: CPT | Mod: 95 | Performed by: OBSTETRICS & GYNECOLOGY

## 2022-11-29 RX ORDER — ESTRADIOL 0.1 MG/G
2 CREAM VAGINAL
Qty: 42.5 G | Refills: 1 | Status: SHIPPED | OUTPATIENT
Start: 2022-12-01

## 2022-12-01 ENCOUNTER — MYC MEDICAL ADVICE (OUTPATIENT)
Dept: OBGYN | Facility: OTHER | Age: 32
End: 2022-12-01

## 2022-12-02 ENCOUNTER — LAB (OUTPATIENT)
Dept: LAB | Facility: CLINIC | Age: 32
End: 2022-12-02
Payer: COMMERCIAL

## 2022-12-02 DIAGNOSIS — Z11.59 NEED FOR HEPATITIS C SCREENING TEST: Primary | ICD-10-CM

## 2022-12-02 DIAGNOSIS — N89.8 VAGINAL DRYNESS: ICD-10-CM

## 2022-12-02 DIAGNOSIS — R82.90 FOUL SMELLING URINE: ICD-10-CM

## 2022-12-02 DIAGNOSIS — O26.859 SPOTTING IN PREGNANCY: ICD-10-CM

## 2022-12-02 LAB
ALBUMIN UR-MCNC: NEGATIVE MG/DL
APPEARANCE UR: CLEAR
B-HCG SERPL-ACNC: <1 IU/L (ref 0–5)
BACTERIA #/AREA URNS HPF: ABNORMAL /HPF
BILIRUB UR QL STRIP: NEGATIVE
COLOR UR AUTO: YELLOW
FSH SERPL IRP2-ACNC: 8.7 MIU/ML
GLUCOSE UR STRIP-MCNC: NEGATIVE MG/DL
HGB UR QL STRIP: NEGATIVE
KETONES UR STRIP-MCNC: NEGATIVE MG/DL
LEUKOCYTE ESTERASE UR QL STRIP: NEGATIVE
NITRATE UR QL: NEGATIVE
PH UR STRIP: 6 [PH] (ref 5–7)
RBC URINE: 0 /HPF
SP GR UR STRIP: 1.01 (ref 1–1.03)
SQUAMOUS EPITHELIAL: 3 /HPF
UROBILINOGEN UR STRIP-MCNC: NORMAL MG/DL
WBC URINE: <1 /HPF

## 2022-12-02 PROCEDURE — 81001 URINALYSIS AUTO W/SCOPE: CPT

## 2022-12-02 PROCEDURE — 84702 CHORIONIC GONADOTROPIN TEST: CPT

## 2022-12-02 PROCEDURE — 83001 ASSAY OF GONADOTROPIN (FSH): CPT

## 2022-12-02 PROCEDURE — 36415 COLL VENOUS BLD VENIPUNCTURE: CPT

## 2022-12-28 NOTE — PROGRESS NOTES
"    SUBJECTIVE:     I spent a total of 25 minutes in the care of Azalia on the day of service including 15 minutes face-to-face time with remainder in chart review, care coordination, documentation on the day of service.                                                  Brittainy N Brachtl is a 32 year old female who presents to clinic today for the following health issue(s):  Patient presents with:  Follow Up    Azalia is a 32-year-old who comes complaining of vaginal dryness.  She states that the entire family had influenza A with couple weeks ago and in spite that the vaginal dryness is a little bit better.  She notes that she is taking Monistat and Diflucan 2 to 3 months ago without benefit but then tried coconut oil and found some improvement.    In the interim an FSH was done which demonstrates a normal level at 8.  She is status post laparoscopic subtotal hysterectomy approximately 9 months ago.    She states that when she has the itching and burning she has everything going on including pelvic pain.  She notes that it is \"really really bad\" and she feels \"super dry \".  States that this sensation typically comes 1 week before with her.  Normally would come.    We discussed vaginal dryness and various remedies for that which include probiotics as well as hydrating over-the-counter medications for acidifying the vaginal discharge with beverages such as cranberry juice.  We discussed also the use of application of yogurt to reestablish lactobacilli and briefly discussed the functional lactobacilli in the natural environment.    She also discusses the fact that she is feeling depressed and its been years since she was on Wellbutrin and asking to start that again.  She notes that she was on 200 mg at the time.  I suggest that give her the first 3 months medication and then she needs to follow-up with her primary care which is Dr. Casillas.  Additionally she notes that her significant other and her have been " argumentative quite a bit lately she states he has posttraumatic stress and they have discussed possible separation.  I suggest also that they seek out some counseling but again approach that with her primary care provider sooner rather than later.    Examination:  Patient is pleasant appropriate no apparent distress  External genitalia are normal no signs of lesions  Vaginal mucosa has normal rugae as well as normal amounts of discharge  Discharge looks normal whitish no signs of infection or inflammation  Cervix is intact and no bleeding.    Assessment:  Patient with a history of difficult recovery from a hysterectomy now with vaginal dryness.  Suggest over-the-counter remedies as noted above.  She also is concerned about depression affecting her ability to interact with her significant other    Plan:  Prescription is sent for Wellbutrin 200 mg twice daily  Suggest that she seek out counseling or marriage therapy.  Suggest also that she follow-up with her primary care for continuation of medication and evaluation or further recommendations      Patient's last menstrual period was 08/07/2022 (exact date)..         Today's PHQ-2 Score:   PHQ-2 ( 1999 [employer]) 3/19/2022   Q1: Little interest or pleasure in doing things 0   Q2: Feeling down, depressed or hopeless 0   PHQ-2 Score 0   PHQ-2 Total Score (12-17 Years)- Positive if 3 or more points; Administer PHQ-A if positive -   Q1: Little interest or pleasure in doing things Not at all   Q2: Feeling down, depressed or hopeless Not at all   PHQ-2 Score 0     Today's PHQ-9 Score:   PHQ-9 SCORE 12/5/2022   PHQ-9 Total Score MyChart 13 (Moderate depression)   PHQ-9 Total Score 13     Today's RADHA-7 Score: No flowsheet data found.    Problem list and histories reviewed & adjusted, as indicated.  Additional history: as documented.    Patient Active Problem List   Diagnosis     Migraine without aura and without status migrainosus, not intractable     Chronic bilateral low back  pain with bilateral sciatica     Greater trochanteric bursitis of both hips     Chronic hip pain, bilateral     Menorrhagia with irregular cycle     Past Surgical History:   Procedure Laterality Date     CHOLECYSTECTOMY  07/2009     DAVINCI HYSTERECTOMY TOTAL, SALPINGECTOMY BILATERAL Bilateral 7/28/2022    Procedure: HYSTERECTOMY, TOTAL, ROBOT-ASSISTED subtotal Bilateral salpingectomy;  Surgeon: Axel Leon MD;  Location: [facility]     DILATION AND CURETTAGE SUCTION N/A 5/26/2022    Procedure: DILATION AND CURETTAGE, UTERUS, USING SUCTION;  Surgeon: Axel Leon MD;  Location: [facility] OR      Social History     Tobacco Use     Smoking status: Light Smoker     Packs/day: 0.50     Years: 10.00     Pack years: 5.00     Types: Cigarettes     Smokeless tobacco: Never     Tobacco comments:     1-3 daily   Substance Use Topics     Alcohol use: Not Currently     Comment: very rarely      Problem (# of Occurrences) Relation (Name,Age of Onset)    Cancer (1) Mother (Catherine)    Cerebrovascular Disease (1) Maternal Grandfather    Back Pain (1) Mother (Catherine)    Bronchitis (1) Maternal Grandmother    Emphysema (1) Maternal Grandmother    No Known Problems (7) Half-Brother (Bienvenido), Half-Brother (Chris), Paternal Grandmother, Paternal Grandfather, Daughter (Maria Del Carmen), Daughter (Brittani), Daughter (Georgina)            Current Outpatient Medications   Medication Sig     clindamycin (CLEOCIN) 2 % vaginal cream Place 1 applicator vaginally At Bedtime (Patient not taking: Reported on 11/29/2022)     estradiol (ESTRACE) 0.1 MG/GM vaginal cream Place 2 g vaginally twice a week     HYDROmorphone (DILAUDID) 2 MG tablet Take 1-2 tablets (2-4 mg) by mouth every 6 hours as needed for pain (Patient not taking: Reported on 11/29/2022)     No current facility-administered medications for this visit.           OBJECTIVE:     LMP 08/07/2022 (Exact Date)   There is no height or weight on file to calculate BMI.    Exam:  As above     In-Clinic  Test Results:  No results found for this or any previous visit (from the past 24 hour(s)).    ASSESSMENT/PLAN:                                                      See above    Axel Leon MD  [facility]

## 2023-01-02 ENCOUNTER — OFFICE VISIT (OUTPATIENT)
Dept: OBGYN | Facility: CLINIC | Age: 33
End: 2023-01-02
Payer: COMMERCIAL

## 2023-01-02 VITALS
SYSTOLIC BLOOD PRESSURE: 126 MMHG | HEART RATE: 109 BPM | OXYGEN SATURATION: 98 % | BODY MASS INDEX: 23.9 KG/M2 | DIASTOLIC BLOOD PRESSURE: 80 MMHG | WEIGHT: 143.6 LBS

## 2023-01-02 DIAGNOSIS — N89.8 VAGINAL DRYNESS: Primary | ICD-10-CM

## 2023-01-02 DIAGNOSIS — Z11.59 NEED FOR HEPATITIS C SCREENING TEST: ICD-10-CM

## 2023-01-02 DIAGNOSIS — F32.89 OTHER DEPRESSION: ICD-10-CM

## 2023-01-02 DIAGNOSIS — L29.9 ITCHING: ICD-10-CM

## 2023-01-02 LAB
CLUE CELLS: ABNORMAL
TRICHOMONAS, WET PREP: ABNORMAL
WBC'S/HIGH POWER FIELD, WET PREP: ABNORMAL
YEAST, WET PREP: ABNORMAL

## 2023-01-02 PROCEDURE — 99213 OFFICE O/P EST LOW 20 MIN: CPT | Performed by: OBSTETRICS & GYNECOLOGY

## 2023-01-02 PROCEDURE — 87210 SMEAR WET MOUNT SALINE/INK: CPT | Performed by: OBSTETRICS & GYNECOLOGY

## 2023-01-02 RX ORDER — BUPROPION HYDROCHLORIDE 200 MG/1
200 TABLET, EXTENDED RELEASE ORAL 2 TIMES DAILY
Qty: 90 TABLET | Refills: 1 | Status: SHIPPED | OUTPATIENT
Start: 2023-01-02

## 2023-01-02 NOTE — PATIENT INSTRUCTIONS
If you have any questions regarding your visit, Please contact your care team.     [employer] [facility]: [phone]    To Schedule an Appointment 24/7  Call: [phone]LE[facility]     Axel Leon MD    Medical Assistant    Rossy Rodriguez-Surgery Scheduler  Elizabeth-Surgery Scheduler     Monday-[place]  1:00 pm-4:30 pm    Tuesday-[place]  7:30 am-4:30 pm    Wednesday-[place]  7:30 am-4:30 pm        Typical Surgery Days: Monday or Thursday       [address] [address]  [phone] appointment line  [phone] Fax    Imaging Scheduling all locations  [phone]    **Surgeries** Our Surgery Schedulers will contact you to schedule. If you do not receive a call within 3 business days, please call [phone].    If you need a medication refill, please contact your pharmacy. Please allow 3 business days for your refill to be completed.    As always, Thank you for trusting us with your healthcare needs!                   see additional instructions from your care team below

## 2023-01-29 ENCOUNTER — HEALTH MAINTENANCE LETTER (OUTPATIENT)
Age: 33
End: 2023-01-29

## 2024-02-25 ENCOUNTER — HEALTH MAINTENANCE LETTER (OUTPATIENT)
Age: 34
End: 2024-02-25

## 2025-01-18 ENCOUNTER — HOSPITAL ENCOUNTER (EMERGENCY)
Facility: CLINIC | Age: 35
Discharge: HOME | End: 2025-01-18
Attending: EMERGENCY MEDICINE | Admitting: EMERGENCY MEDICINE
Payer: COMMERCIAL

## 2025-01-18 VITALS
HEIGHT: 65 IN | OXYGEN SATURATION: 99 % | WEIGHT: 139 LBS | SYSTOLIC BLOOD PRESSURE: 124 MMHG | DIASTOLIC BLOOD PRESSURE: 81 MMHG | TEMPERATURE: 98.1 F | RESPIRATION RATE: 18 BRPM | HEART RATE: 91 BPM | BODY MASS INDEX: 23.16 KG/M2

## 2025-01-18 DIAGNOSIS — J11.1 INFLUENZA-LIKE ILLNESS: ICD-10-CM

## 2025-01-18 LAB
FLUAV RNA SPEC QL NAA+PROBE: NEGATIVE
FLUBV RNA RESP QL NAA+PROBE: NEGATIVE
RSV RNA SPEC NAA+PROBE: NEGATIVE
SARS-COV-2 RNA RESP QL NAA+PROBE: NEGATIVE

## 2025-01-18 PROCEDURE — 99283 EMERGENCY DEPT VISIT LOW MDM: CPT

## 2025-01-18 PROCEDURE — 87637 SARSCOV2&INF A&B&RSV AMP PRB: CPT | Performed by: FAMILY MEDICINE

## 2025-01-18 PROCEDURE — 99283 EMERGENCY DEPT VISIT LOW MDM: CPT | Performed by: EMERGENCY MEDICINE

## 2025-01-18 PROCEDURE — 87637 SARSCOV2&INF A&B&RSV AMP PRB: CPT | Performed by: EMERGENCY MEDICINE

## 2025-01-18 ASSESSMENT — ACTIVITIES OF DAILY LIVING (ADL)
ADLS_ACUITY_SCORE: 42
ADLS_ACUITY_SCORE: 42

## 2025-01-18 ASSESSMENT — COLUMBIA-SUICIDE SEVERITY RATING SCALE - C-SSRS
1. IN THE PAST MONTH, HAVE YOU WISHED YOU WERE DEAD OR WISHED YOU COULD GO TO SLEEP AND NOT WAKE UP?: NO
6. HAVE YOU EVER DONE ANYTHING, STARTED TO DO ANYTHING, OR PREPARED TO DO ANYTHING TO END YOUR LIFE?: NO
2. HAVE YOU ACTUALLY HAD ANY THOUGHTS OF KILLING YOURSELF IN THE PAST MONTH?: NO

## 2025-01-18 NOTE — ED TRIAGE NOTES
Pt presents with concerns of a cough, diarrhea, nausea, and body aches.   Pt states that she has had these symptoms for the past two days.  Pt has been exposed to Influenza A.       Triage Assessment (Adult)       Row Name 01/18/25 0769          Triage Assessment    Airway WDL WDL        Respiratory WDL    Respiratory WDL WDL        Skin Circulation/Temperature WDL    Skin Circulation/Temperature WDL WDL        Cardiac WDL    Cardiac WDL WDL        Peripheral/Neurovascular WDL    Peripheral Neurovascular WDL WDL        Cognitive/Neuro/Behavioral WDL    Cognitive/Neuro/Behavioral WDL WDL

## 2025-01-18 NOTE — LETTER
January 18, 2025      To Whom It May Concern:      Brittainy N Brachtl was seen in our Emergency Department today, 01/18/25.  I expect her condition to improve over the next 7 days.  She may return to work when improved.    Sincerely,        Flaco Olsen MD  Electronically signed

## 2025-01-18 NOTE — ED PROVIDER NOTES
History     Chief Complaint   Patient presents with    Flu Symptoms     HPI  Brittainy N Brachtl is a 34 year old female who with past medical history significant for asthma, migraines who presents to the emergency department secondary to influenza symptoms including cough diarrhea nausea and bodyaches.  Symptoms started 2 days ago.  She was exposed to influenza A.  Her significant other is here with a positive influenza A test.  She has had some diarrhea and vomiting as well.  She does not feel dehydrated.  She feels like she drink plenty of water.  She is not having wheezing or significant shortness of breath.  Her children have influenza A currently.  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    Allergies:  No Known Allergies    Problem List:    Patient Active Problem List    Diagnosis Date Noted    Menorrhagia with irregular cycle 07/26/2022     Priority: Medium    Chronic hip pain, bilateral 12/30/2020     Priority: Medium    Chronic bilateral low back pain with bilateral sciatica 12/02/2020     Priority: Medium    Greater trochanteric bursitis of both hips 12/02/2020     Priority: Medium    Migraine without aura and without status migrainosus, not intractable 07/09/2020     Priority: Medium        Past Medical History:    Past Medical History:   Diagnosis Date    Asthma     Cystic fibrosis carrier     Migraines        Past Surgical History:    Past Surgical History:   Procedure Laterality Date    CHOLECYSTECTOMY  07/2009    DAVINCI HYSTERECTOMY TOTAL, SALPINGECTOMY BILATERAL Bilateral 7/28/2022    Procedure: HYSTERECTOMY, TOTAL, ROBOT-ASSISTED subtotal Bilateral salpingectomy;  Surgeon: Axel Leon MD;  [facility]    DILATION AND CURETTAGE SUCTION N/A 5/26/2022    Procedure: DILATION AND CURETTAGE, UTERUS, USING SUCTION;  Surgeon: Axel Leon MD;  Location: [facility] OR       Family History:    Family History   Problem Relation Age of Onset    Cancer Mother     Back Pain Mother     No  "Known Problems Half-Brother     No Known Problems Half-Brother     Emphysema Maternal Grandmother     Bronchitis Maternal Grandmother     Cerebrovascular Disease Maternal Grandfather     No Known Problems Paternal Grandmother     No Known Problems Paternal Grandfather     No Known Problems Daughter     No Known Problems Daughter     No Known Problems Daughter        Social History:  Marital Status:   [2]  Social History     Tobacco Use    Smoking status: Light Smoker     Current packs/day: 0.50     Average packs/day: 0.5 packs/day for 10.0 years (5.0 ttl pk-yrs)     Types: Cigarettes    Smokeless tobacco: Never    Tobacco comments:     1-3 daily   Vaping Use    Vaping status: Never Used   Substance Use Topics    Alcohol use: Not Currently     Comment: very rarely    Drug use: Never        Medications:    buPROPion (WELLBUTRIN SR) 200 MG 12 hr tablet  clindamycin (CLEOCIN) 2 % vaginal cream  estradiol (ESTRACE) 0.1 MG/GM vaginal cream  HYDROmorphone (DILAUDID) 2 MG tablet          Review of Systems   All other systems reviewed and are negative.      Physical Exam   BP: 123/85  Pulse: 94  Temp: 98.1  F (36.7  C)  Resp: 18  Height: 165.1 cm (5' 5\")  Weight: 63 kg (139 lb)  SpO2: 100 %      Physical Exam  Vitals and nursing note reviewed.   Constitutional:       General: She is not in acute distress.     Appearance: Normal appearance. She is well-developed.   HENT:      Head: Normocephalic and atraumatic.      Right Ear: External ear normal.      Left Ear: External ear normal.   Eyes:      General: No scleral icterus.     Conjunctiva/sclera: Conjunctivae normal.   Cardiovascular:      Rate and Rhythm: Normal rate and regular rhythm.   Pulmonary:      Effort: Pulmonary effort is normal. No respiratory distress.   Abdominal:      General: Abdomen is flat.   Musculoskeletal:      Cervical back: Normal range of motion and neck supple.   Skin:     General: Skin is warm and dry.      Findings: No rash.   Neurological: "      General: No focal deficit present.      Mental Status: She is alert and oriented to person, place, and time.   Psychiatric:         Mood and Affect: Mood normal.         ED Course        Procedures             Results for orders placed or performed during the hospital encounter of 01/18/25 (from the past 24 hours)   Influenza A/B, RSV and SARS-CoV2 PCR (COVID-19) Nasopharyngeal    Specimen: Nasopharyngeal; Swab   Result Value Ref Range    Influenza A PCR Negative Negative    Influenza B PCR Negative Negative    RSV PCR Negative Negative    SARS CoV2 PCR Negative Negative    Narrative    Testing was performed using the Xpert Xpress CoV2/Flu/RSV Assay on the Cepheid GeneXpert Instrument. This test should be ordered for the detection of SARS-CoV2, influenza, and RSV viruses in individuals with signs and symptoms of respiratory tract infection. This test is for in vitro diagnostic use under the [place] FDA for laboratories certified under CLIA to perform high or moderate complexity testing. This test has been [place] FDA cleared. A negative result does not rule out the presence of PCR inhibitors in the specimen or target RNA in concentration below the limit of detection for the assay. If only one viral target is positive but coinfection with multiple targets is suspected, the sample should be re-tested with another FDA cleared, approved, or authorized test, if coninfection would change clinical management. This test was validated by the [facility]. These laboratories are certified under the Clinical Laboratory Improvement Amendments of 1988 (CLIA-88) as qualified to perfom high complexity laboratory testing.       Medications - No data to display    Assessments & Plan (with Medical Decision Making)  34-year-old female with history of asthma who presented to the emergency department with flulike symptoms.  Her  who is here with similar symptoms tested positive for influenza A.  Patient has been  exposed influenza A with her children.  Most likely her test result is a false negative.  Symptoms are quite consistent with influenza.  Patient understands and agrees.  We discussed the risk benefits of taking Tamiflu and she declined the Tamiflu.  She does have a risk factor of asthma understands that it may help prevent hospitalization but would like to avoid the side effects that are possible with Tamiflu.  I think that is a reasonable decision.  Patient's vital signs are reassuring.  Heart rate is normal, blood pressure is normal, oxygen saturation are 99%.  Lungs are clear on exam.  She appears in no distress.  She was advised to use over-the-counter medications for comfort.  Patient was discharged home in stable condition.  She was given a work note and advised to avoid contact with others.  Return to ER precautions and follow-up precautions discussed.  All questions answered prior to discharge.     I have reviewed the nursing notes.    I have reviewed the findings, diagnosis, plan and need for follow up with the patient.          Discharge Medication List as of 1/18/2025 11:14 AM          Final diagnoses:   Influenza-like illness       1/18/2025   [facility] EMERGENCY DEPT       Flaco Olsen MD  01/18/25 3238

## 2025-01-18 NOTE — DISCHARGE INSTRUCTIONS
Most likely is a false negative and you have influenza.  Please avoid contact with others.  Return to the emergency department if you develop new or worsening symptoms such as increasing shortness of breath.  I hope you get better quickly.  Is was a pleasure to meet you.

## 2025-03-15 ENCOUNTER — HEALTH MAINTENANCE LETTER (OUTPATIENT)
Age: 35
End: 2025-03-15

## (undated) DEVICE — ENDO ACCESS PLATFORM GELPOINT MINI CNGL3

## (undated) DEVICE — NDL INSUFFLATION 13GA 150MM C2202

## (undated) DEVICE — GOWN IMPERVIOUS BREATHABLE 2XL/XLONG

## (undated) DEVICE — SYSTEM LAPAROVUE VISIBILITY LAPVUE10

## (undated) DEVICE — LUBRICATING JELLY 4.25OZ

## (undated) DEVICE — DAVINCI SI DRAPE ACCESSORY KIT 3-ARM 420290

## (undated) DEVICE — DAVINCI S CANNULA SEAL 8.5-13MM 420206

## (undated) DEVICE — BASIN SET MINOR DISP

## (undated) DEVICE — GOWN XLG DISP 9545

## (undated) DEVICE — Device

## (undated) DEVICE — ESU CORD MONOPOLAR HIGH FREQUENCY 26006M-D/10

## (undated) DEVICE — SOL WATER IRRIG 1000ML BOTTLE 2F7114

## (undated) DEVICE — SU VICRYL 4-0 PS-2 27" UND J426H

## (undated) DEVICE — LABEL MEDICATION SYSTEM  3304

## (undated) DEVICE — LUBRICANT INST ELECTROLUBE EL101

## (undated) DEVICE — SYR 10ML FINGER CONTROL W/O NDL 309695

## (undated) DEVICE — SPONGE RAY-TEC 4X4" 7317

## (undated) DEVICE — GLOVE PROTEXIS BLUE W/NEU-THERA 7.5  2D73EB75

## (undated) DEVICE — PEN MARKING SKIN

## (undated) DEVICE — DRSG TEGADERM 6X8" 1628

## (undated) DEVICE — ENDO POUCH UNIVERSAL RETRIEVAL SYSTEM INZII 12/15MM CD004

## (undated) DEVICE — PACK AB HYST/LITHOTOMY CUSTOM NORTHLAND

## (undated) DEVICE — DAVINCI OBTURATOR 8MM BLADELESS 420023

## (undated) DEVICE — SYR 10ML PREFILLED 0.9% NACL INJ NOT STERILE 306500

## (undated) DEVICE — CATH SELF FEMALE 14FR 6"

## (undated) DEVICE — CATH TRAY FOLEY SURESTEP 16FR W/URNE MTR STLK LATEX A303316A

## (undated) DEVICE — SU VICRYL 0 CT-2 27" J334H

## (undated) DEVICE — ENDO TROCAR FIRST ENTRY KII FIOS Z-THRD 05X100MM CTF03

## (undated) DEVICE — DAVINCI HOT SHEARS TIP COVER  400180

## (undated) DEVICE — TUBING SMOKE EVAC PLUME-AWAY 6' 620-030-606

## (undated) DEVICE — SUCTION STRYKERFLOW II 250-070-500

## (undated) DEVICE — PACK BASIC SET-UP 9101

## (undated) DEVICE — NDL SPINAL 22GA 3.5" QUINCKE 405181

## (undated) DEVICE — GLOVE PROTEXIS BLUE W/NEU-THERA 8.0  2D73EB80

## (undated) DEVICE — SUCTION MANIFOLD NEPTUNE 2 SYS 4 PORT 0702-020-000

## (undated) DEVICE — ESU GROUND PAD UNIVERSAL W/O CORD

## (undated) DEVICE — SU VICRYL 4-0 PS-2 18" UND J496G

## (undated) DEVICE — KIT PATIENT POSITIONING PIGAZZI LATEX FREE 40580

## (undated) DEVICE — SU VICRYL 0 UR-6 27" J603H

## (undated) DEVICE — TUBING IRR SUCTION SET GYRUS

## (undated) DEVICE — SOL NACL 0.9% INJ 1000ML BAG 2B1324X

## (undated) DEVICE — DRAPE POUCH INSTRUMENT 3 POCKET 1018L

## (undated) DEVICE — GLOVE PROTEXIS W/NEU-THERA 7.5  2D73TE75

## (undated) DEVICE — DRAPE GYN/UROLOGY FLUID POUCH TUR 29455

## (undated) DEVICE — PREP SKIN SCRUB TRAY 4461A

## (undated) RX ORDER — DEXMEDETOMIDINE HYDROCHLORIDE 100 UG/ML
INJECTION, SOLUTION INTRAVENOUS
Status: DISPENSED
Start: 2022-07-28

## (undated) RX ORDER — PROPOFOL 10 MG/ML
INJECTION, EMULSION INTRAVENOUS
Status: DISPENSED
Start: 2022-07-28

## (undated) RX ORDER — PROPOFOL 10 MG/ML
INJECTION, EMULSION INTRAVENOUS
Status: DISPENSED
Start: 2022-05-26

## (undated) RX ORDER — KETOROLAC TROMETHAMINE 30 MG/ML
INJECTION, SOLUTION INTRAMUSCULAR; INTRAVENOUS
Status: DISPENSED
Start: 2022-05-26

## (undated) RX ORDER — LIDOCAINE HYDROCHLORIDE 20 MG/ML
INJECTION, SOLUTION EPIDURAL; INFILTRATION; INTRACAUDAL; PERINEURAL
Status: DISPENSED
Start: 2022-07-28

## (undated) RX ORDER — BUPIVACAINE HYDROCHLORIDE AND EPINEPHRINE 2.5; 5 MG/ML; UG/ML
INJECTION, SOLUTION EPIDURAL; INFILTRATION; INTRACAUDAL; PERINEURAL
Status: DISPENSED
Start: 2022-05-26

## (undated) RX ORDER — ONDANSETRON 2 MG/ML
INJECTION INTRAMUSCULAR; INTRAVENOUS
Status: DISPENSED
Start: 2022-05-26

## (undated) RX ORDER — LIDOCAINE HYDROCHLORIDE 10 MG/ML
INJECTION, SOLUTION EPIDURAL; INFILTRATION; INTRACAUDAL; PERINEURAL
Status: DISPENSED
Start: 2022-07-28

## (undated) RX ORDER — DEXAMETHASONE SODIUM PHOSPHATE 10 MG/ML
INJECTION, SOLUTION INTRAMUSCULAR; INTRAVENOUS
Status: DISPENSED
Start: 2022-07-28

## (undated) RX ORDER — FENTANYL CITRATE 50 UG/ML
INJECTION, SOLUTION INTRAMUSCULAR; INTRAVENOUS
Status: DISPENSED
Start: 2022-07-28

## (undated) RX ORDER — BUPIVACAINE HYDROCHLORIDE AND EPINEPHRINE 2.5; 5 MG/ML; UG/ML
INJECTION, SOLUTION EPIDURAL; INFILTRATION; INTRACAUDAL; PERINEURAL
Status: DISPENSED
Start: 2022-07-28

## (undated) RX ORDER — ONDANSETRON 2 MG/ML
INJECTION INTRAMUSCULAR; INTRAVENOUS
Status: DISPENSED
Start: 2022-07-28

## (undated) RX ORDER — FENTANYL CITRATE 50 UG/ML
INJECTION, SOLUTION INTRAMUSCULAR; INTRAVENOUS
Status: DISPENSED
Start: 2022-05-26